# Patient Record
Sex: MALE | Race: WHITE | NOT HISPANIC OR LATINO | Employment: OTHER | URBAN - METROPOLITAN AREA
[De-identification: names, ages, dates, MRNs, and addresses within clinical notes are randomized per-mention and may not be internally consistent; named-entity substitution may affect disease eponyms.]

---

## 2017-07-03 ENCOUNTER — OFFICE VISIT (OUTPATIENT)
Dept: FAMILY MEDICINE | Facility: CLINIC | Age: 57
End: 2017-07-03
Payer: COMMERCIAL

## 2017-07-03 VITALS
HEART RATE: 62 BPM | WEIGHT: 160.69 LBS | SYSTOLIC BLOOD PRESSURE: 102 MMHG | TEMPERATURE: 99 F | HEIGHT: 66 IN | BODY MASS INDEX: 25.83 KG/M2 | DIASTOLIC BLOOD PRESSURE: 68 MMHG

## 2017-07-03 DIAGNOSIS — G47.00 INSOMNIA, UNSPECIFIED TYPE: Primary | ICD-10-CM

## 2017-07-03 DIAGNOSIS — H61.23 BILATERAL IMPACTED CERUMEN: ICD-10-CM

## 2017-07-03 DIAGNOSIS — Z00.00 ROUTINE GENERAL MEDICAL EXAMINATION AT A HEALTH CARE FACILITY: ICD-10-CM

## 2017-07-03 PROCEDURE — 99386 PREV VISIT NEW AGE 40-64: CPT | Mod: S$GLB,,, | Performed by: FAMILY MEDICINE

## 2017-07-03 PROCEDURE — 99999 PR PBB SHADOW E&M-EST. PATIENT-LVL III: CPT | Mod: PBBFAC,,, | Performed by: FAMILY MEDICINE

## 2017-07-03 RX ORDER — TRAZODONE HYDROCHLORIDE 50 MG/1
50 TABLET ORAL NIGHTLY
Qty: 30 TABLET | Refills: 11 | Status: SHIPPED | OUTPATIENT
Start: 2017-07-03 | End: 2018-07-23

## 2017-07-03 NOTE — PROGRESS NOTES
Subjective:       Patient ID: Azam Bowden is a 56 y.o. male.    Chief Complaint: Establish Care    Disclaimer: This note has been generated using voice-recognition software. There may be typographical errors that have been missed during proof-reading    57 yo presents today for routine exam and to establish care with me as new PCP  Last exam 2 years ago  Sees outside GI (Dr. Garcia) for history of UC, has colonoscopy scheduled later this year  Immunization:  Thinks he had tetanus vaccin within past 6 years      Review of Systems   Constitutional: Negative for activity change, appetite change, chills, fatigue, fever and unexpected weight change.   HENT: Positive for hearing loss. Negative for congestion, ear discharge, ear pain and sinus pressure.    Eyes: Negative for pain and visual disturbance.   Respiratory: Negative for cough, chest tightness and shortness of breath.    Cardiovascular: Negative for chest pain, palpitations and leg swelling.   Gastrointestinal: Negative for abdominal distention and abdominal pain.   Genitourinary: Negative for difficulty urinating, dysuria, frequency and hematuria.   Musculoskeletal: Negative for arthralgias, back pain, gait problem, joint swelling, myalgias, neck pain and neck stiffness.   Skin: Negative for rash.   Neurological: Negative for dizziness, tremors, speech difficulty, weakness, numbness and headaches.   Psychiatric/Behavioral: Negative for agitation and behavioral problems.       Objective:      Physical Exam   Constitutional: He is oriented to person, place, and time. He appears well-developed and well-nourished.   HENT:   Head: Normocephalic.   Nose: Nose normal.   Mouth/Throat: Uvula is midline, oropharynx is clear and moist and mucous membranes are normal.   Bilateral cerumen impaction, unable to remove today     Eyes: Conjunctivae and EOM are normal. Pupils are equal, round, and reactive to light.   Neck: Normal range of motion. Neck supple. No JVD  present. Carotid bruit is not present. No thyroid mass and no thyromegaly present.   Cardiovascular: Normal rate, regular rhythm and normal heart sounds.    No murmur heard.  Pulmonary/Chest: Effort normal and breath sounds normal. He has no rales.   Abdominal: Soft. Bowel sounds are normal. He exhibits no mass. There is no tenderness. A hernia is present. Hernia confirmed positive in the right inguinal area. Hernia confirmed negative in the left inguinal area.   Genitourinary: Prostate normal, testes normal and penis normal. Right testis shows no mass and no tenderness. Left testis shows no mass and no tenderness. Circumcised.   Genitourinary Comments: Small asymptomatic [facility]   Musculoskeletal: Normal range of motion. He exhibits no edema.   Lymphadenopathy:     He has no cervical adenopathy. No inguinal adenopathy noted on the right or left side.   Neurological: He is alert and oriented to person, place, and time. He has normal reflexes. No cranial nerve deficit.   Skin: Skin is warm. No lesion and no rash noted.   Psychiatric: He has a normal mood and affect.       Assessment:       1.  Physical exam  2.  Cerumen impaction  3.  Insomnia  4.  RIH   Plan:       1.  Fasting labs, UA  2.  Trial of Trazodone 50mg prn  3.  Consult ENT for cerumen impaction

## 2017-07-05 ENCOUNTER — TELEPHONE (OUTPATIENT)
Dept: OTOLARYNGOLOGY | Facility: CLINIC | Age: 57
End: 2017-07-05

## 2017-07-05 NOTE — TELEPHONE ENCOUNTER
----- Message from Ynes Schreiber sent at 7/5/2017  1:38 PM CDT -----  Contact: [phone]  Pt would like to be seen today or this week terrible ear clogging/can't hear. Message was sent this morning have not heard anything as of yet. Please advise.

## 2017-07-05 NOTE — TELEPHONE ENCOUNTER
Dr Man, next available for new patient is on 7/28/17. Book this patient sooner? This patient was asking for this Friday, fyi you do have 21 patients that day. Please advise

## 2017-07-07 ENCOUNTER — LAB VISIT (OUTPATIENT)
Dept: LAB | Facility: HOSPITAL | Age: 57
End: 2017-07-07
Attending: FAMILY MEDICINE
Payer: COMMERCIAL

## 2017-07-07 DIAGNOSIS — Z00.00 ROUTINE GENERAL MEDICAL EXAMINATION AT A HEALTH CARE FACILITY: ICD-10-CM

## 2017-07-07 LAB
ALBUMIN SERPL BCP-MCNC: 3.7 G/DL
ALP SERPL-CCNC: 45 U/L
ALT SERPL W/O P-5'-P-CCNC: 31 U/L
ANION GAP SERPL CALC-SCNC: 11 MMOL/L
AST SERPL-CCNC: 29 U/L
BASOPHILS # BLD AUTO: 0.02 K/UL
BASOPHILS NFR BLD: 0.3 %
BILIRUB SERPL-MCNC: 0.4 MG/DL
BUN SERPL-MCNC: 9 MG/DL
CALCIUM SERPL-MCNC: 9.2 MG/DL
CHLORIDE SERPL-SCNC: 104 MMOL/L
CHOLEST/HDLC SERPL: 3.9 {RATIO}
CO2 SERPL-SCNC: 24 MMOL/L
COMPLEXED PSA SERPL-MCNC: 3.2 NG/ML
CREAT SERPL-MCNC: 0.9 MG/DL
DIFFERENTIAL METHOD: NORMAL
EOSINOPHIL # BLD AUTO: 0.1 K/UL
EOSINOPHIL NFR BLD: 1.9 %
ERYTHROCYTE [DISTWIDTH] IN BLOOD BY AUTOMATED COUNT: 13.1 %
EST. GFR  (AFRICAN AMERICAN): >60 ML/MIN/1.73 M^2
EST. GFR  (NON AFRICAN AMERICAN): >60 ML/MIN/1.73 M^2
GLUCOSE SERPL-MCNC: 83 MG/DL
HCT VFR BLD AUTO: 45.3 %
HDL/CHOLESTEROL RATIO: 25.6 %
HDLC SERPL-MCNC: 238 MG/DL
HDLC SERPL-MCNC: 61 MG/DL
HGB BLD-MCNC: 15.5 G/DL
LDLC SERPL CALC-MCNC: 151.8 MG/DL
LYMPHOCYTES # BLD AUTO: 2.2 K/UL
LYMPHOCYTES NFR BLD: 34.1 %
MCH RBC QN AUTO: 29.8 PG
MCHC RBC AUTO-ENTMCNC: 34.2 %
MCV RBC AUTO: 87 FL
MONOCYTES # BLD AUTO: 0.6 K/UL
MONOCYTES NFR BLD: 9.9 %
NEUTROPHILS # BLD AUTO: 3.4 K/UL
NEUTROPHILS NFR BLD: 53.5 %
NONHDLC SERPL-MCNC: 177 MG/DL
PLATELET # BLD AUTO: 215 K/UL
PMV BLD AUTO: 9.4 FL
POTASSIUM SERPL-SCNC: 4 MMOL/L
PROT SERPL-MCNC: 6.9 G/DL
RBC # BLD AUTO: 5.21 M/UL
SODIUM SERPL-SCNC: 139 MMOL/L
TRIGL SERPL-MCNC: 126 MG/DL
TSH SERPL DL<=0.005 MIU/L-ACNC: 0.87 UIU/ML
WBC # BLD AUTO: 6.39 K/UL

## 2017-07-07 PROCEDURE — 80061 LIPID PANEL: CPT

## 2017-07-07 PROCEDURE — 36415 COLL VENOUS BLD VENIPUNCTURE: CPT | Mod: PO

## 2017-07-07 PROCEDURE — 80053 COMPREHEN METABOLIC PANEL: CPT

## 2017-07-07 PROCEDURE — 84443 ASSAY THYROID STIM HORMONE: CPT

## 2017-07-07 PROCEDURE — 84153 ASSAY OF PSA TOTAL: CPT

## 2017-07-07 PROCEDURE — 85025 COMPLETE CBC W/AUTO DIFF WBC: CPT

## 2017-07-07 NOTE — TELEPHONE ENCOUNTER
amanda attempted to call patient, no answer, left voice mail to return call to schedule sooner appointment than 7/28/17

## 2017-07-26 ENCOUNTER — CLINICAL SUPPORT (OUTPATIENT)
Dept: OTOLARYNGOLOGY | Facility: CLINIC | Age: 57
End: 2017-07-26
Payer: COMMERCIAL

## 2017-07-26 ENCOUNTER — OFFICE VISIT (OUTPATIENT)
Dept: OTOLARYNGOLOGY | Facility: CLINIC | Age: 57
End: 2017-07-26
Payer: COMMERCIAL

## 2017-07-26 VITALS
WEIGHT: 159.5 LBS | HEART RATE: 55 BPM | SYSTOLIC BLOOD PRESSURE: 138 MMHG | HEIGHT: 66 IN | BODY MASS INDEX: 25.63 KG/M2 | TEMPERATURE: 98 F | DIASTOLIC BLOOD PRESSURE: 81 MMHG

## 2017-07-26 DIAGNOSIS — H93.13 TINNITUS AURIUM, BILATERAL: ICD-10-CM

## 2017-07-26 DIAGNOSIS — H93.13 TINNITUS, BILATERAL: ICD-10-CM

## 2017-07-26 DIAGNOSIS — J34.3 HYPERTROPHY OF INFERIOR NASAL TURBINATE: ICD-10-CM

## 2017-07-26 DIAGNOSIS — H90.3 SENSORINEURAL HEARING LOSS, BILATERAL: Primary | ICD-10-CM

## 2017-07-26 DIAGNOSIS — H90.3 SENSORINEURAL HEARING LOSS (SNHL), BILATERAL: Primary | ICD-10-CM

## 2017-07-26 PROCEDURE — 99999 PR PBB SHADOW E&M-EST. PATIENT-LVL I: CPT | Mod: PBBFAC,,, | Performed by: AUDIOLOGIST-HEARING AID FITTER

## 2017-07-26 PROCEDURE — 92557 COMPREHENSIVE HEARING TEST: CPT | Mod: S$GLB,,, | Performed by: AUDIOLOGIST-HEARING AID FITTER

## 2017-07-26 PROCEDURE — 99999 PR PBB SHADOW E&M-EST. PATIENT-LVL III: CPT | Mod: PBBFAC,,, | Performed by: OTOLARYNGOLOGY

## 2017-07-26 PROCEDURE — 99204 OFFICE O/P NEW MOD 45 MIN: CPT | Mod: S$GLB,,, | Performed by: OTOLARYNGOLOGY

## 2017-07-26 PROCEDURE — 92567 TYMPANOMETRY: CPT | Mod: S$GLB,,, | Performed by: AUDIOLOGIST-HEARING AID FITTER

## 2017-07-26 RX ORDER — ALPRAZOLAM 1 MG/1
TABLET ORAL
COMMUNITY
Start: 2017-04-25 | End: 2018-11-08

## 2017-07-26 NOTE — PROGRESS NOTES
Nicholas Arauz, -AAA  [facility] [address]  [address]  [place] [address]      Patient: Azam Bowden   MRN: [id number]  : 1960  MCBRIDE: 2017       AUDIOLOGICAL EVALUATION    CASE HISTORY:    Azam Bowden, 57 y.o., was seen on the above date for an audiological evaluation. Patient reported difficulty hearing, periodic tinnitus and a history of occupational noise exposure. No further history significant to hearing loss was reported.    TEST RESULTS:    Imittance testing revealed normal middle ear compliance (Type A tympanograms) in both ears. Speech reception thresholds were obtained at 30 dB HL and 20 dB HL, in the right and left ears, respectively, which was consistent with pure tone results. A word recognition score of 68% was obtained in the right ear using a presentation level of 70 dBHL. A left ear word recognition score of 92% correct was obtained using a presentation level of 60 dBHL.     IMPRESSION:   Audiological testing indicated that Azam Bowden has a mild sloping to severe sensorineural hearing loss in the mid to high frequencies in both ears.    RECOMMENDATIONS:   It is recommended that he:  Continue to receive audiological monitoring annually.  Receive binaural hearing aids to improve speech understanding.  Wear hearing protection when around loud noises.    If you should have any questions or concerns regarding the above information, please do not hesitate to contact me at [phone].      _______________________________  Rishabh Arauz, [facility]  Clinical Audiologist    enclosure: audiogram

## 2017-07-26 NOTE — PROGRESS NOTES
Chief Complaint   Patient presents with    Cerumen Impaction     bilateral   .     HPI:  Mr. Bowden is a very pleasant 57 y.o. male referred here to see me today by Dr. Garner for the first time for evaluation of hearing loss.  He reports hearing loss that has been gradually progressing over the last several years.  He has not noted any difference in hearing between the ears, with both ears being the worse hearing ear.  He has noted any tinnitus in both ears.  He has not had any recent issues with ear pain or ear drainage.  He denies a family history of hearing loss, and has not had any previous otologic surgery.  He admits to any history of significant loud noise exposure.He denies issues with dizziness.        Past Medical History:   Diagnosis Date    GERD (gastroesophageal reflux disease)     Ulcerative colitis     Ulcerative colitis      Social History     Social History    Marital status:      Spouse name: N/A    Number of children: 5    Years of education: N/A     Occupational History    Owner [employer]      Social History Main Topics    Smoking status: Former Smoker     Packs/day: 2.00     Years: 35.00     Types: Cigarettes     Quit date: 2/10/2009    Smokeless tobacco: Former User    Alcohol use 3.0 oz/week     5 Cans of beer per week    Drug use:      Frequency: 10.0 times per week    Sexual activity: Yes     Partners: Female     Other Topics Concern    Not on file     Social History Narrative    No narrative on file     Past Surgical History:   Procedure Laterality Date    anal fistula      CATARACT EXTRACTION, BILATERAL      TONSILLECTOMY       Family History   Problem Relation Age of Onset    Diabetes Father     Cancer Father 75     Colon    Cancer Sister     Cancer Brother      prostate cancer         Review of Systems  General: negative for chills, fever or weight loss  Psychological: negative for mood changes or depression  Ophthalmic: negative for blurry  vision, photophobia or eye pain  ENT: see HPI  Respiratory: no cough, shortness of breath, or wheezing  Cardiovascular: no chest pain or dyspnea on exertion  Gastrointestinal: no abdominal pain, change in bowel habits, or black/ bloody stools  Musculoskeletal: negative for gait disturbance or muscular weakness  Neurological: no syncope or seizures; no ataxia  Dermatological: negative for puritis,  rash and jaundice  Hematologic/lymphatic: no easy bruising, no new lumps or bumps      Physical Exam:    Vitals:    07/26/17 0826   BP: 138/81   Pulse: (!) 55   Temp:        Constitutional: Well appearing / communicating without difficutly.  NAD.  Eyes: EOM I Bilaterally  Head/Face: Normocephalic.  Negative paranasal sinus pressure/tenderness.  Salivary glands WNL.  House Brackmann I Bilaterally.    Right Ear: Auricle normal appearance. External Auditory Canal within normal limits no lesions or masses,TM w/o masses/lesions/perforations. TM mobility noted.   Left Ear: Auricle normal appearance. External Auditory Canal within normal limits no lesions or masses,TM w/o masses/lesions/perforations. TM mobility noted.  Rinne Air conduction >bone conduction bilaterally, Rodriguez midline.   Nose: No gross nasal septal deviation. Inferior Turbinates 3+ bilaterally. No septal perforation. No masses/lesions. External nasal skin appears normal without masses/lesions.  Oral Cavity: Gingiva/lips within normal limits.  Dentition/gingiva healthy appearing. Mucus membranes moist. Floor of mouth soft, no masses palpated. Oral Tongue mobile. Hard Palate appears normal.    Oropharynx: Base of tongue appears normal. No masses/lesions noted. Tonsillar fossa/pharyngeal wall without lesions. Posterior oropharynx WNL.  Soft palate without masses. Midline uvula.   Neck/Lymphatic: No LAD I-VI bilaterally.  No thyromegaly.  No masses noted on exam.    Mirror laryngoscopy/nasopharyngoscopy: Active gag reflex.  Unable to perform.    Neuro/Psychiatric:  AOx3.  Normal mood and affect.   Cardiovascular: Normal carotid pulses bilaterally, no increasing jugular venous distention noted at cervical region bilaterally.    Respiratory: Normal respiratory effort, no stridor, no retractions noted.          Audiogram reviewed personally by myself and in detail with the patient today.     Assessment:    ICD-10-CM ICD-9-CM    1. Sensorineural hearing loss (SNHL), bilateral H90.3 389.18    2. Tinnitus aurium, bilateral H93.13 388.31    3. Hypertrophy of inferior nasal turbinate J34.3 478.0      The primary encounter diagnosis was Sensorineural hearing loss (SNHL), bilateral. Diagnoses of Tinnitus aurium, bilateral and Hypertrophy of inferior nasal turbinate were also pertinent to this visit.      Plan:      1.  I discussed the documented hearing loss in this setting, as well as candidacy for amplification.   2.  The patient is interested in discussing with audiologist and appointment as well as contact information was given.  3.   Annual audiograms recommended, as well as ear protection when exposed to loud sounds.  4.  Inferior turbinate hypertrophy: recommend daily use of  Flonase.     Thank you kindly for allowing me to participate in the patient's care.       Genet Medina MD

## 2017-07-26 NOTE — LETTER
July 26, 2017      Velasquez Garner MD  [address] [facility]  [address]  [facility] [address]           [facility]hinol[facility]ology  [address], [address]  [facility] [address]  Phone: [phone]  Fax: [phone]          Patient: Azam Bowden   MR Number: [id number]   YOB: 1960   Date of Visit: 7/26/2017       Dear Dr. Velasquez Garner:    Thank you for referring Azam Bowden to me for evaluation. Attached you will find relevant portions of my assessment and plan of care.    If you have questions, please do not hesitate to call me. I look forward to following Azam Bowden along with you.    Sincerely,    Genet Medina MD    Enclosure  CC:  No Recipients    If you would like to receive this communication electronically, please contact [email or web address] or [phone] to request more information on [employer] Link access.    For providers and/or their staff who would like to refer a patient to [facility], please contact us through our one-stop-shop provider referral line, [facility], at [phone].    If you feel you have received this communication in error or would no longer like to receive these types of communications, please e-mail [email or web address]

## 2017-08-11 ENCOUNTER — TELEPHONE (OUTPATIENT)
Dept: FAMILY MEDICINE | Facility: CLINIC | Age: 57
End: 2017-08-11

## 2017-08-11 NOTE — TELEPHONE ENCOUNTER
----- Message from Alexandria Doyle sent at 8/11/2017  1:11 PM CDT -----  Contact: Self/ [phone]   Pt want to speak with someone in the office about his face hurt is half of it is swollen. Please call and advise     Thank you

## 2017-08-11 NOTE — TELEPHONE ENCOUNTER
Patient reports that his face and jaw starting swelling today. Denies any dental or ear pain. Verbalizes that he wasn't bitten by anything. Denies rash or difficulty breathing. Appointment scheduled.

## 2018-01-16 ENCOUNTER — TELEPHONE (OUTPATIENT)
Dept: FAMILY MEDICINE | Facility: CLINIC | Age: 58
End: 2018-01-16

## 2018-01-16 RX ORDER — OSELTAMIVIR PHOSPHATE 75 MG/1
75 CAPSULE ORAL 2 TIMES DAILY
Qty: 10 CAPSULE | Refills: 0 | Status: SHIPPED | OUTPATIENT
Start: 2018-01-16 | End: 2018-01-21

## 2018-01-16 NOTE — TELEPHONE ENCOUNTER
----- Message from Macy Garcia sent at 1/16/2018  1:40 PM CST -----  Contact: Spouse/Joaquina [phone]  [employer]    Patient is experiencing body aches, chills and cough.    [facility] [address] [place] [address] ANIBAL SEWELL AT [facility]    Please advise if a Rx is called in.    Please call and advise.    Thank You

## 2018-01-16 NOTE — TELEPHONE ENCOUNTER
Patient's wife informed that Rx for Tamiflu has been sent to pharmacy on file. Verbalizes understanding.

## 2018-01-17 ENCOUNTER — NURSE TRIAGE (OUTPATIENT)
Dept: ADMINISTRATIVE | Facility: CLINIC | Age: 58
End: 2018-01-17

## 2018-01-18 NOTE — TELEPHONE ENCOUNTER
Reason for Disposition   [1] MODERATE difficulty breathing (e.g., speaks in phrases, SOB even at rest, pulse 100-120) AND [2] NEW-onset or WORSE than normal    Protocols used: ST BREATHING DIFFICULTY-A-AH    Wife called for patient who is home but is currently vomiting so he cannot talk on the phone. Daughter got on the phone also who is a RN and reported that she heard crackles in his lower lobes. Pt was currently being treated for the flu- was not tested, just had tamiflu called in. Pt is having some shortness of breath as well as pain in the chest with coughing. Advised to go to ER

## 2018-01-19 ENCOUNTER — NURSE TRIAGE (OUTPATIENT)
Dept: ADMINISTRATIVE | Facility: CLINIC | Age: 58
End: 2018-01-19

## 2018-01-19 NOTE — TELEPHONE ENCOUNTER
Patient's wife speaking for patient. Patient can be heard in background. Reports chest pain x 3 days. Verbalizes that patient thinks pain is R/T cough. Reports pain increases with movement. Rates pain 7/10. Reports SOB on exertion. Dr. Burton notified. Instructed to have patient go to ED. Patient's wife informed as such. Unsure whether advice will be followed.

## 2018-01-19 NOTE — TELEPHONE ENCOUNTER
"    Reason for Disposition   [1] Chest pain lasts > 5 minutes AND [2] age > 30 AND [3] at least one cardiac risk factor (i.e., hypertension, diabetes, obesity, smoker or strong family history of heart disease)    Answer Assessment - Initial Assessment Questions  1. LOCATION: "Where does it hurt?"       CP, SOB, nausea passed, HA, coughing x several day   2. RADIATION: "Does the pain go anywhere else?" (e.g., into neck, jaw, arms, back)     No   3. ONSET: "When did the chest pain begin?" (Minutes, hours or days)      Above nipple line since 1/15-16   4. PATTERN "Does the pain come and go, or has it been constant since it started?"  "Does it get worse with exertion?"     Constant - worsening with mvmt   5. DURATION: "How long does it last" (e.g., seconds, minutes, hours)    Constant - intense and lets up   6. SEVERITY: "How bad is the pain?"  (e.g., Scale 1-10; mild, moderate, or severe)     - MILD (1-3): doesn't interfere with normal activities      - MODERATE (4-7): interferes with normal activities or awakens from sleep     - SEVERE (8-10): excruciating pain, unable to do any normal activities      7  7. CARDIAC RISK FACTORS: "Do you have any history of heart problems or risk factors for heart disease?" (e.g., prior heart attack, angina; high blood pressure, diabetes, being overweight, high cholesterol, smoking, or strong family history of heart disease)   hx tob 10 years, pleurisy   8. PULMONARY RISK FACTORS: "Do you have any history of lung disease?"  (e.g., blood clots in lung, asthma, emphysema, birth control pills)  Asthma   9. CAUSE: "What do you think is causing the chest pain?"    Fluid in lungs chest cold. Productive cough   10. OTHER SYMPTOMS: "Do you have any other symptoms?" (e.g., dizziness, nausea, vomiting, sweating, fever, difficulty breathing, cough)     Fever one to two days ago.    Protocols used: ST CHEST PAIN-A-AH  rec EMS due to CP x 3 days rated 7, cough, SOB, hx tob. Pt requests to have " message sent to PCP. Office message sent. Call back with questions.

## 2018-01-20 ENCOUNTER — HOSPITAL ENCOUNTER (EMERGENCY)
Facility: HOSPITAL | Age: 58
Discharge: HOME | End: 2018-01-20
Attending: EMERGENCY MEDICINE
Payer: COMMERCIAL

## 2018-01-20 VITALS
RESPIRATION RATE: 18 BRPM | TEMPERATURE: 98 F | WEIGHT: 153 LBS | HEART RATE: 70 BPM | OXYGEN SATURATION: 99 % | HEIGHT: 66 IN | SYSTOLIC BLOOD PRESSURE: 154 MMHG | DIASTOLIC BLOOD PRESSURE: 87 MMHG | BODY MASS INDEX: 24.59 KG/M2

## 2018-01-20 DIAGNOSIS — J06.9 VIRAL UPPER RESPIRATORY TRACT INFECTION: Primary | ICD-10-CM

## 2018-01-20 DIAGNOSIS — R05.9 COUGH: ICD-10-CM

## 2018-01-20 DIAGNOSIS — R07.9 CHEST PAIN: ICD-10-CM

## 2018-01-20 LAB
FLUAV AG SPEC QL IA: NEGATIVE
FLUBV AG SPEC QL IA: NEGATIVE
SPECIMEN SOURCE: NORMAL

## 2018-01-20 PROCEDURE — 99285 EMERGENCY DEPT VISIT HI MDM: CPT | Mod: ,,, | Performed by: EMERGENCY MEDICINE

## 2018-01-20 PROCEDURE — 87400 INFLUENZA A/B EACH AG IA: CPT | Mod: 59

## 2018-01-20 PROCEDURE — 93005 ELECTROCARDIOGRAM TRACING: CPT

## 2018-01-20 PROCEDURE — 93010 ELECTROCARDIOGRAM REPORT: CPT | Mod: ,,, | Performed by: INTERNAL MEDICINE

## 2018-01-20 PROCEDURE — 99284 EMERGENCY DEPT VISIT MOD MDM: CPT

## 2018-01-20 RX ORDER — GUAIFENESIN 600 MG/1
1200 TABLET, EXTENDED RELEASE ORAL 2 TIMES DAILY
Qty: 40 TABLET | Refills: 0 | COMMUNITY
Start: 2018-01-20 | End: 2018-01-30

## 2018-01-20 RX ORDER — ALBUTEROL SULFATE 90 UG/1
2 AEROSOL, METERED RESPIRATORY (INHALATION) EVERY 6 HOURS PRN
Qty: 18 G | Refills: 0 | Status: SHIPPED | OUTPATIENT
Start: 2018-01-20 | End: 2018-07-23

## 2018-01-20 RX ORDER — IPRATROPIUM BROMIDE AND ALBUTEROL SULFATE 2.5; .5 MG/3ML; MG/3ML
3 SOLUTION RESPIRATORY (INHALATION)
Status: DISCONTINUED | OUTPATIENT
Start: 2018-01-20 | End: 2018-01-20

## 2018-01-20 NOTE — ED NOTES
Patient identifiers verified and correct for Mr Bowden  C/C: Flu like symptoms  APPEARANCE: awake and alert in NAD.  SKIN: warm, dry and intact. No breakdown or bruising.  MUSCULOSKELETAL: Patient moving all extremities spontaneously, no obvious swelling or deformities noted. Ambulates independently.  RESPIRATORY: Denies shortness of breath.Respirations unlabored. Positive harsh cough  CARDIAC: Denies CP, 2+ distal pulses; no peripheral edema  ABDOMEN: S/ND/NT, Positive nausea several days ago  : voids spontaneously, denies difficulty  Neurologic: AAO x 4; follows commands equal strength in all extremities; denies numbness/tingling. Denies dizziness Positive weakness, gen body aches, posiitce headache

## 2018-01-20 NOTE — ED PROVIDER NOTES
Encounter Date: 1/20/2018       History     Chief Complaint   Patient presents with    Generalized Body Aches     coughing up green, going on since monday,      HPI   56 y/o man w/ h/o ulcerative colitis not on treatment p/w URI symptoms and fever x 5 days. Patient reports progressive cough productive of green sputum, congestion, fever to 101. Reports mild dyspnea on exertion which is abnormal for him as he runs regularly also reports CP when coughing (not with exertion or any time other than when coughing). Called PCP on 1/16, Rx'd tamiflu but reports no relief. Long history of smoking, ~20-30PYs, quit ten years ago, no known history of COPD. No cardiac history and no FHx early cardiac disease.    Review of patient's allergies indicates:  No Known Allergies  Past Medical History:   Diagnosis Date    GERD (gastroesophageal reflux disease)     Ulcerative colitis     Ulcerative colitis      Past Surgical History:   Procedure Laterality Date    anal fistula      CATARACT EXTRACTION, BILATERAL      TONSILLECTOMY       Family History   Problem Relation Age of Onset    Diabetes Father     Cancer Father 75     Colon    Cancer Sister     Cancer Brother      prostate cancer     Social History   Substance Use Topics    Smoking status: Former Smoker     Packs/day: 2.00     Years: 35.00     Types: Cigarettes     Quit date: 2/10/2009    Smokeless tobacco: Former User    Alcohol use 3.0 oz/week     5 Cans of beer per week      Comment: occas, none x 1 week     Review of Systems   Constitutional: Positive for fatigue. Negative for chills and fever.   HENT: Negative for congestion, rhinorrhea and sore throat.    Respiratory: Positive for cough and shortness of breath. Negative for wheezing.    Cardiovascular: Positive for chest pain. Negative for leg swelling.   Gastrointestinal: Negative for abdominal pain, diarrhea, nausea and vomiting.   Genitourinary: Negative for dysuria and frequency.   Neurological: Negative  for dizziness and light-headedness.       Physical Exam     Initial Vitals [01/20/18 1357]   BP Pulse Resp Temp SpO2   (!) 166/88 66 18 98.7 °F (37.1 °C) 97 %      MAP       114         Physical Exam    Nursing note and vitals reviewed.  Constitutional: He appears well-developed and well-nourished. He is not diaphoretic. No distress.   HENT:   Head: Normocephalic and atraumatic.   Mouth/Throat: Oropharynx is clear and moist.   Eyes: EOM are normal. Pupils are equal, round, and reactive to light.   Neck: Normal range of motion. Neck supple.   Cardiovascular: Normal rate and regular rhythm.   No murmur heard.  Pulmonary/Chest: No stridor. No respiratory distress. He has wheezes (diffuse). He has rhonchi (diffuse). He has no rales.   Abdominal: Soft. Bowel sounds are normal. He exhibits no distension. There is no tenderness. There is no rebound.   Musculoskeletal: He exhibits no edema.   Neurological: He is alert and oriented to person, place, and time.   Skin: Skin is warm and dry.   Psychiatric: He has a normal mood and affect.         ED Course   Procedures  Labs Reviewed   INFLUENZA A AND B ANTIGEN     EKG Readings: (Independently Interpreted)   Initial Reading: No STEMI. Rhythm: Sinus Bradycardia. Heart Rate: 56. Conduction: Normal. ST Segments: Normal ST Segments. T Waves: Normal. Axis: Normal.                APC / Resident Notes:   DDx incl influenza, viral URI, PNA, ACS, COPD, among others. Low suspicion for ACS given highly atypical CP much more likely due to cough and as patient is otherwise healthy, minimal risk factors, runs daily with no angina, and symptoms. EKG w/ no ischemic changes. CXR relatively hyperinflated, no focal consolidation. May have underlying COPD vs RAD 2' viral infection, ordered duoneb however patient requesting to be discharged prior to administration. Will give Rx for albuterol pump, mucinex, and advised him to fill and take Tamiflu that was prescribed as syndrome highly suspicious  for influenza despite negative flu. Discussed findings, suspected diagnosis, treatment plan, and reasons to return to ED. Patient and family express understanding and are comfortable with this plan. Advised f/u with PCP early next week.  Agustín Gallegos M.D.  PGY4 [employer] EM/IM  1/20/2018 5:28 PM                ED Course as of Jan 20 1729   Sat Jan 20, 2018   1642 EKG 12-lead [SS]      ED Course User Index  [SS] Agustín Gallegos MD     Clinical Impression:   The primary encounter diagnosis was Viral upper respiratory tract infection. Diagnoses of Chest pain and Cough were also pertinent to this visit.                           Agustín Gallegos MD  Resident  01/20/18 9767

## 2018-01-20 NOTE — ED TRIAGE NOTES
"Patient states cough, headache, CP with cough, gen body aches, chills. States Nyquil works for "a while" Temp to 101 since Monday. Got rx for Tamiflu 1/16 but did not take it,   "

## 2018-01-30 ENCOUNTER — OFFICE VISIT (OUTPATIENT)
Dept: FAMILY MEDICINE | Facility: CLINIC | Age: 58
End: 2018-01-30
Payer: COMMERCIAL

## 2018-01-30 VITALS
HEART RATE: 56 BPM | TEMPERATURE: 98 F | WEIGHT: 160.06 LBS | HEIGHT: 68 IN | DIASTOLIC BLOOD PRESSURE: 62 MMHG | SYSTOLIC BLOOD PRESSURE: 118 MMHG | BODY MASS INDEX: 24.26 KG/M2

## 2018-01-30 DIAGNOSIS — Z91.89 AT RISK FOR CORONARY ARTERY DISEASE: ICD-10-CM

## 2018-01-30 DIAGNOSIS — R06.09 DYSPNEA ON EXERTION: Primary | ICD-10-CM

## 2018-01-30 PROCEDURE — 3008F BODY MASS INDEX DOCD: CPT | Mod: S$GLB,,, | Performed by: FAMILY MEDICINE

## 2018-01-30 PROCEDURE — 99214 OFFICE O/P EST MOD 30 MIN: CPT | Mod: S$GLB,,, | Performed by: FAMILY MEDICINE

## 2018-01-30 PROCEDURE — 99999 PR PBB SHADOW E&M-EST. PATIENT-LVL III: CPT | Mod: PBBFAC,,, | Performed by: FAMILY MEDICINE

## 2018-01-30 NOTE — PROGRESS NOTES
Subjective:       Patient ID: Azam Bowden is a 57 y.o. male.    Chief Complaint: Follow-up (cough)    Disclaimer: This note has been generated using voice-recognition software. There may be typographical errors that have been missed during proof-reading    56 yo presents today for follow up after initial ED evaluation for cough and dyspnea on exertion.  In ED, pt had normal EKG and CXR showed no lung abnormalities but aortic atherosclerosis.  He has noted gradual improvement of cough, but has noted recurrent dyspnea and occasional substernal chest pains without radiation into neck or arms      Review of Systems   Constitutional: Negative for activity change, fatigue and unexpected weight change.   Respiratory: Positive for chest tightness. Negative for shortness of breath and wheezing.    Cardiovascular: Positive for chest pain. Negative for palpitations and leg swelling.   Gastrointestinal: Negative for abdominal distention and abdominal pain.       Objective:      Physical Exam   Constitutional: He appears well-developed and well-nourished. No distress.   HENT:   Right Ear: Tympanic membrane and ear canal normal.   Left Ear: Tympanic membrane and ear canal normal.   Nose: No mucosal edema or rhinorrhea.   Mouth/Throat: No posterior oropharyngeal erythema.   Neck: Normal range of motion. No JVD present. No thyromegaly present.   Cardiovascular: Normal rate and regular rhythm.    No murmur heard.  Pulmonary/Chest: Effort normal and breath sounds normal. He has no wheezes.   Lymphadenopathy:     He has no cervical adenopathy.       Assessment:       1. Dyspnea on exertion    2. At risk for coronary artery disease        Plan:       1.  Recent labs, EKG, CXR reviewed with pt/wife today  2.  Schedule stress echo/coronary calcium score

## 2018-02-01 ENCOUNTER — HOSPITAL ENCOUNTER (OUTPATIENT)
Dept: RADIOLOGY | Facility: HOSPITAL | Age: 58
Discharge: HOME | End: 2018-02-01
Attending: FAMILY MEDICINE

## 2018-02-01 ENCOUNTER — HOSPITAL ENCOUNTER (OUTPATIENT)
Dept: CARDIOLOGY | Facility: CLINIC | Age: 58
Discharge: HOME | End: 2018-02-01
Attending: FAMILY MEDICINE
Payer: COMMERCIAL

## 2018-02-01 DIAGNOSIS — R06.09 DYSPNEA ON EXERTION: ICD-10-CM

## 2018-02-01 DIAGNOSIS — Z91.89 AT RISK FOR CORONARY ARTERY DISEASE: ICD-10-CM

## 2018-02-01 LAB
DIASTOLIC DYSFUNCTION: NO
RETIRED EF AND QEF - SEE NOTES: 65 (ref 55–65)

## 2018-02-01 PROCEDURE — 75571 CT HRT W/O DYE W/CA TEST: CPT | Mod: 26,,, | Performed by: RADIOLOGY

## 2018-02-01 PROCEDURE — 75571 CT HRT W/O DYE W/CA TEST: CPT | Mod: TC

## 2018-02-01 PROCEDURE — 93351 STRESS TTE COMPLETE: CPT | Mod: S$GLB,,, | Performed by: INTERNAL MEDICINE

## 2018-02-01 PROCEDURE — 93321 DOPPLER ECHO F-UP/LMTD STD: CPT | Mod: S$GLB,,, | Performed by: INTERNAL MEDICINE

## 2018-04-12 ENCOUNTER — TELEPHONE (OUTPATIENT)
Dept: FAMILY MEDICINE | Facility: CLINIC | Age: 58
End: 2018-04-12

## 2018-04-12 DIAGNOSIS — Z00.00 ANNUAL PHYSICAL EXAM: Primary | ICD-10-CM

## 2018-04-12 NOTE — TELEPHONE ENCOUNTER
----- Message from Misty Cat sent at 4/12/2018 11:22 AM CDT -----  Contact: self/[phone]      Doctor appointment and lab have been scheduled.  Please link lab orders to the lab appointment.  Date of doctor appointment:  07/23/18  Physical or EP:  Physical  Date of lab appointment:  07/20/18  Comments: please attach order

## 2018-06-25 ENCOUNTER — TELEPHONE (OUTPATIENT)
Dept: FAMILY MEDICINE | Facility: CLINIC | Age: 58
End: 2018-06-25

## 2018-06-25 NOTE — TELEPHONE ENCOUNTER
----- Message from Earnest Chapa sent at 6/25/2018  9:41 AM CDT -----  Contact: Joaquina/Wife/[phone]  Pt's wife is calling to speak with someone in regards to a psychiatry recommendations for the pt. She states that pt also needs to speak with doctor about some other things as well. Please advise.      Thanks

## 2018-06-26 NOTE — TELEPHONE ENCOUNTER
Attempted to contact patient's wife without success. Phone answered by someone who had difficulty understanding. Will try again.

## 2018-06-26 NOTE — TELEPHONE ENCOUNTER
Wife reports patient is requesting a referral to psych and would like an antidepressant ordered. Denies suicidal ideation. Informed that patient would need an office visit and that none are available with Dr. Garner at this time.  Patient would like to speak to Dr. Garner for advice.

## 2018-06-27 ENCOUNTER — HOSPITAL ENCOUNTER (EMERGENCY)
Facility: HOSPITAL | Age: 58
Discharge: HOME | End: 2018-06-27
Attending: EMERGENCY MEDICINE
Payer: COMMERCIAL

## 2018-06-27 VITALS
HEIGHT: 66 IN | SYSTOLIC BLOOD PRESSURE: 181 MMHG | HEART RATE: 65 BPM | RESPIRATION RATE: 16 BRPM | WEIGHT: 157 LBS | BODY MASS INDEX: 25.23 KG/M2 | OXYGEN SATURATION: 99 % | TEMPERATURE: 99 F | DIASTOLIC BLOOD PRESSURE: 88 MMHG

## 2018-06-27 DIAGNOSIS — R45.89 DYSPHORIC MOOD: Primary | ICD-10-CM

## 2018-06-27 LAB
ALBUMIN SERPL BCP-MCNC: 4.3 G/DL
ALP SERPL-CCNC: 49 U/L
ALT SERPL W/O P-5'-P-CCNC: 24 U/L
AMPHET+METHAMPHET UR QL: NEGATIVE
ANION GAP SERPL CALC-SCNC: 10 MMOL/L
APAP SERPL-MCNC: <3 UG/ML
AST SERPL-CCNC: 24 U/L
BARBITURATES UR QL SCN>200 NG/ML: NEGATIVE
BASOPHILS # BLD AUTO: 0.04 K/UL
BASOPHILS NFR BLD: 0.5 %
BENZODIAZ UR QL SCN>200 NG/ML: NEGATIVE
BILIRUB SERPL-MCNC: 0.9 MG/DL
BILIRUB UR QL STRIP: NEGATIVE
BUN SERPL-MCNC: 10 MG/DL
BZE UR QL SCN: NEGATIVE
CALCIUM SERPL-MCNC: 10.1 MG/DL
CANNABINOIDS UR QL SCN: NORMAL
CHLORIDE SERPL-SCNC: 106 MMOL/L
CLARITY UR REFRACT.AUTO: CLEAR
CO2 SERPL-SCNC: 24 MMOL/L
COLOR UR AUTO: YELLOW
CREAT SERPL-MCNC: 1 MG/DL
CREAT UR-MCNC: 54 MG/DL
DIFFERENTIAL METHOD: ABNORMAL
EOSINOPHIL # BLD AUTO: 0.1 K/UL
EOSINOPHIL NFR BLD: 0.6 %
ERYTHROCYTE [DISTWIDTH] IN BLOOD BY AUTOMATED COUNT: 12.8 %
EST. GFR  (AFRICAN AMERICAN): >60 ML/MIN/1.73 M^2
EST. GFR  (NON AFRICAN AMERICAN): >60 ML/MIN/1.73 M^2
ETHANOL SERPL-MCNC: <10 MG/DL
GLUCOSE SERPL-MCNC: 106 MG/DL
GLUCOSE UR QL STRIP: NEGATIVE
HCT VFR BLD AUTO: 49.3 %
HGB BLD-MCNC: 16.5 G/DL
HGB UR QL STRIP: NEGATIVE
IMM GRANULOCYTES # BLD AUTO: 0.02 K/UL
IMM GRANULOCYTES NFR BLD AUTO: 0.2 %
KETONES UR QL STRIP: NEGATIVE
LEUKOCYTE ESTERASE UR QL STRIP: NEGATIVE
LYMPHOCYTES # BLD AUTO: 2.2 K/UL
LYMPHOCYTES NFR BLD: 26.9 %
MCH RBC QN AUTO: 29.4 PG
MCHC RBC AUTO-ENTMCNC: 33.5 G/DL
MCV RBC AUTO: 88 FL
METHADONE UR QL SCN>300 NG/ML: NEGATIVE
MONOCYTES # BLD AUTO: 0.6 K/UL
MONOCYTES NFR BLD: 7.8 %
NEUTROPHILS # BLD AUTO: 5.2 K/UL
NEUTROPHILS NFR BLD: 64 %
NITRITE UR QL STRIP: NEGATIVE
NRBC BLD-RTO: 0 /100 WBC
OPIATES UR QL SCN: NEGATIVE
PCP UR QL SCN>25 NG/ML: NEGATIVE
PH UR STRIP: 5 [PH] (ref 5–8)
PLATELET # BLD AUTO: 290 K/UL
PMV BLD AUTO: 8.9 FL
POTASSIUM SERPL-SCNC: 3.9 MMOL/L
PROT SERPL-MCNC: 8 G/DL
PROT UR QL STRIP: NEGATIVE
RBC # BLD AUTO: 5.62 M/UL
SALICYLATES SERPL-MCNC: <5 MG/DL
SODIUM SERPL-SCNC: 140 MMOL/L
SP GR UR STRIP: 1 (ref 1–1.03)
TOXICOLOGY INFORMATION: NORMAL
TSH SERPL DL<=0.005 MIU/L-ACNC: 0.81 UIU/ML
URN SPEC COLLECT METH UR: NORMAL
UROBILINOGEN UR STRIP-ACNC: NEGATIVE EU/DL
WBC # BLD AUTO: 8.06 K/UL

## 2018-06-27 PROCEDURE — 80329 ANALGESICS NON-OPIOID 1 OR 2: CPT

## 2018-06-27 PROCEDURE — 85025 COMPLETE CBC W/AUTO DIFF WBC: CPT

## 2018-06-27 PROCEDURE — 84443 ASSAY THYROID STIM HORMONE: CPT

## 2018-06-27 PROCEDURE — 99284 EMERGENCY DEPT VISIT MOD MDM: CPT | Mod: ,,, | Performed by: PHYSICIAN ASSISTANT

## 2018-06-27 PROCEDURE — 80307 DRUG TEST PRSMV CHEM ANLYZR: CPT

## 2018-06-27 PROCEDURE — 80320 DRUG SCREEN QUANTALCOHOLS: CPT

## 2018-06-27 PROCEDURE — 99283 EMERGENCY DEPT VISIT LOW MDM: CPT

## 2018-06-27 PROCEDURE — 80053 COMPREHEN METABOLIC PANEL: CPT

## 2018-06-27 PROCEDURE — 81003 URINALYSIS AUTO W/O SCOPE: CPT | Mod: 59

## 2018-06-27 RX ORDER — LORAZEPAM 0.5 MG/1
0.5 TABLET ORAL
Status: DISCONTINUED | OUTPATIENT
Start: 2018-06-27 | End: 2018-06-27

## 2018-06-27 RX ORDER — ESCITALOPRAM OXALATE 10 MG/1
10 TABLET ORAL DAILY
Qty: 30 TABLET | Refills: 0 | Status: SHIPPED | OUTPATIENT
Start: 2018-06-27 | End: 2018-07-23

## 2018-06-27 NOTE — ED NOTES
Pt identifiers Azam Bowden were checked and are correct  LOC: The patient is awake, alert, aware of environment with an appropriate affect. Oriented x3, speaking appropriately  APPEARANCE: Pt resting comfortably, in no acute distress, pt is clean and well groomed, clothing properly fastened  SKIN: Skin warm, dry and intact, normal skin turgor, moist mucus membranes  RESPIRATORY: Airway is open and patent, respirations are spontaneous, even and unlabored, normal effort and rate  Breath sounds clear miquel to all lung fields on auscultation  CARDIAC: Normal rate and rhythm, no peripheral edema noted, capillary refill < 3 seconds, bilateral radial pulses 2+  ABDOMEN: Soft, nontender, nondistended. Bowel sounds present to all four quad of abd on auscultation  NEUROLOGIC:  facial expression is symmetrical, patient moving all extremities spontaneously, normal sensation in all extremities when touched with a finger.  Follows all commands appropriately  MUSCULOSKELETAL: No obvious deformities.

## 2018-06-27 NOTE — ED TRIAGE NOTES
Pt states she want to get started on an antidepressant . States xanax is not helping him  Pt states he is going thru a lot right now and has a lot of stress

## 2018-06-27 NOTE — ED PROVIDER NOTES
"Encounter Date: 6/27/2018    SCRIBE #1 NOTE: I, Luna Canela, am scribing for, and in the presence of,  Dr. Oliva. I have scribed the following portions of the note - the APC attestation.       History     Chief Complaint   Patient presents with    Psychiatric Evaluation     Pt states he is trying to get some anti depressants but denies suicidal ideations.  Pt's wife states he is suicidal.      Patient is a 57-year-old male with a past medical history of GERD and ulcerative colitis who presents the ED with dysphoric mood.  Patient states for the past month, he has been feeling depressed.  He states that he is under a lot of stress right now with his grandchild who has a rare spine cancer and his 13-year-old son who is currently seeing a psychiatrist for his disruptive behavior. Patient became tearful when discuss his son's recent behavior. Patient reports sleeping okay, but waking up intermittently throughout the night.  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  He does state that he started cutting back on drinking because he identified that it was becoming a problem; he had 3 beers yesterday when he normally has 6.  He denies tobacco use or illicit drug use.  Patient denies any suicidal and homicidal ideations and states that he never had those thoughts before.  Patient states that he has been trying to contact his PCP to get evaluated for his change in mood, but has been unsuccessful.  He states that he cannot wait until October to see a psychiatrist.  Patient's son suggested to patient that he should take an antidepressant for his mood changes, and patient is interested in starting one.  He states he was prescribed Xanax by his gastroenterologist years ago, but rarely takes it.  He states he has an entire bottle, but does not like how it sedates him.  He states that he would prefer to see a health care provider instead of "self medicating". " "He denies any chest pain, abdominal pain, urinary symptoms, or past hx of psych disorders of suicidal ideations or attempts.       11:33 AM  I called patient's wife, ([phone]), who is currently with their 13 year old son on campus at his psychiatry appointment today. She states that patient had been having "bad feelings", but "he would never do it" as in hurt himself. She states that she "wish I hadn't said" that patient was suicidal when checking him in because it was not true. Wife states that patient has been feeling more stressed and "anxiety ridden" and started drinking more alcohol recently, but patient states that he realized what he was doing and started cutting back. Wife states that patient has been more tearful around her. They have been trying to contact their PCP for his mood change but has not heard back yet for the past 3 days. They called for a psych office visit, but they were told that the next appointment would be in October.          Review of patient's allergies indicates:  No Known Allergies  Past Medical History:   Diagnosis Date    GERD (gastroesophageal reflux disease)     Ulcerative colitis     Ulcerative colitis      Past Surgical History:   Procedure Laterality Date    anal fistula      CATARACT EXTRACTION, BILATERAL      TONSILLECTOMY       Family History   Problem Relation Age of Onset    Diabetes Father     Cancer Father 75        Colon    Cancer Sister     Cancer Brother         prostate cancer     Social History   Substance Use Topics    Smoking status: Former Smoker     Packs/day: 2.00     Years: 35.00     Types: Cigarettes     Quit date: 2/10/2009    Smokeless tobacco: Former User    Alcohol use 3.0 oz/week     5 Cans of beer per week      Comment: occas, none x 1 week     Review of Systems   Constitutional: Negative for chills and fever.   HENT: Negative for ear pain, sinus pain and sore throat.    Eyes: Negative for visual disturbance.   Respiratory: Negative " for cough and shortness of breath.    Cardiovascular: Negative for chest pain.   Gastrointestinal: Negative for nausea.   Endocrine: Negative for polyuria.   Genitourinary: Negative for dysuria and penile pain.   Musculoskeletal: Negative for back pain.   Skin: Negative for rash.   Neurological: Negative for weakness and headaches.   Hematological: Does not bruise/bleed easily.   Psychiatric/Behavioral: Positive for dysphoric mood. Negative for hallucinations. The patient is nervous/anxious. The patient is not hyperactive.        Physical Exam     Initial Vitals [06/27/18 1053]   BP Pulse Resp Temp SpO2   (!) 199/87 75 16 98.9 °F (37.2 °C) 99 %      MAP       --         Physical Exam    Vitals reviewed.  Constitutional: He appears well-developed and well-nourished. He is not diaphoretic. No distress.   HENT:   Head: Normocephalic and atraumatic.   Nose: Nose normal.   Eyes: Conjunctivae and EOM are normal.   Neck: Normal range of motion.   Cardiovascular: Normal rate, regular rhythm and normal heart sounds. Exam reveals no friction rub.    No murmur heard.  Pulmonary/Chest: Breath sounds normal. No respiratory distress. He has no wheezes. He has no rales.   Abdominal: Soft. Bowel sounds are normal. He exhibits no distension. There is no tenderness.   Musculoskeletal: Normal range of motion.   Neurological: He is alert and oriented to person, place, and time. He has normal strength. No sensory deficit.   Skin: Skin is warm and dry. No erythema.   Psychiatric: Judgment and thought content normal. His mood appears anxious. His speech is not rapid and/or pressured. He is not agitated, not slowed and not actively hallucinating. Thought content is not paranoid and not delusional. Cognition and memory are normal. He exhibits a depressed mood. He expresses no homicidal and no suicidal ideation. He expresses no suicidal plans and no homicidal plans. He is attentive.         ED Course   Procedures  Labs Reviewed   CBC W/ AUTO  "DIFFERENTIAL - Abnormal; Notable for the following:        Result Value    MPV 8.9 (*)     All other components within normal limits   COMPREHENSIVE METABOLIC PANEL - Abnormal; Notable for the following:     Alkaline Phosphatase 49 (*)     All other components within normal limits   ACETAMINOPHEN LEVEL - Abnormal; Notable for the following:     Acetaminophen (Tylenol), Serum <3.0 (*)     All other components within normal limits   SALICYLATE LEVEL - Abnormal; Notable for the following:     Salicylate Lvl <5.0 (*)     All other components within normal limits   TSH   DRUG SCREEN PANEL, URINE EMERGENCY    Narrative:     Preferred Collection Type->Urine, Clean Catch   ALCOHOL,MEDICAL (ETHANOL)   URINALYSIS, REFLEX TO URINE CULTURE    Narrative:     Preferred Collection Type->Urine, Clean Catch          Imaging Results    None          Medical Decision Making:   History:   Old Medical Records: I decided to obtain old medical records.  Clinical Tests:   Lab Tests: Ordered and Reviewed       APC / Resident Notes:   Patient presents to the ED with his wife for dysphoric mood for the past 1 month. His wife noted that patient was suicidal, but she later said that she "wish I hadn't said that" because it is not true.     On exam, VSS. NAD and nontoxic. RRR. LCTA bilaterally. Abd soft. Normal mood and affect. Patient dressed well with proper hygiene. He exhibits anxiety and stress about recent life events. He is not suicidal or homocidal. No hallucinations or paranoia. Patient became tearful on two occasions, once when discuss his son's behavior and when admitting that he is feeling stress and is adamantly trying to find help.    Patient is not suicidal or homicidal. He is feeling stressed and depressed and is here mainly to start a medication for his recent mood change that has been going on and getting worse to were he felt like he needed to come into the ED.    His lab work is positive for THC, otherwise " unremarkable.    Case discussed with psychiatrist on call.  She is agreeable that patient is depressed and anxious and finds it appropriate to start Lexapro. Patient is to f/u with PCP and psychiatry as soon as possible. Strict ED return precaution for worsening symptoms such as SI, and patient understands. All questions answered. Patient is comfortable with plan and stable for d/c.        Scribe Attestation:   Scribe #1: I performed the above scribed service and the documentation accurately describes the services I performed. I attest to the accuracy of the note.    Attending Attestation:     Physician Attestation Statement for NP/PA:   I discussed this assessment and plan of this patient with the NP/PA, but I did not personally examine the patient. The face to face encounter was performed by the NP/PA.    Other NP/PA Attestation Additions:    History of Present Illness: Pt with depression. There is a report of possible suicidal ideations but clarified with the pt and wife and this is not the case. No HI or AVH. I see no emergent psych indications for evaluation at this time. Will start on antidepressant. Advised outpatient follow up and indications to return.          Physician Attestation for Scribe:      Comments: I, Dr. Moi Oliva, personally performed the services described in this documentation. All medical record entries made by the scribe were at my direction and in my presence.  I have reviewed the chart and agree that the record reflects my personal performance and is accurate and complete. Moi Oliva MD.  1:51 PM 06/27/2018                 Clinical Impression:   The encounter diagnosis was Dysphoric mood.      Disposition:   Disposition: Discharged  Condition: Stable                        Richa Mae PA-C  06/27/18 1029

## 2018-06-27 NOTE — DISCHARGE INSTRUCTIONS
Take Lexapro once a day.  You can try to take it at night if it makes her sleepy.  READ FOLLOWING PAGES ON RELAXATION TECHNIQUES AS THESE CAN BE HELPFUL. Follow up closely with your primary care physician or another primary care physician (internal medicine).  Call and follow up with Psychiatry as soon as possible.  Return to the emergency department for new or worsening symptoms such as suicidal thoughts, homicidal thoughts, hallucinations, worsening depression.    Future Appointments  Date Time Provider Department [place]   7/20/2018 8:00 AM [facility]   7/23/2018 9:00 AM Velasquez Garner MD [facility]       Our goal in the emergency department is to always give you outstanding care and exceptional service. You may receive a survey by mail or e-mail in the next week regarding your experience in our ED. We would greatly appreciate your completing and returning the survey. Your feedback provides us with a way to recognize our staff who give very good care and it helps us learn how to improve when your experience was below our aspiration of excellence.

## 2018-07-20 ENCOUNTER — LAB VISIT (OUTPATIENT)
Dept: LAB | Facility: HOSPITAL | Age: 58
End: 2018-07-20
Attending: FAMILY MEDICINE
Payer: COMMERCIAL

## 2018-07-20 DIAGNOSIS — Z00.00 ANNUAL PHYSICAL EXAM: ICD-10-CM

## 2018-07-20 LAB
ALBUMIN SERPL BCP-MCNC: 3.8 G/DL
ALP SERPL-CCNC: 39 U/L
ALT SERPL W/O P-5'-P-CCNC: 23 U/L
ANION GAP SERPL CALC-SCNC: 7 MMOL/L
AST SERPL-CCNC: 23 U/L
BASOPHILS # BLD AUTO: 0.04 K/UL
BASOPHILS NFR BLD: 0.7 %
BILIRUB SERPL-MCNC: 0.5 MG/DL
BUN SERPL-MCNC: 12 MG/DL
CALCIUM SERPL-MCNC: 9.5 MG/DL
CHLORIDE SERPL-SCNC: 106 MMOL/L
CHOLEST SERPL-MCNC: 255 MG/DL
CHOLEST/HDLC SERPL: 3.5 {RATIO}
CO2 SERPL-SCNC: 27 MMOL/L
COMPLEXED PSA SERPL-MCNC: 3.4 NG/ML
CREAT SERPL-MCNC: 0.9 MG/DL
DIFFERENTIAL METHOD: NORMAL
EOSINOPHIL # BLD AUTO: 0.2 K/UL
EOSINOPHIL NFR BLD: 3 %
ERYTHROCYTE [DISTWIDTH] IN BLOOD BY AUTOMATED COUNT: 12.8 %
EST. GFR  (AFRICAN AMERICAN): >60 ML/MIN/1.73 M^2
EST. GFR  (NON AFRICAN AMERICAN): >60 ML/MIN/1.73 M^2
GLUCOSE SERPL-MCNC: 88 MG/DL
HCT VFR BLD AUTO: 47.3 %
HDLC SERPL-MCNC: 72 MG/DL
HDLC SERPL: 28.2 %
HGB BLD-MCNC: 15.4 G/DL
IMM GRANULOCYTES # BLD AUTO: 0.01 K/UL
IMM GRANULOCYTES NFR BLD AUTO: 0.2 %
LDLC SERPL CALC-MCNC: 167.4 MG/DL
LYMPHOCYTES # BLD AUTO: 2.3 K/UL
LYMPHOCYTES NFR BLD: 41.6 %
MCH RBC QN AUTO: 29.3 PG
MCHC RBC AUTO-ENTMCNC: 32.6 G/DL
MCV RBC AUTO: 90 FL
MONOCYTES # BLD AUTO: 0.6 K/UL
MONOCYTES NFR BLD: 11.1 %
NEUTROPHILS # BLD AUTO: 2.4 K/UL
NEUTROPHILS NFR BLD: 43.4 %
NONHDLC SERPL-MCNC: 183 MG/DL
NRBC BLD-RTO: 0 /100 WBC
PLATELET # BLD AUTO: 244 K/UL
PMV BLD AUTO: 9.5 FL
POTASSIUM SERPL-SCNC: 4.5 MMOL/L
PROT SERPL-MCNC: 7.1 G/DL
RBC # BLD AUTO: 5.26 M/UL
SODIUM SERPL-SCNC: 140 MMOL/L
TRIGL SERPL-MCNC: 78 MG/DL
TSH SERPL DL<=0.005 MIU/L-ACNC: 1.2 UIU/ML
WBC # BLD AUTO: 5.6 K/UL

## 2018-07-20 PROCEDURE — 86803 HEPATITIS C AB TEST: CPT

## 2018-07-20 PROCEDURE — 84443 ASSAY THYROID STIM HORMONE: CPT

## 2018-07-20 PROCEDURE — 80053 COMPREHEN METABOLIC PANEL: CPT

## 2018-07-20 PROCEDURE — 80061 LIPID PANEL: CPT

## 2018-07-20 PROCEDURE — 85025 COMPLETE CBC W/AUTO DIFF WBC: CPT

## 2018-07-20 PROCEDURE — 84153 ASSAY OF PSA TOTAL: CPT

## 2018-07-20 PROCEDURE — 36415 COLL VENOUS BLD VENIPUNCTURE: CPT | Mod: PO

## 2018-07-23 ENCOUNTER — OFFICE VISIT (OUTPATIENT)
Dept: FAMILY MEDICINE | Facility: CLINIC | Age: 58
End: 2018-07-23
Payer: COMMERCIAL

## 2018-07-23 VITALS
WEIGHT: 161.63 LBS | TEMPERATURE: 98 F | SYSTOLIC BLOOD PRESSURE: 138 MMHG | BODY MASS INDEX: 24.49 KG/M2 | HEART RATE: 57 BPM | DIASTOLIC BLOOD PRESSURE: 80 MMHG | HEIGHT: 68 IN

## 2018-07-23 DIAGNOSIS — K51.90 ULCERATIVE COLITIS WITHOUT COMPLICATIONS, UNSPECIFIED LOCATION: ICD-10-CM

## 2018-07-23 DIAGNOSIS — Z29.9 PREVENTIVE MEASURE: Primary | ICD-10-CM

## 2018-07-23 DIAGNOSIS — Z00.00 ROUTINE GENERAL MEDICAL EXAMINATION AT A HEALTH CARE FACILITY: ICD-10-CM

## 2018-07-23 LAB — HCV AB SERPL QL IA: NEGATIVE

## 2018-07-23 PROCEDURE — 90715 TDAP VACCINE 7 YRS/> IM: CPT | Mod: S$GLB,,, | Performed by: FAMILY MEDICINE

## 2018-07-23 PROCEDURE — 99999 PR PBB SHADOW E&M-EST. PATIENT-LVL III: CPT | Mod: PBBFAC,,, | Performed by: FAMILY MEDICINE

## 2018-07-23 PROCEDURE — 90471 IMMUNIZATION ADMIN: CPT | Mod: S$GLB,,, | Performed by: FAMILY MEDICINE

## 2018-07-23 PROCEDURE — 99396 PREV VISIT EST AGE 40-64: CPT | Mod: 25,S$GLB,, | Performed by: FAMILY MEDICINE

## 2018-07-23 RX ORDER — ALPRAZOLAM 0.5 MG/1
1 TABLET ORAL DAILY PRN
COMMUNITY
Start: 2018-07-11 | End: 2018-07-23 | Stop reason: SDUPTHER

## 2018-07-23 RX ORDER — ALPRAZOLAM 0.5 MG/1
0.5 TABLET ORAL DAILY PRN
Qty: 30 TABLET | Refills: 1 | Status: SHIPPED | OUTPATIENT
Start: 2018-07-23 | End: 2018-11-08

## 2018-07-23 NOTE — PROGRESS NOTES
Subjective:       Patient ID: Azam Bowden is a 58 y.o. male.    Chief Complaint: Annual Exam    Disclaimer: This note has been generated using voice-recognition software. There may be typographical errors that have been missed during proof-reading    59 yo presents for routine exam, last exam one year ago  Prior history of UC, followed by outside GI (Dr. Greenberg)  Immunizations:  Needs TdaP, will need to return for Shingrix when available      Review of Systems   Constitutional: Negative for activity change, appetite change, chills, fatigue, fever and unexpected weight change.   HENT: Negative for congestion, ear discharge, ear pain, hearing loss and sinus pressure.    Eyes: Negative for pain and visual disturbance.   Respiratory: Negative for cough, chest tightness and shortness of breath.    Cardiovascular: Negative for chest pain, palpitations and leg swelling.   Gastrointestinal: Negative for abdominal distention and abdominal pain.   Genitourinary: Negative for difficulty urinating, dysuria, frequency and hematuria.   Musculoskeletal: Negative for arthralgias, back pain, gait problem, joint swelling, myalgias, neck pain and neck stiffness.   Skin: Negative for rash.   Neurological: Negative for dizziness, tremors, speech difficulty, weakness, numbness and headaches.   Psychiatric/Behavioral: Positive for dysphoric mood. Negative for agitation and behavioral problems.        Seen in ED for depression, anxiety, started on Lexapro.  Pt has stopped medication and wishes to continue taking Xanax on prn basis.  He denies suicidal ideation or depression at present       Objective:      Physical Exam   Constitutional: He is oriented to person, place, and time. He appears well-developed and well-nourished.   HENT:   Head: Normocephalic.   Right Ear: Tympanic membrane and external ear normal.   Left Ear: Tympanic membrane and external ear normal.   Nose: Nose normal.   Mouth/Throat: Uvula is midline, oropharynx is  clear and moist and mucous membranes are normal.   Eyes: Conjunctivae and EOM are normal. Pupils are equal, round, and reactive to light.   Neck: Normal range of motion. Neck supple. No JVD present. Carotid bruit is not present. No thyroid mass and no thyromegaly present.   Cardiovascular: Normal rate, regular rhythm and normal heart sounds.    No murmur heard.  Pulmonary/Chest: Effort normal and breath sounds normal. He has no rales.   Abdominal: Soft. Bowel sounds are normal. He exhibits no mass. There is no tenderness. A hernia is present. Hernia confirmed positive in the right inguinal area. Hernia confirmed negative in the left inguinal area.   Genitourinary: Prostate normal, testes normal and penis normal. Right testis shows no mass and no tenderness. Left testis shows no mass and no tenderness.   Musculoskeletal: Normal range of motion. He exhibits no edema.   Lymphadenopathy:     He has no cervical adenopathy. No inguinal adenopathy noted on the right or left side.   Neurological: He is alert and oriented to person, place, and time. He has normal reflexes. No cranial nerve deficit.   Skin: Skin is warm. No lesion and no rash noted.   Psychiatric: He has a normal mood and affect. His speech is normal and behavior is normal. Judgment and thought content normal. His mood appears not anxious. Cognition and memory are normal. He does not exhibit a depressed mood. He expresses no suicidal ideation.       Assessment:       1.  Physical exam  2.  Depression  3.  Ulcerative colitis    Plan:       1.  Labs reviewed with pt  2.  Continue present medications  3.  TdaP today, return for Shingrix

## 2018-10-02 ENCOUNTER — OFFICE VISIT (OUTPATIENT)
Dept: OPTOMETRY | Facility: CLINIC | Age: 58
End: 2018-10-02
Payer: COMMERCIAL

## 2018-10-02 DIAGNOSIS — H43.393 VITREOUS FLOATER, BILATERAL: Primary | ICD-10-CM

## 2018-10-02 DIAGNOSIS — H52.4 PRESBYOPIA: ICD-10-CM

## 2018-10-02 DIAGNOSIS — Z13.5 GLAUCOMA SCREENING: ICD-10-CM

## 2018-10-02 PROCEDURE — 99999 PR PBB SHADOW E&M-EST. PATIENT-LVL II: CPT | Mod: PBBFAC,,, | Performed by: OPTOMETRIST

## 2018-10-02 PROCEDURE — 92004 COMPRE OPH EXAM NEW PT 1/>: CPT | Mod: S$GLB,,, | Performed by: OPTOMETRIST

## 2018-10-02 PROCEDURE — 92015 DETERMINE REFRACTIVE STATE: CPT | Mod: S$GLB,,, | Performed by: OPTOMETRIST

## 2018-10-02 NOTE — PROGRESS NOTES
HPI     NIC: 2017 out side provider   Pt states he had Cat SX about 5 years ago. Pt states after a year his Sx   he started having floaters really bad . Now hs is stil having floater but   there like a wind shield wipers  when he turns his eye it follows. This   started 5 years ago--constantly there.  Also cause blurry VA   No flashes   No gtts   Cat Sx 5 years ago     Last edited by Eddie Michel, OD on 10/2/2018  8:59 AM. (History)        ROS     Positive for: Eyes (cat surgery OU)    Negative for: Constitutional, Gastrointestinal, Neurological, Skin,   Genitourinary, Musculoskeletal, HENT, Endocrine, Cardiovascular,   Respiratory, Psychiatric, Allergic/Imm, Heme/Lymph    Last edited by Eddie Michel, OD on 10/2/2018  8:59 AM. (History)        Assessment /Plan     For exam results, see Encounter Report.    Vitreous floater, bilateral    Glaucoma screening    Presbyopia      1. Sp MFIOL OU--pt happy without spex  2. 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  They started a few years ago and never got better.  Discussed possible surgical/laser options to correct.  Pt wishes to think about it    PLAN:    rtc 1 yr, or pt will call sooner if wishes appt w Dr Rushing for floater correction option

## 2018-10-17 ENCOUNTER — TELEPHONE (OUTPATIENT)
Dept: OPTOMETRY | Facility: CLINIC | Age: 58
End: 2018-10-17

## 2018-10-17 NOTE — TELEPHONE ENCOUNTER
----- Message from Eli Veloz sent at 10/17/2018  4:03 PM CDT -----  Contact: Joaquina Bowden(Mother)  Needs Advice    Reason for call:Pt called to f/u on getting a referral for floaters in ophthalmology.        Communication Preference:[phone]    Additional Information:

## 2018-11-08 ENCOUNTER — INITIAL CONSULT (OUTPATIENT)
Dept: OPHTHALMOLOGY | Facility: CLINIC | Age: 58
End: 2018-11-08
Payer: COMMERCIAL

## 2018-11-08 ENCOUNTER — TELEPHONE (OUTPATIENT)
Dept: OPHTHALMOLOGY | Facility: CLINIC | Age: 58
End: 2018-11-08

## 2018-11-08 DIAGNOSIS — H43.393 OTHER VITREOUS OPACITIES, BILATERAL: ICD-10-CM

## 2018-11-08 DIAGNOSIS — H43.813 POSTERIOR VITREOUS DETACHMENT, BILATERAL: Primary | ICD-10-CM

## 2018-11-08 DIAGNOSIS — H43.393 VITREOUS FLOATER, BILATERAL: ICD-10-CM

## 2018-11-08 DIAGNOSIS — H43.813 POSTERIOR VITREOUS DETACHMENT OF BOTH EYES: Primary | ICD-10-CM

## 2018-11-08 PROCEDURE — 92225 PR SPECIAL EYE EXAM, INITIAL: CPT | Mod: RT,S$GLB,, | Performed by: OPHTHALMOLOGY

## 2018-11-08 PROCEDURE — 92014 COMPRE OPH EXAM EST PT 1/>: CPT | Mod: S$GLB,,, | Performed by: OPHTHALMOLOGY

## 2018-11-08 PROCEDURE — 99999 PR PBB SHADOW E&M-EST. PATIENT-LVL II: CPT | Mod: PBBFAC,,, | Performed by: OPHTHALMOLOGY

## 2018-11-08 NOTE — PROGRESS NOTES
HPI     Eye Problem      Additional comments: consult per Anand/floaters              Comments     For a few years not he has been seeing a lot of floaters OU. At times it is like a windshield wiper going back and forth. Some days they are not too bothersome but other days they drive him nuts          Last edited by Jyoti Fleming on 11/8/2018  1:28 PM. (History)        A/P    1. Vitreous opacities a/w PVD OU  Pt having trouble reading and driving  May need PPV OU, start OS    Plan 25g PPV/partial AFx OS    Local MAC   LOC 20 min    Risks, benefits, and alternatives to treatment discussed in detail with the patient.  The patient voiced understanding and wished to proceed with the procedure    2. MFIOL OU      To OR

## 2018-11-27 ENCOUNTER — TELEPHONE (OUTPATIENT)
Dept: OPHTHALMOLOGY | Facility: CLINIC | Age: 58
End: 2018-11-27

## 2018-11-27 ENCOUNTER — ANESTHESIA EVENT (OUTPATIENT)
Dept: SURGERY | Facility: HOSPITAL | Age: 58
End: 2018-11-27
Payer: COMMERCIAL

## 2018-11-27 NOTE — H&P
Pre-Operative History & Physical  Ophthalmology      SUBJECTIVE:     History of Present Illness:  Patient is a 58 y.o. male presents with Posterior vitreous detachment of both eyes [H43.813]  Vitreous floater, bilateral [H43.393].    MEDICATIONS:   No medications prior to admission.       ALLERGIES: Review of patient's allergies indicates:  No Known Allergies    PAST MEDICAL HISTORY:   Past Medical History:   Diagnosis Date    GERD (gastroesophageal reflux disease)     Ulcerative colitis     Ulcerative colitis      PAST SURGICAL HISTORY:   Past Surgical History:   Procedure Laterality Date    anal fistula      CATARACT EXTRACTION, BILATERAL      COLONOSCOPY  2016    ulcerative colitis    TONSILLECTOMY       PAST FAMILY HISTORY:   Family History   Problem Relation Age of Onset    Cataracts Mother     Glaucoma Mother     Macular degeneration Mother     Diabetes Father     Cancer Father 75        Colon    Cataracts Father     Glaucoma Father     Cancer Sister     Cancer Brother         prostate cancer     SOCIAL HISTORY:   Social History     Tobacco Use    Smoking status: Former Smoker     Packs/day: 2.00     Years: 35.00     Pack years: 70.00     Types: Cigarettes     Last attempt to quit: 2/10/2009     Years since quittin.8    Smokeless tobacco: Former User   Substance Use Topics    Alcohol use: Yes     Alcohol/week: 3.0 oz     Types: 5 Cans of beer per week     Comment: occas, none x 1 week    Drug use: Yes     Frequency: 10.0 times per week        MENTAL STATUS: Alert    REVIEW OF SYSTEMS: Negative    OBJECTIVE:     Vital Signs (Most Recent)       Physical Exam:  General: NAD  HEENT: Atraumatic  Lungs: Adequate respirations, LCTAB  Heart: RRR, No murmur  Abdomen: Soft NT    ASSESSMENT/PLAN:     Patient is a 58 y.o. male with Posterior vitreous detachment of both eyes [H43.813]  Vitreous floater, bilateral [H43.393].     - Plan for surgical correction Plan 25g PPV/partial AFx OS     Local MAC    LOC 20 min     - Risks/benefits/alternatives of the procedure including, but not limited to scarring, bleeding, infection, loss or decreased vision, and/or need for possible repeat surgery discussed with the patient and family.   - Informed consent obtained prior to surgery and the patient/family voiced good understanding.    Walker Berrios  11/27/2018  9:58 AM

## 2018-11-28 ENCOUNTER — ANESTHESIA (OUTPATIENT)
Dept: SURGERY | Facility: HOSPITAL | Age: 58
End: 2018-11-28
Payer: COMMERCIAL

## 2018-11-28 ENCOUNTER — HOSPITAL ENCOUNTER (OUTPATIENT)
Facility: HOSPITAL | Age: 58
Discharge: HOME | End: 2018-11-28
Attending: OPHTHALMOLOGY | Admitting: OPHTHALMOLOGY
Payer: COMMERCIAL

## 2018-11-28 VITALS
DIASTOLIC BLOOD PRESSURE: 81 MMHG | OXYGEN SATURATION: 98 % | HEART RATE: 60 BPM | WEIGHT: 163 LBS | RESPIRATION RATE: 19 BRPM | SYSTOLIC BLOOD PRESSURE: 136 MMHG | BODY MASS INDEX: 24.71 KG/M2 | HEIGHT: 68 IN | TEMPERATURE: 98 F

## 2018-11-28 DIAGNOSIS — H43.813 POSTERIOR VITREOUS DETACHMENT, BILATERAL: ICD-10-CM

## 2018-11-28 DIAGNOSIS — H43.392 OTHER VITREOUS OPACITIES, LEFT EYE: ICD-10-CM

## 2018-11-28 DIAGNOSIS — H43.393 OTHER VITREOUS OPACITIES, BILATERAL: Primary | ICD-10-CM

## 2018-11-28 PROCEDURE — 37000008 HC ANESTHESIA 1ST 15 MINUTES: Performed by: OPHTHALMOLOGY

## 2018-11-28 PROCEDURE — D9220A PRA ANESTHESIA: Mod: ANES,,, | Performed by: ANESTHESIOLOGY

## 2018-11-28 PROCEDURE — 36000707: Performed by: OPHTHALMOLOGY

## 2018-11-28 PROCEDURE — 27201423 OPTIME MED/SURG SUP & DEVICES STERILE SUPPLY: Performed by: OPHTHALMOLOGY

## 2018-11-28 PROCEDURE — 36000706: Performed by: OPHTHALMOLOGY

## 2018-11-28 PROCEDURE — 63600175 PHARM REV CODE 636 W HCPCS: Performed by: NURSE ANESTHETIST, CERTIFIED REGISTERED

## 2018-11-28 PROCEDURE — 25000003 PHARM REV CODE 250: Performed by: OPHTHALMOLOGY

## 2018-11-28 PROCEDURE — D9220A PRA ANESTHESIA: Mod: CRNA,,, | Performed by: NURSE ANESTHETIST, CERTIFIED REGISTERED

## 2018-11-28 PROCEDURE — 71000015 HC POSTOP RECOV 1ST HR: Performed by: OPHTHALMOLOGY

## 2018-11-28 PROCEDURE — 67036 REMOVAL OF INNER EYE FLUID: CPT | Mod: LT,,, | Performed by: OPHTHALMOLOGY

## 2018-11-28 PROCEDURE — 37000009 HC ANESTHESIA EA ADD 15 MINS: Performed by: OPHTHALMOLOGY

## 2018-11-28 PROCEDURE — S0020 INJECTION, BUPIVICAINE HYDRO: HCPCS | Performed by: OPHTHALMOLOGY

## 2018-11-28 PROCEDURE — 63600175 PHARM REV CODE 636 W HCPCS: Performed by: OPHTHALMOLOGY

## 2018-11-28 PROCEDURE — 71000044 HC DOSC ROUTINE RECOVERY FIRST HOUR: Performed by: OPHTHALMOLOGY

## 2018-11-28 RX ORDER — PHENYLEPHRINE HYDROCHLORIDE 25 MG/ML
1 SOLUTION/ DROPS OPHTHALMIC
Status: DISCONTINUED | OUTPATIENT
Start: 2018-11-28 | End: 2018-11-28

## 2018-11-28 RX ORDER — EPINEPHRINE 0.1 MG/ML
INJECTION INTRAVENOUS
Status: DISCONTINUED | OUTPATIENT
Start: 2018-11-28 | End: 2018-11-28 | Stop reason: HOSPADM

## 2018-11-28 RX ORDER — ACETAMINOPHEN 325 MG/1
650 TABLET ORAL EVERY 4 HOURS PRN
Status: DISCONTINUED | OUTPATIENT
Start: 2018-11-28 | End: 2018-11-28 | Stop reason: HOSPADM

## 2018-11-28 RX ORDER — DEXAMETHASONE SODIUM PHOSPHATE 4 MG/ML
INJECTION, SOLUTION INTRA-ARTICULAR; INTRALESIONAL; INTRAMUSCULAR; INTRAVENOUS; SOFT TISSUE
Status: DISCONTINUED | OUTPATIENT
Start: 2018-11-28 | End: 2018-11-28 | Stop reason: HOSPADM

## 2018-11-28 RX ORDER — TETRACAINE HYDROCHLORIDE 5 MG/ML
1 SOLUTION OPHTHALMIC
Status: DISCONTINUED | OUTPATIENT
Start: 2018-11-28 | End: 2022-02-14

## 2018-11-28 RX ORDER — ONDANSETRON 4 MG/1
4 TABLET, FILM COATED ORAL EVERY 8 HOURS PRN
Qty: 12 TABLET | Refills: 0 | Status: SHIPPED | OUTPATIENT
Start: 2018-11-28 | End: 2019-01-24

## 2018-11-28 RX ORDER — DEXTROSE 50 % IN WATER (D50W) INTRAVENOUS SYRINGE
Status: DISCONTINUED
Start: 2018-11-28 | End: 2018-11-28 | Stop reason: HOSPADM

## 2018-11-28 RX ORDER — MOXIFLOXACIN 5 MG/ML
1 SOLUTION/ DROPS OPHTHALMIC
Status: DISCONTINUED | OUTPATIENT
Start: 2018-11-28 | End: 2018-11-28

## 2018-11-28 RX ORDER — BUPIVACAINE HYDROCHLORIDE 7.5 MG/ML
INJECTION, SOLUTION EPIDURAL; RETROBULBAR
Status: DISCONTINUED | OUTPATIENT
Start: 2018-11-28 | End: 2018-11-28 | Stop reason: HOSPADM

## 2018-11-28 RX ORDER — CYCLOPENTOLATE HYDROCHLORIDE 10 MG/ML
1 SOLUTION/ DROPS OPHTHALMIC
Status: DISCONTINUED | OUTPATIENT
Start: 2018-11-28 | End: 2022-02-14

## 2018-11-28 RX ORDER — LIDOCAINE HCL/PF 100 MG/5ML
SYRINGE (ML) INTRAVENOUS
Status: DISCONTINUED | OUTPATIENT
Start: 2018-11-28 | End: 2018-11-28

## 2018-11-28 RX ORDER — INDOCYANINE GREEN AND WATER 25 MG
KIT INJECTION
Status: DISCONTINUED
Start: 2018-11-28 | End: 2018-11-28 | Stop reason: HOSPADM

## 2018-11-28 RX ORDER — NEOMYCIN SULFATE, POLYMYXIN B SULFATE, AND DEXAMETHASONE 3.5; 10000; 1 MG/G; [USP'U]/G; MG/G
OINTMENT OPHTHALMIC
Status: DISCONTINUED | OUTPATIENT
Start: 2018-11-28 | End: 2018-11-28 | Stop reason: HOSPADM

## 2018-11-28 RX ORDER — LIDOCAINE HYDROCHLORIDE 20 MG/ML
INJECTION, SOLUTION EPIDURAL; INFILTRATION; INTRACAUDAL; PERINEURAL
Status: DISCONTINUED | OUTPATIENT
Start: 2018-11-28 | End: 2018-11-28 | Stop reason: HOSPADM

## 2018-11-28 RX ORDER — LIDOCAINE HYDROCHLORIDE 10 MG/ML
INJECTION, SOLUTION EPIDURAL; INFILTRATION; INTRACAUDAL; PERINEURAL
Status: DISCONTINUED
Start: 2018-11-28 | End: 2018-11-28 | Stop reason: HOSPADM

## 2018-11-28 RX ORDER — TETRACAINE HYDROCHLORIDE 5 MG/ML
1 SOLUTION OPHTHALMIC
Status: DISCONTINUED | OUTPATIENT
Start: 2018-11-28 | End: 2018-11-28

## 2018-11-28 RX ORDER — NEOMYCIN SULFATE, POLYMYXIN B SULFATE, AND DEXAMETHASONE 3.5; 10000; 1 MG/G; [USP'U]/G; MG/G
OINTMENT OPHTHALMIC
Status: DISCONTINUED
Start: 2018-11-28 | End: 2018-11-28 | Stop reason: HOSPADM

## 2018-11-28 RX ORDER — SODIUM CHLORIDE 9 MG/ML
10 INJECTION, SOLUTION INTRAVENOUS CONTINUOUS
Status: DISCONTINUED | OUTPATIENT
Start: 2018-11-28 | End: 2018-11-28 | Stop reason: HOSPADM

## 2018-11-28 RX ORDER — VANCOMYCIN HYDROCHLORIDE 500 MG/10ML
INJECTION, POWDER, LYOPHILIZED, FOR SOLUTION INTRAVENOUS
Status: DISCONTINUED | OUTPATIENT
Start: 2018-11-28 | End: 2018-11-28 | Stop reason: HOSPADM

## 2018-11-28 RX ORDER — OXYCODONE AND ACETAMINOPHEN 5; 325 MG/1; MG/1
1 TABLET ORAL EVERY 6 HOURS PRN
Qty: 12 TABLET | Refills: 0 | Status: SHIPPED | OUTPATIENT
Start: 2018-11-28 | End: 2019-01-24

## 2018-11-28 RX ORDER — ONDANSETRON 8 MG/1
8 TABLET, ORALLY DISINTEGRATING ORAL EVERY 8 HOURS PRN
Status: DISCONTINUED | OUTPATIENT
Start: 2018-11-28 | End: 2018-11-28 | Stop reason: HOSPADM

## 2018-11-28 RX ORDER — PROPOFOL 10 MG/ML
VIAL (ML) INTRAVENOUS
Status: DISCONTINUED | OUTPATIENT
Start: 2018-11-28 | End: 2018-11-28

## 2018-11-28 RX ORDER — MIDAZOLAM HYDROCHLORIDE 1 MG/ML
INJECTION, SOLUTION INTRAMUSCULAR; INTRAVENOUS
Status: DISCONTINUED | OUTPATIENT
Start: 2018-11-28 | End: 2018-11-28

## 2018-11-28 RX ORDER — MOXIFLOXACIN 5 MG/ML
1 SOLUTION/ DROPS OPHTHALMIC
Status: DISCONTINUED | OUTPATIENT
Start: 2018-11-28 | End: 2022-02-14

## 2018-11-28 RX ORDER — TROPICAMIDE 10 MG/ML
1 SOLUTION/ DROPS OPHTHALMIC
Status: DISCONTINUED | OUTPATIENT
Start: 2018-11-28 | End: 2022-02-14

## 2018-11-28 RX ORDER — SODIUM CHLORIDE 0.9 % (FLUSH) 0.9 %
3 SYRINGE (ML) INJECTION
Status: DISCONTINUED | OUTPATIENT
Start: 2018-11-28 | End: 2018-11-28 | Stop reason: HOSPADM

## 2018-11-28 RX ORDER — LIDOCAINE HYDROCHLORIDE 10 MG/ML
1 INJECTION, SOLUTION EPIDURAL; INFILTRATION; INTRACAUDAL; PERINEURAL ONCE
Status: DISCONTINUED | OUTPATIENT
Start: 2018-11-28 | End: 2018-11-28 | Stop reason: HOSPADM

## 2018-11-28 RX ORDER — HYDROCODONE BITARTRATE AND ACETAMINOPHEN 5; 325 MG/1; MG/1
1 TABLET ORAL EVERY 4 HOURS PRN
Status: DISCONTINUED | OUTPATIENT
Start: 2018-11-28 | End: 2018-11-28 | Stop reason: HOSPADM

## 2018-11-28 RX ORDER — TROPICAMIDE 10 MG/ML
1 SOLUTION/ DROPS OPHTHALMIC
Status: DISCONTINUED | OUTPATIENT
Start: 2018-11-28 | End: 2018-11-28

## 2018-11-28 RX ORDER — CYCLOPENTOLATE HYDROCHLORIDE 10 MG/ML
1 SOLUTION/ DROPS OPHTHALMIC
Status: DISCONTINUED | OUTPATIENT
Start: 2018-11-28 | End: 2018-11-28

## 2018-11-28 RX ORDER — DEXAMETHASONE SODIUM PHOSPHATE 4 MG/ML
INJECTION, SOLUTION INTRA-ARTICULAR; INTRALESIONAL; INTRAMUSCULAR; INTRAVENOUS; SOFT TISSUE
Status: DISCONTINUED
Start: 2018-11-28 | End: 2018-11-28 | Stop reason: HOSPADM

## 2018-11-28 RX ORDER — VANCOMYCIN HYDROCHLORIDE 500 MG/10ML
INJECTION, POWDER, LYOPHILIZED, FOR SOLUTION INTRAVENOUS
Status: DISCONTINUED
Start: 2018-11-28 | End: 2018-11-28 | Stop reason: HOSPADM

## 2018-11-28 RX ORDER — EPINEPHRINE 1 MG/ML
INJECTION, SOLUTION INTRACARDIAC; INTRAMUSCULAR; INTRAVENOUS; SUBCUTANEOUS
Status: DISCONTINUED
Start: 2018-11-28 | End: 2018-11-28 | Stop reason: HOSPADM

## 2018-11-28 RX ORDER — PREDNISOLONE ACETATE 10 MG/ML
1 SUSPENSION/ DROPS OPHTHALMIC
Status: DISCONTINUED | OUTPATIENT
Start: 2018-11-28 | End: 2022-02-14

## 2018-11-28 RX ORDER — PREDNISOLONE ACETATE 10 MG/ML
1 SUSPENSION/ DROPS OPHTHALMIC
Status: DISCONTINUED | OUTPATIENT
Start: 2018-11-28 | End: 2018-11-28

## 2018-11-28 RX ORDER — PHENYLEPHRINE HYDROCHLORIDE 25 MG/ML
1 SOLUTION/ DROPS OPHTHALMIC
Status: DISCONTINUED | OUTPATIENT
Start: 2018-11-28 | End: 2022-02-14

## 2018-11-28 RX ORDER — BUPIVACAINE HYDROCHLORIDE 7.5 MG/ML
INJECTION, SOLUTION EPIDURAL; RETROBULBAR
Status: DISCONTINUED
Start: 2018-11-28 | End: 2018-11-28 | Stop reason: HOSPADM

## 2018-11-28 RX ORDER — LIDOCAINE HYDROCHLORIDE 20 MG/ML
INJECTION, SOLUTION EPIDURAL; INFILTRATION; INTRACAUDAL; PERINEURAL
Status: DISCONTINUED
Start: 2018-11-28 | End: 2018-11-28 | Stop reason: HOSPADM

## 2018-11-28 RX ADMIN — MIDAZOLAM HYDROCHLORIDE 1 MG: 1 INJECTION, SOLUTION INTRAMUSCULAR; INTRAVENOUS at 08:11

## 2018-11-28 RX ADMIN — TROPICAMIDE 1 DROP: 10 SOLUTION/ DROPS OPHTHALMIC at 07:11

## 2018-11-28 RX ADMIN — CYCLOPENTOLATE HYDROCHLORIDE 1 DROP: 10 SOLUTION/ DROPS OPHTHALMIC at 07:11

## 2018-11-28 RX ADMIN — PHENYLEPHRINE HYDROCHLORIDE 1 DROP: 25 SOLUTION/ DROPS OPHTHALMIC at 07:11

## 2018-11-28 RX ADMIN — PREDNISOLONE ACETATE 1 DROP: 10 SUSPENSION/ DROPS OPHTHALMIC at 07:11

## 2018-11-28 RX ADMIN — HYDROCODONE BITARTRATE AND ACETAMINOPHEN 1 TABLET: 5; 325 TABLET ORAL at 09:11

## 2018-11-28 RX ADMIN — HOMATROPINE HYDROBROMIDE 1 DROP: 50 SOLUTION OPHTHALMIC at 07:11

## 2018-11-28 RX ADMIN — PROPOFOL 20 MG: 10 INJECTION, EMULSION INTRAVENOUS at 08:11

## 2018-11-28 RX ADMIN — LIDOCAINE HYDROCHLORIDE 40 MG: 20 INJECTION, SOLUTION INTRAVENOUS at 08:11

## 2018-11-28 RX ADMIN — MOXIFLOXACIN 1 DROP: 5 SOLUTION/ DROPS OPHTHALMIC at 07:11

## 2018-11-28 RX ADMIN — TETRACAINE HYDROCHLORIDE 1 DROP: 5 SOLUTION OPHTHALMIC at 07:11

## 2018-11-28 RX ADMIN — SODIUM CHLORIDE: 0.9 INJECTION, SOLUTION INTRAVENOUS at 07:11

## 2018-11-28 RX ADMIN — MIDAZOLAM HYDROCHLORIDE 2 MG: 1 INJECTION, SOLUTION INTRAMUSCULAR; INTRAVENOUS at 07:11

## 2018-11-28 RX ADMIN — PROPOFOL 70 MG: 10 INJECTION, EMULSION INTRAVENOUS at 08:11

## 2018-11-28 NOTE — OP NOTE
DATE OF PROCEDURE:  11/28/2018.    PREOPERATIVE DIAGNOSIS:  Visually significant vitreous opacities of the left   eye.    POSTOPERATIVE DIAGNOSIS:  Visually significant vitreous opacities of the left   eye.    PROCEDURES PERFORMED:  A 25-gauge pars plana vitrectomy with partial air-fluid   exchange to the left eye.    ATTENDING SURGEON:  DIANDRA Rushing M.D.    ASSISTANT SURGEON:  Todd Berrios.    ANESTHESIA:  Local monitored anesthesia care with a retrobulbar injection of 4.0   mL mixture of 0.75% Marcaine and 2% Xylocaine.    ESTIMATED BLOOD LOSS:  Minimal.    COMPLICATIONS:  None.    DISPOSITION:  Stable to Recovery.    INDICATIONS FOR SURGERY:  This is a 58-year-old male who has had significant   floaters not improving with observation over the last year.  The patient notes   that they are interfering with driving and reading.  He wanted them removed to   feel safer behind the wheel and to have less difficulty with his daily   activities.  Risks, benefits, and alternatives of surgery were discussed in   details with risks including loss of vision, loss of eye, retinal detachment,   infection, hemorrhage, lens dislocation, glaucoma, hypotony, ptosis and   diplopia.  The patient voiced understanding and wished to proceed with the   procedure.    DESCRIPTION OF PROCEDURE:  After proper informed consent was obtained, the   patient was brought back to the Operative Suite at [facility] where   MAC anesthesia was induced.  Retrobulbar injection was provided as above without   complication.  The patient was prepped and draped in normal sterile fashion for   ophthalmic surgery.  Lid speculum was placed in the left eye.  A standard   3-port 25-gauge pars plana vitrectomy was set up with the infusion cannula   inserted 3.5 mm posterior to the limbus.  The infusion cannula was turned on   only after observed to be free and clear of all underlying retinal tissue.    Supranasal and supratemporal trocars  were also placed 3.5 mm posterior to the   limbus.  The vitrector and light pipe were introduced in the vitreous cavity and   a core vitrectomy was performed.  Posterior hyaloid was already  from   the posterior pole.  It was propagated out to the level of the vitreous base.  A   thorough cortical vitrectomy was performed with the assistance of scleral   depression.  No identifiable retinal breaks were found.  A partial air-fluid   exchange was performed.  The trocars were removed from the eye and not leaking   after gentle massage.  The eye was normal pressure via palpation.    Subconjunctival injection of vancomycin and Decadron were given to the patient.    The drapes were removed from the patient.  He was washed free of Betadine prep   solution.  Maxitrol ointment was placed in the left eye.  The eye was patch   shielded.  MAC anesthesia was reversed.  He was brought to the Recovery Room in   stable condition, tolerating the procedure well.  Dr. Rushing was present for   the entire case.      GENOVEVA  dd: 11/28/2018 08:50:03 (CST)  td: 11/28/2018 09:33:26 (CST)  Doc ID   #[id number]  Job ID #[id number]    CC:

## 2018-11-28 NOTE — ANESTHESIA PREPROCEDURE EVALUATION
11/28/2018  Azam Bowden is a 58 y.o., male.    Anesthesia Evaluation    I have reviewed the Patient Summary Reports.    I have reviewed the Nursing Notes.   I have reviewed the Medications.     Review of Systems  Anesthesia Hx:  No problems with previous Anesthesia    Social:  Non-Smoker    Cardiovascular:  Cardiovascular Normal     Pulmonary:   Asthma asymptomatic    Neurological:   Denies CVA. Denies Seizures.        Physical Exam  General:  Well nourished    Airway/Jaw/Neck:  Airway Findings: Mouth Opening: Normal Tongue: Normal  General Airway Assessment: Adult      Dental:  Dental Findings: Upper Dentures, Lower Dentures   Chest/Lungs:  Chest/Lungs Findings: Normal Respiratory Rate     Heart/Vascular:  Heart Findings: Rate: Normal        Mental Status:  Mental Status Findings:  Cooperative, Alert and Oriented, Anxious         Anesthesia Plan  Type of Anesthesia, risks & benefits discussed:  Anesthesia Type:  MAC, general  Patient's Preference:   Intra-op Monitoring Plan: standard ASA monitors  Intra-op Monitoring Plan Comments:   Post Op Pain Control Plan:   Post Op Pain Control Plan Comments:   Induction:    Beta Blocker:  Patient is not currently on a Beta-Blocker (No further documentation required).       Informed Consent:    ASA Score: 2     Day of Surgery Review of History & Physical:    H&P update referred to the surgeon.         Ready For Surgery From Anesthesia Perspective.

## 2018-11-28 NOTE — PLAN OF CARE
Pt resting comfortably.    Call light in reach.    No questions or concerns at this time.    Wife has pt belongings

## 2018-11-28 NOTE — INTERVAL H&P NOTE
H&P completed on 11/27/18 has been reviewed, the patient has been examined and:  I concur with the findings and no changes have occurred since H&P was written.    Active Hospital Problems    Diagnosis  POA    Other vitreous opacities, left eye [H43.392]  Yes      Resolved Hospital Problems   No resolved problems to display.

## 2018-11-28 NOTE — BRIEF OP NOTE
Pre-Op Dx: Visually significant vitreous opacities OS    Post Op Dx: same    Procedure Performed: 25g PPV/partial AFx OS    Attending Surgeon: Сергей    Assistant Surgeon: Yash    Anesthesia: Local/Mac, retrobulbar injection of 4.0cc mixture 0.75%Marcaine, 2% Xylocaine    Estimated blood loss: Minimal    Complication: None    Specimen: None    Disposition: Stable to recovery    Findings/Outcome: dense central vitreous opacities OS    Date of Discharge: 11/28/18    Discharge Disposition: stable to recovery then home    F/U: tomorrow

## 2018-11-28 NOTE — ANESTHESIA POSTPROCEDURE EVALUATION
"Anesthesia Post Evaluation    Patient: Azam Bowden    Procedure(s) Performed: Procedure(s) (LRB):  VITRECTOMY, PARS PLANA APPROACH (Left)    Final Anesthesia Type: MAC  Patient location during evaluation: OPS  Patient participation: Yes- Able to Participate  Level of consciousness: awake and alert  Post-procedure vital signs: reviewed and stable  Pain management: adequate  Airway patency: patent  PONV status at discharge: No PONV  Anesthetic complications: no      Cardiovascular status: blood pressure returned to baseline and stable  Respiratory status: spontaneous ventilation, unassisted and room air  Hydration status: euvolemic  Follow-up not needed.        Visit Vitals  /81   Pulse 60   Temp 36.7 °C (98 °F) (Temporal)   Resp 19   Ht 5' 8" (1.727 m)   Wt 73.9 kg (163 lb)   SpO2 98%   BMI 24.78 kg/m²       Pain/Krystian Score: Pain Assessment Performed: Yes (11/28/2018  9:13 AM)  Presence of Pain: denies (11/28/2018  8:49 AM)  Pain Rating Prior to Med Admin: 4 (11/28/2018  9:05 AM)  Pain Rating Post Med Admin: 4 (11/28/2018  9:13 AM)  Krystian Score: 10 (11/28/2018  9:12 AM)        "

## 2018-11-28 NOTE — TRANSFER OF CARE
"Anesthesia Transfer of Care Note    Patient: Azam Bowden    Procedure(s) Performed: Procedure(s) (LRB):  VITRECTOMY, PARS PLANA APPROACH (Left)    Patient location: PACU    Anesthesia Type: MAC    Transport from OR: Transported from OR on room air with adequate spontaneous ventilation    Post pain: adequate analgesia    Post assessment: no apparent anesthetic complications and tolerated procedure well    Post vital signs: stable    Level of consciousness: awake, alert and oriented    Nausea/Vomiting: no nausea/vomiting    Complications: none    Transfer of care protocol was followed      Last vitals:   Visit Vitals  /84   Pulse 63   Temp 37.1 °C (98.8 °F) (Oral)   Resp 19   Ht 5' 8" (1.727 m)   Wt 73.9 kg (163 lb)   SpO2 98%   BMI 24.78 kg/m²     "

## 2018-11-29 ENCOUNTER — TELEPHONE (OUTPATIENT)
Dept: OPHTHALMOLOGY | Facility: CLINIC | Age: 58
End: 2018-11-29

## 2018-11-29 ENCOUNTER — OFFICE VISIT (OUTPATIENT)
Dept: OPHTHALMOLOGY | Facility: CLINIC | Age: 58
End: 2018-11-29
Payer: COMMERCIAL

## 2018-11-29 VITALS — HEART RATE: 61 BPM | DIASTOLIC BLOOD PRESSURE: 90 MMHG | SYSTOLIC BLOOD PRESSURE: 145 MMHG

## 2018-11-29 DIAGNOSIS — H43.392 VITREOUS FLOATERS, LEFT: Primary | ICD-10-CM

## 2018-11-29 DIAGNOSIS — H43.392 OTHER VITREOUS OPACITIES, LEFT EYE: Primary | ICD-10-CM

## 2018-11-29 PROCEDURE — 99999 PR PBB SHADOW E&M-EST. PATIENT-LVL III: CPT | Mod: PBBFAC,,, | Performed by: OPHTHALMOLOGY

## 2018-11-29 PROCEDURE — 99024 POSTOP FOLLOW-UP VISIT: CPT | Mod: S$GLB,,, | Performed by: OPHTHALMOLOGY

## 2018-11-29 NOTE — PROGRESS NOTES
HPI     Post op 1 day PPV OS 11/28/18    Last edited by Mi Batres on 11/29/2018  7:56 AM. (History)            A/P    1. Vitreous opacities a/w PVD OU  Pt having trouble reading and driving  May need PPV OU, start OS    s/p 25g PPV/partial AFx OS 11/28/18    Doing well, good IOP  Start gtts QID ointment/shield QHS    Plan 25g PPV/partial AFx OD  Local MAC   LOC 20min    2. MFIOL OU        1 week

## 2018-11-30 ENCOUNTER — PATIENT MESSAGE (OUTPATIENT)
Dept: ADMINISTRATIVE | Facility: OTHER | Age: 58
End: 2018-11-30

## 2018-12-01 ENCOUNTER — NURSE TRIAGE (OUTPATIENT)
Dept: ADMINISTRATIVE | Facility: CLINIC | Age: 58
End: 2018-12-01

## 2018-12-01 NOTE — TELEPHONE ENCOUNTER
Reason for Disposition   SEVERE coughing spells (e.g., whooping sound after coughing, vomiting after coughing)    Protocols used: ST COUGH - ACUTE ANLHCCHALP-T-FA    Wife calling regarding Pt having recent eye sx and now having coughing spells.  Wife requesting prescription for cough.  Care advice given.  Wife would rather speak with On Call MD; Paged and discussed with On Call (ANIYA Barrientos MD); MD recommended OTC cough meds and follow up with Dr. Rushing on Monday.  Also advised .

## 2018-12-06 ENCOUNTER — OFFICE VISIT (OUTPATIENT)
Dept: OPHTHALMOLOGY | Facility: CLINIC | Age: 58
End: 2018-12-06
Payer: COMMERCIAL

## 2018-12-06 DIAGNOSIS — H43.393 OTHER VITREOUS OPACITIES, BILATERAL: Primary | ICD-10-CM

## 2018-12-06 PROCEDURE — 99999 PR PBB SHADOW E&M-EST. PATIENT-LVL III: CPT | Mod: PBBFAC,,, | Performed by: OPHTHALMOLOGY

## 2018-12-06 PROCEDURE — 99024 POSTOP FOLLOW-UP VISIT: CPT | Mod: S$GLB,,, | Performed by: OPHTHALMOLOGY

## 2018-12-06 RX ORDER — NEOMYCIN SULFATE, POLYMYXIN B SULFATE, AND DEXAMETHASONE 3.5; 10000; 1 MG/G; [USP'U]/G; MG/G
OINTMENT OPHTHALMIC NIGHTLY
COMMUNITY
End: 2019-04-30

## 2018-12-06 RX ORDER — PREDNISOLONE ACETATE 10 MG/ML
1 SUSPENSION/ DROPS OPHTHALMIC 4 TIMES DAILY
COMMUNITY
End: 2019-01-24

## 2018-12-06 RX ORDER — MOXIFLOXACIN 5 MG/ML
1 SOLUTION/ DROPS OPHTHALMIC 4 TIMES DAILY
COMMUNITY
End: 2019-01-24

## 2018-12-06 NOTE — PROGRESS NOTES
HPI     Post-op Evaluation      Additional comments: 1 week check              Comments     DLS 11/29/18- Bubble went away after 5 days    PF x 4  HA x 4  vigamox x 4  Fina qhs        A/P    1. Vitreous opacities a/w PVD OU  Pt having trouble reading and driving  May need PPV OU, start OS    s/p 25g PPV/partial AFx OS 11/28/18    Doing well, IOP elevated - likely steroid respone  PF 2/1/0    Plan 25g PPV/partial AFx OD  Local MAC   LOC 20min  Pt with steroid response, only use PF BID on OD    2. MFIOL OU        TO OR

## 2018-12-11 ENCOUNTER — TELEPHONE (OUTPATIENT)
Dept: OPHTHALMOLOGY | Facility: CLINIC | Age: 58
End: 2018-12-11

## 2018-12-11 RX ORDER — IBUPROFEN 800 MG/1
800 TABLET ORAL 3 TIMES DAILY
COMMUNITY
End: 2019-04-30

## 2018-12-12 ENCOUNTER — ANESTHESIA EVENT (OUTPATIENT)
Dept: SURGERY | Facility: HOSPITAL | Age: 58
End: 2018-12-12
Payer: COMMERCIAL

## 2018-12-12 ENCOUNTER — ANESTHESIA (OUTPATIENT)
Dept: SURGERY | Facility: HOSPITAL | Age: 58
End: 2018-12-12
Payer: COMMERCIAL

## 2018-12-12 ENCOUNTER — HOSPITAL ENCOUNTER (OUTPATIENT)
Facility: HOSPITAL | Age: 58
Discharge: HOME | End: 2018-12-12
Attending: OPHTHALMOLOGY | Admitting: OPHTHALMOLOGY
Payer: COMMERCIAL

## 2018-12-12 VITALS
BODY MASS INDEX: 25.71 KG/M2 | HEIGHT: 66 IN | SYSTOLIC BLOOD PRESSURE: 158 MMHG | TEMPERATURE: 98 F | DIASTOLIC BLOOD PRESSURE: 77 MMHG | WEIGHT: 160 LBS | HEART RATE: 58 BPM | RESPIRATION RATE: 18 BRPM | OXYGEN SATURATION: 100 %

## 2018-12-12 DIAGNOSIS — H43.392 OTHER VITREOUS OPACITIES, LEFT EYE: Primary | ICD-10-CM

## 2018-12-12 DIAGNOSIS — H43.391 OTHER VITREOUS OPACITIES, RIGHT EYE: ICD-10-CM

## 2018-12-12 PROCEDURE — 25000003 PHARM REV CODE 250: Performed by: OPHTHALMOLOGY

## 2018-12-12 PROCEDURE — D9220A PRA ANESTHESIA: Mod: ANES,,, | Performed by: ANESTHESIOLOGY

## 2018-12-12 PROCEDURE — 27600004 OPTIME MED/SURG SUP & DEVICES INTRAOCULAR LENS: Performed by: OPHTHALMOLOGY

## 2018-12-12 PROCEDURE — 25000003 PHARM REV CODE 250: Performed by: ANESTHESIOLOGY

## 2018-12-12 PROCEDURE — 71000015 HC POSTOP RECOV 1ST HR: Performed by: OPHTHALMOLOGY

## 2018-12-12 PROCEDURE — 63600175 PHARM REV CODE 636 W HCPCS: Performed by: OPHTHALMOLOGY

## 2018-12-12 PROCEDURE — 37000009 HC ANESTHESIA EA ADD 15 MINS: Performed by: OPHTHALMOLOGY

## 2018-12-12 PROCEDURE — 67036 REMOVAL OF INNER EYE FLUID: CPT | Mod: 78,LT,, | Performed by: OPHTHALMOLOGY

## 2018-12-12 PROCEDURE — 25000003 PHARM REV CODE 250: Performed by: NURSE ANESTHETIST, CERTIFIED REGISTERED

## 2018-12-12 PROCEDURE — 27201423 OPTIME MED/SURG SUP & DEVICES STERILE SUPPLY: Performed by: OPHTHALMOLOGY

## 2018-12-12 PROCEDURE — 37000008 HC ANESTHESIA 1ST 15 MINUTES: Performed by: OPHTHALMOLOGY

## 2018-12-12 PROCEDURE — 25000003 PHARM REV CODE 250

## 2018-12-12 PROCEDURE — S0020 INJECTION, BUPIVICAINE HYDRO: HCPCS | Performed by: OPHTHALMOLOGY

## 2018-12-12 PROCEDURE — 36000706: Performed by: OPHTHALMOLOGY

## 2018-12-12 PROCEDURE — 63600175 PHARM REV CODE 636 W HCPCS: Performed by: NURSE ANESTHETIST, CERTIFIED REGISTERED

## 2018-12-12 PROCEDURE — D9220A PRA ANESTHESIA: Mod: CRNA,,, | Performed by: NURSE ANESTHETIST, CERTIFIED REGISTERED

## 2018-12-12 PROCEDURE — C1784 OCULAR DEV, INTRAOP, DET RET: HCPCS | Performed by: OPHTHALMOLOGY

## 2018-12-12 PROCEDURE — 36000707: Performed by: OPHTHALMOLOGY

## 2018-12-12 RX ORDER — CYCLOPENTOLATE HYDROCHLORIDE 10 MG/ML
1 SOLUTION/ DROPS OPHTHALMIC
Status: DISCONTINUED | OUTPATIENT
Start: 2018-12-12 | End: 2018-12-12 | Stop reason: HOSPADM

## 2018-12-12 RX ORDER — PROPOFOL 10 MG/ML
VIAL (ML) INTRAVENOUS
Status: DISCONTINUED | OUTPATIENT
Start: 2018-12-12 | End: 2018-12-12

## 2018-12-12 RX ORDER — LIDOCAINE HYDROCHLORIDE 20 MG/ML
INJECTION, SOLUTION EPIDURAL; INFILTRATION; INTRACAUDAL; PERINEURAL
Status: DISCONTINUED
Start: 2018-12-12 | End: 2018-12-12 | Stop reason: HOSPADM

## 2018-12-12 RX ORDER — MIDAZOLAM HYDROCHLORIDE 1 MG/ML
INJECTION, SOLUTION INTRAMUSCULAR; INTRAVENOUS
Status: DISCONTINUED | OUTPATIENT
Start: 2018-12-12 | End: 2018-12-12

## 2018-12-12 RX ORDER — ONDANSETRON 8 MG/1
8 TABLET, ORALLY DISINTEGRATING ORAL EVERY 8 HOURS PRN
Status: DISCONTINUED | OUTPATIENT
Start: 2018-12-12 | End: 2018-12-12 | Stop reason: HOSPADM

## 2018-12-12 RX ORDER — BUPIVACAINE HYDROCHLORIDE 7.5 MG/ML
INJECTION, SOLUTION EPIDURAL; RETROBULBAR
Status: DISCONTINUED | OUTPATIENT
Start: 2018-12-12 | End: 2018-12-12 | Stop reason: HOSPADM

## 2018-12-12 RX ORDER — NEOMYCIN SULFATE, POLYMYXIN B SULFATE, AND DEXAMETHASONE 3.5; 10000; 1 MG/G; [USP'U]/G; MG/G
OINTMENT OPHTHALMIC
Status: DISCONTINUED | OUTPATIENT
Start: 2018-12-12 | End: 2018-12-12 | Stop reason: HOSPADM

## 2018-12-12 RX ORDER — MOXIFLOXACIN 5 MG/ML
SOLUTION/ DROPS OPHTHALMIC
Status: DISCONTINUED | OUTPATIENT
Start: 2018-12-12 | End: 2018-12-12 | Stop reason: HOSPADM

## 2018-12-12 RX ORDER — LIDOCAINE HYDROCHLORIDE 10 MG/ML
INJECTION, SOLUTION EPIDURAL; INFILTRATION; INTRACAUDAL; PERINEURAL
Status: COMPLETED
Start: 2018-12-12 | End: 2018-12-12

## 2018-12-12 RX ORDER — LIDOCAINE HYDROCHLORIDE 10 MG/ML
1 INJECTION, SOLUTION EPIDURAL; INFILTRATION; INTRACAUDAL; PERINEURAL ONCE
Status: COMPLETED | OUTPATIENT
Start: 2018-12-12 | End: 2018-12-12

## 2018-12-12 RX ORDER — DEXAMETHASONE SODIUM PHOSPHATE 4 MG/ML
INJECTION, SOLUTION INTRA-ARTICULAR; INTRALESIONAL; INTRAMUSCULAR; INTRAVENOUS; SOFT TISSUE
Status: DISCONTINUED
Start: 2018-12-12 | End: 2018-12-12 | Stop reason: HOSPADM

## 2018-12-12 RX ORDER — OXYCODONE AND ACETAMINOPHEN 5; 325 MG/1; MG/1
1 TABLET ORAL EVERY 6 HOURS PRN
Qty: 12 TABLET | Refills: 0 | Status: SHIPPED | OUTPATIENT
Start: 2018-12-12 | End: 2019-01-24

## 2018-12-12 RX ORDER — BUPIVACAINE HYDROCHLORIDE 7.5 MG/ML
INJECTION, SOLUTION EPIDURAL; RETROBULBAR
Status: DISCONTINUED
Start: 2018-12-12 | End: 2018-12-12 | Stop reason: HOSPADM

## 2018-12-12 RX ORDER — INDOCYANINE GREEN AND WATER 25 MG
KIT INJECTION
Status: DISCONTINUED
Start: 2018-12-12 | End: 2018-12-12 | Stop reason: HOSPADM

## 2018-12-12 RX ORDER — VANCOMYCIN HYDROCHLORIDE 500 MG/10ML
INJECTION, POWDER, LYOPHILIZED, FOR SOLUTION INTRAVENOUS
Status: DISCONTINUED
Start: 2018-12-12 | End: 2018-12-12 | Stop reason: HOSPADM

## 2018-12-12 RX ORDER — MOXIFLOXACIN 5 MG/ML
1 SOLUTION/ DROPS OPHTHALMIC
Status: DISCONTINUED | OUTPATIENT
Start: 2018-12-12 | End: 2018-12-12 | Stop reason: HOSPADM

## 2018-12-12 RX ORDER — FENTANYL CITRATE 50 UG/ML
INJECTION, SOLUTION INTRAMUSCULAR; INTRAVENOUS
Status: DISCONTINUED | OUTPATIENT
Start: 2018-12-12 | End: 2018-12-12

## 2018-12-12 RX ORDER — SODIUM CHLORIDE 9 MG/ML
INJECTION, SOLUTION INTRAVENOUS CONTINUOUS
Status: DISCONTINUED | OUTPATIENT
Start: 2018-12-12 | End: 2018-12-12 | Stop reason: HOSPADM

## 2018-12-12 RX ORDER — SODIUM CHLORIDE 9 MG/ML
INJECTION, SOLUTION INTRAVENOUS CONTINUOUS PRN
Status: DISCONTINUED | OUTPATIENT
Start: 2018-12-12 | End: 2018-12-12

## 2018-12-12 RX ORDER — ACETAMINOPHEN 325 MG/1
650 TABLET ORAL EVERY 4 HOURS PRN
Status: DISCONTINUED | OUTPATIENT
Start: 2018-12-12 | End: 2018-12-12 | Stop reason: HOSPADM

## 2018-12-12 RX ORDER — ONDANSETRON 4 MG/1
4 TABLET, FILM COATED ORAL EVERY 8 HOURS PRN
Qty: 12 TABLET | Refills: 0 | Status: SHIPPED | OUTPATIENT
Start: 2018-12-12 | End: 2019-01-24

## 2018-12-12 RX ORDER — LIDOCAINE HCL/PF 100 MG/5ML
SYRINGE (ML) INTRAVENOUS
Status: DISCONTINUED | OUTPATIENT
Start: 2018-12-12 | End: 2018-12-12

## 2018-12-12 RX ORDER — HYDROCODONE BITARTRATE AND ACETAMINOPHEN 5; 325 MG/1; MG/1
1 TABLET ORAL EVERY 4 HOURS PRN
Status: DISCONTINUED | OUTPATIENT
Start: 2018-12-12 | End: 2018-12-12 | Stop reason: HOSPADM

## 2018-12-12 RX ORDER — EPINEPHRINE 1 MG/ML
INJECTION, SOLUTION INTRACARDIAC; INTRAMUSCULAR; INTRAVENOUS; SUBCUTANEOUS
Status: DISCONTINUED
Start: 2018-12-12 | End: 2018-12-12 | Stop reason: HOSPADM

## 2018-12-12 RX ORDER — LIDOCAINE HYDROCHLORIDE 20 MG/ML
INJECTION, SOLUTION EPIDURAL; INFILTRATION; INTRACAUDAL; PERINEURAL
Status: DISCONTINUED | OUTPATIENT
Start: 2018-12-12 | End: 2018-12-12 | Stop reason: HOSPADM

## 2018-12-12 RX ORDER — PHENYLEPHRINE HYDROCHLORIDE 25 MG/ML
1 SOLUTION/ DROPS OPHTHALMIC
Status: DISCONTINUED | OUTPATIENT
Start: 2018-12-12 | End: 2018-12-12 | Stop reason: HOSPADM

## 2018-12-12 RX ORDER — DEXAMETHASONE SODIUM PHOSPHATE 4 MG/ML
INJECTION, SOLUTION INTRA-ARTICULAR; INTRALESIONAL; INTRAMUSCULAR; INTRAVENOUS; SOFT TISSUE
Status: DISCONTINUED | OUTPATIENT
Start: 2018-12-12 | End: 2018-12-12 | Stop reason: HOSPADM

## 2018-12-12 RX ORDER — PREDNISOLONE ACETATE 10 MG/ML
1 SUSPENSION/ DROPS OPHTHALMIC
Status: DISCONTINUED | OUTPATIENT
Start: 2018-12-12 | End: 2018-12-12 | Stop reason: HOSPADM

## 2018-12-12 RX ORDER — TROPICAMIDE 10 MG/ML
1 SOLUTION/ DROPS OPHTHALMIC
Status: DISCONTINUED | OUTPATIENT
Start: 2018-12-12 | End: 2018-12-12 | Stop reason: HOSPADM

## 2018-12-12 RX ORDER — NEOMYCIN SULFATE, POLYMYXIN B SULFATE, AND DEXAMETHASONE 3.5; 10000; 1 MG/G; [USP'U]/G; MG/G
OINTMENT OPHTHALMIC
Status: DISCONTINUED
Start: 2018-12-12 | End: 2018-12-12 | Stop reason: HOSPADM

## 2018-12-12 RX ORDER — EPINEPHRINE CONVENIENCE KIT 1 MG/ML(1)
KIT INTRAMUSCULAR; SUBCUTANEOUS
Status: DISCONTINUED | OUTPATIENT
Start: 2018-12-12 | End: 2018-12-12 | Stop reason: HOSPADM

## 2018-12-12 RX ORDER — TETRACAINE HYDROCHLORIDE 5 MG/ML
1 SOLUTION OPHTHALMIC
Status: DISCONTINUED | OUTPATIENT
Start: 2018-12-12 | End: 2018-12-12 | Stop reason: HOSPADM

## 2018-12-12 RX ADMIN — HOMATROPINE HYDROBROMIDE 1 DROP: 50 SOLUTION OPHTHALMIC at 07:12

## 2018-12-12 RX ADMIN — FENTANYL CITRATE 25 MCG: 50 INJECTION, SOLUTION INTRAMUSCULAR; INTRAVENOUS at 09:12

## 2018-12-12 RX ADMIN — PROPOFOL 30 MG: 10 INJECTION, EMULSION INTRAVENOUS at 09:12

## 2018-12-12 RX ADMIN — TETRACAINE HYDROCHLORIDE 1 DROP: 5 SOLUTION OPHTHALMIC at 07:12

## 2018-12-12 RX ADMIN — TROPICAMIDE 1 DROP: 10 SOLUTION/ DROPS OPHTHALMIC at 07:12

## 2018-12-12 RX ADMIN — PHENYLEPHRINE HYDROCHLORIDE 1 DROP: 25 SOLUTION/ DROPS OPHTHALMIC at 07:12

## 2018-12-12 RX ADMIN — PROPOFOL 50 MG: 10 INJECTION, EMULSION INTRAVENOUS at 09:12

## 2018-12-12 RX ADMIN — MIDAZOLAM HYDROCHLORIDE 2 MG: 1 INJECTION, SOLUTION INTRAMUSCULAR; INTRAVENOUS at 09:12

## 2018-12-12 RX ADMIN — LIDOCAINE HYDROCHLORIDE 20 MG: 20 INJECTION, SOLUTION INTRAVENOUS at 09:12

## 2018-12-12 RX ADMIN — LIDOCAINE HYDROCHLORIDE 10 MG: 10 INJECTION, SOLUTION EPIDURAL; INFILTRATION; INTRACAUDAL; PERINEURAL at 07:12

## 2018-12-12 RX ADMIN — FENTANYL CITRATE 50 MCG: 50 INJECTION, SOLUTION INTRAMUSCULAR; INTRAVENOUS at 09:12

## 2018-12-12 RX ADMIN — CYCLOPENTOLATE HYDROCHLORIDE 1 DROP: 10 SOLUTION/ DROPS OPHTHALMIC at 08:12

## 2018-12-12 RX ADMIN — PREDNISOLONE ACETATE 1 DROP: 10 SUSPENSION/ DROPS OPHTHALMIC at 07:12

## 2018-12-12 RX ADMIN — MOXIFLOXACIN HYDROCHLORIDE 1 DROP: 5 SOLUTION/ DROPS OPHTHALMIC at 07:12

## 2018-12-12 RX ADMIN — SODIUM CHLORIDE: 0.9 INJECTION, SOLUTION INTRAVENOUS at 08:12

## 2018-12-12 RX ADMIN — PREDNISOLONE ACETATE 1 DROP: 10 SUSPENSION/ DROPS OPHTHALMIC at 08:12

## 2018-12-12 RX ADMIN — CYCLOPENTOLATE HYDROCHLORIDE 1 DROP: 10 SOLUTION/ DROPS OPHTHALMIC at 07:12

## 2018-12-12 RX ADMIN — HOMATROPINE HYDROBROMIDE 1 DROP: 50 SOLUTION OPHTHALMIC at 08:12

## 2018-12-12 RX ADMIN — MOXIFLOXACIN HYDROCHLORIDE 1 DROP: 5 SOLUTION/ DROPS OPHTHALMIC at 08:12

## 2018-12-12 RX ADMIN — PHENYLEPHRINE HYDROCHLORIDE 1 DROP: 25 SOLUTION/ DROPS OPHTHALMIC at 08:12

## 2018-12-12 RX ADMIN — FENTANYL CITRATE 25 MCG: 50 INJECTION, SOLUTION INTRAMUSCULAR; INTRAVENOUS at 10:12

## 2018-12-12 RX ADMIN — SODIUM CHLORIDE: 0.9 INJECTION, SOLUTION INTRAVENOUS at 09:12

## 2018-12-12 NOTE — PLAN OF CARE
Problem: Adult Inpatient Plan of Care  Goal: Plan of Care Review  Discharge instructions given and explained to pt and pt spouse. Both verbalized understanding. Pt meets criteria to be discharged home.

## 2018-12-12 NOTE — INTERVAL H&P NOTE
H&P completed on 12/12/2018   has been reviewed, the patient has been examined and:  I concur with the findings and no changes have occurred since H&P was written.    Active Hospital Problems    Diagnosis  POA    Other vitreous opacities, right eye [H43.391]  Yes      Resolved Hospital Problems   No resolved problems to display.

## 2018-12-12 NOTE — ANESTHESIA RELEASE NOTE
"Anesthesia Release from PACU Note    Patient: Azam Bowden    Procedure(s) Performed: Procedure(s) (LRB):  VITRECTOMY, PARS PLANA APPROACH (Right)    Anesthesia type: MAC    Post pain: Adequate analgesia    Post assessment: no apparent anesthetic complications, tolerated procedure well and no evidence of recall    Last Vitals:   Visit Vitals  BP (!) 156/70 (BP Location: Right arm, Patient Position: Lying)   Pulse (!) 58   Temp 37.1 °C (98.8 °F) (Temporal)   Resp 18   Ht 5' 6" (1.676 m)   Wt 72.6 kg (160 lb)   SpO2 100%   BMI 25.82 kg/m²       Post vital signs: stable    Level of consciousness: awake and alert     Nausea/Vomiting: no nausea/no vomiting    Complications: none    Airway Patency: patent    Respiratory: unassisted    Cardiovascular: stable and blood pressure at baseline    Hydration: euvolemic  "

## 2018-12-12 NOTE — BRIEF OP NOTE
Pre-Op Dx: visually significant vitreous opacities OD    Post Op Dx: same    Procedure Performed: 25g PPV/partial AFx OD    Attending Surgeon: Сергей    Assistant Surgeon: Yash    Anesthesia: Local/Mac, retrobulbar injection of 4.0cc mixture 0.75%Marcaine, 2% Xylocaine    Estimated blood loss: Minimal    Complication: None    Specimen: None    Disposition: Stable to recovery    Findings/Outcome: significant central vitreous opacities OD    Date of Discharge: 12/12/18    Discharge Disposition: stable to recovery then home    F/U: tomorrow

## 2018-12-12 NOTE — INTERVAL H&P NOTE
H&P completed on 12/12/2018   has been reviewed, the patient has been examined and:  I concur with the findings and no changes have occurred since H&P was written.    Active Hospital Problems    Diagnosis  POA    *Other vitreous opacities, right eye [H43.391]  Yes      Resolved Hospital Problems   No resolved problems to display.

## 2018-12-12 NOTE — OP NOTE
DATE OF PROCEDURE:  12/12/2018.    PREOPERATIVE DIAGNOSIS:  Visually significant vitreous opacities to the left   eye.    POSTOPERATIVE DIAGNOSIS:  Visually significant vitreous opacities to the left   eye.    PROCEDURES PERFORMED:  A 25-gauge pars plana vitrectomy with partial air-fluid   exchange to the left eye.    ATTENDING SURGEON:  DIANDRA Rushing M.D.    ASSISTANT SURGEON:  Fellow, Tremayne Berrios.    ANESTHESIA:  Local monitored anesthesia care with a retrobulbar injection of 4.0   mL mixture of 0.75% Marcaine and 2% Xylocaine.    ESTIMATED BLOOD LOSS:  Minimal.    COMPLICATIONS:  None.    DISPOSITION:  Stable to Recovery.    INDICATIONS FOR SURGERY:  This is a 58-year-old male with a history of bilateral   significant central vitreus floaters interfering with driving and reading.  The   patient had undergone surgery in the left eye with a good result, still had   significant floaters in his right eye that did not improve after several years   of observation.  Decision was made to take the patient back to the Operating   Room to remove the floaters and improve vision and improve quality of life.    Risks, benefits, and alternatives of surgery were discussed in detail with risks   including loss of vision, loss of eye, retinal detachment, infection,   hemorrhage, lens dislocation, glaucoma, hypotony, ptosis and diplopia.  The   patient voiced understanding and wished to proceed with the procedure.    DESCRIPTION OF PROCEDURE:  After proper informed consent was obtained, the   patient was brought back to the Operating Suite at [facility] where   MAC anesthesia was induced.  Retrobulbar injection was provided as above without   complication.  The patient was prepped and draped in a normal sterile fashion   for ophthalmic surgery.  Lid speculum was placed in the left eye.  A standard   3-port 25-gauge pars plana vitrectomy was set up with the infusion cannula   inserted 3.5 mm posterior to the  limbus.  The infusion was turned on only after   observed to be free and clear of all underlying retinal tissue.  Supranasal and   supratemporal trocars were also placed 3.5 mm posterior to the limbus.  The   vitrector and light pipe were introduced in the vitreous cavity and a core   vitrectomy was performed.  Posterior hyaloid was already elevated, but it was   propagated out to the level of the vitreous base.  Scleral depression was   performed 360 degrees to help with removal of the taut peripheral cortical   vitreous.  No identifiable retinal breaks were found.  A partial air-fluid   exchange was performed.  The trocars were removed from the eye and not leaking   after gentle massage and the eye was normal pressure via palpation.    Subconjunctival injections of vancomycin and Decadron were given to the patient.    The drapes were removed from the patient.  He was washed free of Betadine prep   solution.  Maxitrol ointment was placed in the left eye.  The eye was patched,   shielded.  MAC anesthesia was reversed.  He was brought to Recovery Room in   stable condition, tolerated the procedure well.  Dr. Rushing was present for   the entire case.      /michael [id number] ovidio(s)        CELSO/AKUA  dd: 12/12/2018 10:09:34 (CST)  td: 12/12/2018 10:45:25 (CST)  Doc ID   #[id number]  Job ID #[id number]    CC:

## 2018-12-12 NOTE — ANESTHESIA POSTPROCEDURE EVALUATION
"Anesthesia Post Evaluation    Patient: Azam Bowden    Procedure(s) Performed: Procedure(s) (LRB):  VITRECTOMY, PARS PLANA APPROACH (Right)    Final Anesthesia Type: MAC  Patient location during evaluation: PACU  Patient participation: Yes- Able to Participate  Level of consciousness: awake and alert  Post-procedure vital signs: reviewed and stable  Pain management: adequate  Airway patency: patent  PONV status at discharge: No PONV  Anesthetic complications: no      Cardiovascular status: stable  Respiratory status: unassisted and spontaneous ventilation  Hydration status: euvolemic  Follow-up not needed.        Visit Vitals  /72 (BP Location: Right arm, Patient Position: Lying)   Pulse (!) 54   Temp 37.1 °C (98.8 °F) (Temporal)   Resp 18   Ht 5' 6" (1.676 m)   Wt 72.6 kg (160 lb)   SpO2 98%   BMI 25.82 kg/m²       Pain/Krystian Score: No Data Recorded      "

## 2018-12-12 NOTE — ANESTHESIA PREPROCEDURE EVALUATION
12/12/2018  Azam Bowden is a 58 y.o., male.    Anesthesia Evaluation    I have reviewed the Patient Summary Reports.    I have reviewed the Nursing Notes.   I have reviewed the Medications.     Review of Systems  Anesthesia Hx:  No problems with previous Anesthesia  History of prior surgery of interest to airway management or planning: Denies Family Hx of Anesthesia complications.   Denies Personal Hx of Anesthesia complications.   Social:  Non-Smoker    Cardiovascular:  Cardiovascular Normal  Denies MI.   ECG has been reviewed.    Pulmonary:  Pulmonary Normal    Renal/:  Renal/ Normal     Hepatic/GI:   PUD, GERD    Musculoskeletal:  Musculoskeletal Normal    Neurological:  Neurology Normal  Denies CVA. Denies Seizures.        Physical Exam  General:  Well nourished    Airway/Jaw/Neck:  Airway Findings: Mouth Opening: Normal Tongue: Normal  General Airway Assessment: Adult  Jaw/Neck Findings:  Micrognathia: Negative Neck ROM: Normal ROM      Dental:  Dental Findings: In tact   Chest/Lungs:  Chest/Lungs Findings: Clear to auscultation, Normal Respiratory Rate     Heart/Vascular:  Heart Findings: Rate: Normal  Rhythm: Regular Rhythm  Sounds: Normal  Heart murmur: negative    Abdomen:  Abdomen Findings:  Normal, Nontender, Soft       Mental Status:  Mental Status Findings:  Cooperative, Alert and Oriented         Anesthesia Plan  Type of Anesthesia, risks & benefits discussed:  Anesthesia Type:  MAC, general  Patient's Preference:   Intra-op Monitoring Plan:   Intra-op Monitoring Plan Comments:   Post Op Pain Control Plan: multimodal analgesia, IV/PO Opioids PRN and per primary service following discharge from PACU  Post Op Pain Control Plan Comments:   Induction:   IV  Beta Blocker:  Patient is not currently on a Beta-Blocker (No further documentation required).       Informed Consent: Patient  understands risks and agrees with Anesthesia plan.  Questions answered. Anesthesia consent signed with patient.  ASA Score: 2     Day of Surgery Review of History & Physical:    H&P update referred to the surgeon.         Ready For Surgery From Anesthesia Perspective.

## 2018-12-12 NOTE — PLAN OF CARE
"Prepared patient for surgery. Medication reviewed. Patient unsure about eye drops he is taking to L eye. Stated he has only take the "pink top" (Pred-Forte?)  "

## 2018-12-12 NOTE — TRANSFER OF CARE
"Anesthesia Transfer of Care Note    Patient: Azam Bowden    Procedure(s) Performed: Procedure(s) (LRB):  VITRECTOMY, PARS PLANA APPROACH (Right)    Patient location: PACU    Anesthesia Type: general    Transport from OR: Transported from OR on room air with adequate spontaneous ventilation    Post pain: adequate analgesia    Post assessment: no apparent anesthetic complications and tolerated procedure well    Post vital signs: stable    Level of consciousness: awake, alert and oriented    Nausea/Vomiting: no nausea/vomiting    Complications: none    Transfer of care protocol was followed      Last vitals:   Visit Vitals  BP (!) 141/76 (BP Location: Left arm, Patient Position: Lying)   Pulse 62   Temp 36.9 °C (98.4 °F) (Oral)   Resp 18   Ht 5' 6" (1.676 m)   Wt 72.6 kg (160 lb)   SpO2 100%   BMI 25.82 kg/m²     "

## 2018-12-13 ENCOUNTER — OFFICE VISIT (OUTPATIENT)
Dept: OPHTHALMOLOGY | Facility: CLINIC | Age: 58
End: 2018-12-13
Payer: COMMERCIAL

## 2018-12-13 VITALS — HEART RATE: 67 BPM | SYSTOLIC BLOOD PRESSURE: 156 MMHG | DIASTOLIC BLOOD PRESSURE: 84 MMHG

## 2018-12-13 DIAGNOSIS — H43.393 OTHER VITREOUS OPACITIES, BILATERAL: Primary | ICD-10-CM

## 2018-12-13 PROCEDURE — 99999 PR PBB SHADOW E&M-EST. PATIENT-LVL III: CPT | Mod: PBBFAC,,, | Performed by: OPHTHALMOLOGY

## 2018-12-13 PROCEDURE — 99024 POSTOP FOLLOW-UP VISIT: CPT | Mod: S$GLB,,, | Performed by: OPHTHALMOLOGY

## 2018-12-13 NOTE — PROGRESS NOTES
HPI     1 dPO  25-gauge pars plana vitrectomy with partial air-fluid   exchange to the left eye.      Pt sts no problems at this time slept okay denies pain.    PF QD OS       A/P    1. Vitreous opacities a/w PVD OU  Pt having trouble reading and driving  May need PPV OU, start OS    s/p 25g PPV/partial AFx OS 11/28/18    Doing well, IOP elevated - likely steroid respone  PF 1/0    S/p 25g PPV/partial AFx OD 12/12/18    Doing well, good IOP  Start Vig QID, PF/HA BID    2. MFIOL OU        1 week

## 2018-12-18 ENCOUNTER — OFFICE VISIT (OUTPATIENT)
Dept: OPHTHALMOLOGY | Facility: CLINIC | Age: 58
End: 2018-12-18
Payer: COMMERCIAL

## 2018-12-18 ENCOUNTER — IMMUNIZATION (OUTPATIENT)
Dept: PHARMACY | Facility: CLINIC | Age: 58
End: 2018-12-18
Payer: COMMERCIAL

## 2018-12-18 DIAGNOSIS — H43.393 OTHER VITREOUS OPACITIES, BILATERAL: Primary | ICD-10-CM

## 2018-12-18 PROCEDURE — 99024 POSTOP FOLLOW-UP VISIT: CPT | Mod: S$GLB,,, | Performed by: OPHTHALMOLOGY

## 2018-12-18 PROCEDURE — 99999 PR PBB SHADOW E&M-EST. PATIENT-LVL III: CPT | Mod: PBBFAC,,, | Performed by: OPHTHALMOLOGY

## 2018-12-18 NOTE — PROGRESS NOTES
HPI     Post-op Evaluation      Additional comments: 5 day check              Comments     DLS 12/13/18    PF x 2 OD, daily OS  HA x 2 OD  vigamox x 4 OD      HPI     1 dPO  25-gauge pars plana vitrectomy with partial air-fluid   exchange to the left eye.      Pt sts no problems at this time slept okay denies pain.    PF QD OS       A/P    1. Vitreous opacities a/w PVD OU  Pt having trouble reading and driving  May need PPV OU, start OS    s/p 25g PPV/partial AFx OS 11/28/18    Doing well, IOP improved off taper,    S/p 25g PPV/partial AFx OD 12/12/18    Doing well, IOP slightly elevated - steroid response  Continue Vig QID x2 days then DC  Taper PF 2 until thursday/1/0    2. MFIOL OU        1 month

## 2019-01-18 ENCOUNTER — OFFICE VISIT (OUTPATIENT)
Dept: OPHTHALMOLOGY | Facility: CLINIC | Age: 59
End: 2019-01-18
Payer: COMMERCIAL

## 2019-01-18 DIAGNOSIS — H40.053 OCULAR HYPERTENSION, BILATERAL: Primary | ICD-10-CM

## 2019-01-18 PROCEDURE — 99999 PR PBB SHADOW E&M-EST. PATIENT-LVL II: ICD-10-PCS | Mod: PBBFAC,,, | Performed by: OPHTHALMOLOGY

## 2019-01-18 PROCEDURE — 99024 POSTOP FOLLOW-UP VISIT: CPT | Mod: S$GLB,,, | Performed by: OPHTHALMOLOGY

## 2019-01-18 PROCEDURE — 99999 PR PBB SHADOW E&M-EST. PATIENT-LVL II: CPT | Mod: PBBFAC,,, | Performed by: OPHTHALMOLOGY

## 2019-01-18 PROCEDURE — 99024 PR POST-OP FOLLOW-UP VISIT: ICD-10-PCS | Mod: S$GLB,,, | Performed by: OPHTHALMOLOGY

## 2019-01-18 RX ORDER — DORZOLAMIDE HYDROCHLORIDE AND TIMOLOL MALEATE 20; 5 MG/ML; MG/ML
1 SOLUTION/ DROPS OPHTHALMIC 2 TIMES DAILY
Qty: 10 ML | Refills: 1 | Status: SHIPPED | OUTPATIENT
Start: 2019-01-18 | End: 2019-01-29 | Stop reason: SDUPTHER

## 2019-01-18 NOTE — PROGRESS NOTES
HPI     Dr. Rushing pt Redness and headache x 5 days hx of high iop after sx   thinks could be high again     Pt c/o 5 days redness OS.  Maybe HA, but not sure.  Va is about the same.    No flashes.  Sees occ small floater OS    No gtts since end of December.    Last edited by Joel Underwood MD on 1/18/2019  1:12 PM. (History)            Assessment /Plan     For exam results, see Encounter Report.    Ocular hypertension, bilateral    Other orders  -     dorzolamide-timolol 2-0.5% (COSOPT) 22.3-6.8 mg/mL ophthalmic solution; Place 1 drop into the left eye 2 (two) times daily.  Dispense: 10 mL; Refill: 1      OHTN OS>OD 1 month post PPV  Not taking steroids       Cosopt 0/2    F/u Сергей next week as already scheduled, sooner PRN

## 2019-01-24 ENCOUNTER — OFFICE VISIT (OUTPATIENT)
Dept: OPHTHALMOLOGY | Facility: CLINIC | Age: 59
End: 2019-01-24
Payer: COMMERCIAL

## 2019-01-24 DIAGNOSIS — H40.003 GLAUCOMA SUSPECT OF BOTH EYES: ICD-10-CM

## 2019-01-24 DIAGNOSIS — H43.393 OTHER VITREOUS OPACITIES, BILATERAL: Primary | ICD-10-CM

## 2019-01-24 PROCEDURE — 99999 PR PBB SHADOW E&M-EST. PATIENT-LVL II: CPT | Mod: PBBFAC,,, | Performed by: OPHTHALMOLOGY

## 2019-01-24 PROCEDURE — 99024 POSTOP FOLLOW-UP VISIT: CPT | Mod: S$GLB,,, | Performed by: OPHTHALMOLOGY

## 2019-01-24 PROCEDURE — 99024 PR POST-OP FOLLOW-UP VISIT: ICD-10-PCS | Mod: S$GLB,,, | Performed by: OPHTHALMOLOGY

## 2019-01-24 PROCEDURE — 99999 PR PBB SHADOW E&M-EST. PATIENT-LVL II: ICD-10-PCS | Mod: PBBFAC,,, | Performed by: OPHTHALMOLOGY

## 2019-01-24 NOTE — PROGRESS NOTES
HPI     1 mo PO   DLS- 01/18/2019 Dr. Underwood     Pt sts still having headaches and not feeling well still   Still using gtts as prescribed       cosopt BID OS           A/P    1. Vitreous opacities a/w PVD OU  Pt having trouble reading and driving  May need PPV OU, start OS    s/p 25g PPV/partial AFx OS 11/28/18,    S/p 25g PPV/partial AFx OD 12/12/18    2. MFIOL OU    3. Glc suspect/OHTN  IOP elevated today and prior steroid response with family history  Glc consult with CHEYENNE      Retina PRN

## 2019-01-29 ENCOUNTER — INITIAL CONSULT (OUTPATIENT)
Dept: OPHTHALMOLOGY | Facility: CLINIC | Age: 59
End: 2019-01-29
Payer: COMMERCIAL

## 2019-01-29 DIAGNOSIS — H40.053 OCULAR HYPERTENSION, BILATERAL: ICD-10-CM

## 2019-01-29 DIAGNOSIS — H40.003 GLAUCOMA SUSPECT OF BOTH EYES: ICD-10-CM

## 2019-01-29 DIAGNOSIS — H40.003 OPEN ANGLE GLAUCOMA WITH BORDERLINE INTRAOCULAR PRESSURE, BILATERAL: Primary | ICD-10-CM

## 2019-01-29 DIAGNOSIS — H43.813 POSTERIOR VITREOUS DETACHMENT OF BOTH EYES: ICD-10-CM

## 2019-01-29 PROCEDURE — 99999 PR PBB SHADOW E&M-EST. PATIENT-LVL II: ICD-10-PCS | Mod: PBBFAC,,, | Performed by: OPHTHALMOLOGY

## 2019-01-29 PROCEDURE — 99999 PR PBB SHADOW E&M-EST. PATIENT-LVL II: CPT | Mod: PBBFAC,,, | Performed by: OPHTHALMOLOGY

## 2019-01-29 PROCEDURE — 92014 COMPRE OPH EXAM EST PT 1/>: CPT | Mod: S$GLB,,, | Performed by: OPHTHALMOLOGY

## 2019-01-29 PROCEDURE — 92014 PR EYE EXAM, EST PATIENT,COMPREHESV: ICD-10-PCS | Mod: S$GLB,,, | Performed by: OPHTHALMOLOGY

## 2019-01-29 RX ORDER — DORZOLAMIDE HYDROCHLORIDE AND TIMOLOL MALEATE 20; 5 MG/ML; MG/ML
1 SOLUTION/ DROPS OPHTHALMIC 2 TIMES DAILY
Qty: 10 ML | Refills: 11 | Status: SHIPPED | OUTPATIENT
Start: 2019-01-29 | End: 2020-10-29 | Stop reason: SDUPTHER

## 2019-01-29 NOTE — LETTER
January 29, 2019      DIANDRA Rushing MD  [address] [address]           [place] [facility]  [address]  [place] [address]  Phone: [phone]  Fax: [phone]          Patient: Azam Bowden   MR Number: [id number]   YOB: 1960   Date of Visit: 1/29/2019       Dear Dr. DIANDRA Rushing:    Thank you for referring Azam Bowden to me for evaluation. Attached you will find relevant portions of my assessment and plan of care.    If you have questions, please do not hesitate to call me. I look forward to following Azam Bowden along with you.    Sincerely,    Bertin Morales MD    Enclosure  CC:  No Recipients    If you would like to receive this communication electronically, please contact [email or web address] or [phone] to request more information on [employer] access.    For providers and/or their staff who would like to refer a patient to [facility], please contact us through our one-stop-shop provider referral line, [facility], at [phone].    If you feel you have received this communication in error or would no longer like to receive these types of communications, please e-mail [email or web address]

## 2019-01-29 NOTE — PROGRESS NOTES
HPI     DLS: 1/24/19 With Dr. Rushing    Pt here for Glaucoma Eval per Dr. Rushing;  Pt states his father has Glaucoma. Pt states OS was blood red yesterday x   2 weeks now but feels better today.    Meds: Cosopt bid os only    1) Vitreous opacities a/w PVD OU   --s/p 25g PPV/partial AFx OS (11/28/18)   --s/p 25g PPV/partial AFx OD (12/12/18)     2) MFIOL OU    Last edited by Amparo Worthy on 1/29/2019 10:30 AM. (History)            Assessment /Plan     For exam results, see Encounter Report.    Open angle glaucoma with borderline intraocular pressure, bilateral         Glaucoma (type and duration)    Glc Suspect, OHTN   First HVF   /   First photos   2019   Treatment / Drops started   Cosopt BID OS           Family history    +father        Glaucoma meds    Cosopt BID OS        H/O adverse rxn to glaucoma drops    //        LASERS    //        GLAUCOMA SURGERIES    //        OTHER EYE SURGERIES    Vitreous opacities a/w PVD OU --s/p 25g PPV/partial AFx OS (11/28/18) --s/p 25g PPV/partial AFx OD (12/12/18)         CDR    0.5/0.6        Tbase    //          Tmax    28/35            Ttarget    //             HVF    / test 20/ to  20/ - / od // / os        Gonio    +3/+3        CCT    /        OCT    / test 20/ to 20/ - RNFL - / od // / os        HRT    / test 20/ to 20/ - MR - / od // / os        Disc photos    2019    - Ttoday    22/20  - Test done today    Gonio, DFE, Photos    Pseudophakia  Monitor        IOP borderline OD>OS    Start Cosopt BID OU        3 months HVF/OCT/DFE/pachymetry

## 2019-04-30 ENCOUNTER — OFFICE VISIT (OUTPATIENT)
Dept: OPHTHALMOLOGY | Facility: CLINIC | Age: 59
End: 2019-04-30
Payer: COMMERCIAL

## 2019-04-30 ENCOUNTER — CLINICAL SUPPORT (OUTPATIENT)
Dept: OPHTHALMOLOGY | Facility: CLINIC | Age: 59
End: 2019-04-30
Payer: COMMERCIAL

## 2019-04-30 DIAGNOSIS — H43.393 OTHER VITREOUS OPACITIES, BILATERAL: ICD-10-CM

## 2019-04-30 DIAGNOSIS — H43.813 POSTERIOR VITREOUS DETACHMENT OF BOTH EYES: ICD-10-CM

## 2019-04-30 DIAGNOSIS — H40.003 OPEN ANGLE GLAUCOMA WITH BORDERLINE INTRAOCULAR PRESSURE, BILATERAL: ICD-10-CM

## 2019-04-30 DIAGNOSIS — Z98.890 H/O VITRECTOMY: ICD-10-CM

## 2019-04-30 DIAGNOSIS — H40.1131 PRIMARY OPEN ANGLE GLAUCOMA (POAG) OF BOTH EYES, MILD STAGE: Primary | ICD-10-CM

## 2019-04-30 PROCEDURE — 92012 PR EYE EXAM, EST PATIENT,INTERMED: ICD-10-PCS | Mod: S$GLB,,, | Performed by: OPHTHALMOLOGY

## 2019-04-30 PROCEDURE — 92083 HUMPHREY VISUAL FIELD - OU - BOTH EYES: ICD-10-PCS | Mod: S$GLB,,, | Performed by: OPHTHALMOLOGY

## 2019-04-30 PROCEDURE — 99999 PR PBB SHADOW E&M-EST. PATIENT-LVL II: ICD-10-PCS | Mod: PBBFAC,,, | Performed by: OPHTHALMOLOGY

## 2019-04-30 PROCEDURE — 92012 INTRM OPH EXAM EST PATIENT: CPT | Mod: S$GLB,,, | Performed by: OPHTHALMOLOGY

## 2019-04-30 PROCEDURE — 99999 PR PBB SHADOW E&M-EST. PATIENT-LVL II: CPT | Mod: PBBFAC,,, | Performed by: OPHTHALMOLOGY

## 2019-04-30 PROCEDURE — 92133 CPTRZD OPH DX IMG PST SGM ON: CPT | Mod: S$GLB,,, | Performed by: OPHTHALMOLOGY

## 2019-04-30 PROCEDURE — 92083 EXTENDED VISUAL FIELD XM: CPT | Mod: S$GLB,,, | Performed by: OPHTHALMOLOGY

## 2019-04-30 PROCEDURE — 92133 POSTERIOR SEGMENT OCT OPTIC NERVE(OCULAR COHERENCE TOMOGRAPHY) - OU - BOTH EYES: ICD-10-PCS | Mod: S$GLB,,, | Performed by: OPHTHALMOLOGY

## 2019-04-30 NOTE — PROGRESS NOTES
Assessment /Plan     For exam results, see Encounter Report.    Primary open angle glaucoma (POAG) of both eyes, mild stage    Open angle glaucoma with borderline intraocular pressure, bilateral  -     Posterior Segment OCT Optic Nerve- Both eyes    Posterior vitreous detachment of both eyes    Other vitreous opacities, bilateral    H/O vitrectomy         1.   Glaucoma (type and duration)   POAG / mild ou    First HVF   2019    First photos   2019   Treatment / Drops started   Cosopt BID OU - 2019            Family history    +father        Glaucoma meds    Cosopt BID ou         H/O adverse rxn to glaucoma drops    none        LASERS    none        GLAUCOMA SURGERIES    none        OTHER EYE SURGERIES  -  // PC IOL ou - multifocal - ? Where or when     PPVx   OU --s/p 25g PPV/partial AFx OS (11/28/18) --s/p 25g PPV/partial AFx OD (12/12/18) - For PVD w/ floaters ou          CDR    0.5/0.6        Tbase    16-28 // 16-35         Tmax    28/35            Ttarget    ?? About 21 ou            HVF    1 test 2019 to 2019 - early SAD od // early SAD os        Gonio    +3/+3        CCT    554/560        OCT    1 test 2019 to 2019 - RNFL - full od // dec TS os        HRT    / test 20/ to 20/ - MR - / od // / os        Disc photos    2019    - Ttoday    16/17  - Test done today    HVF/OCT/DFE/pachy    2. H/O PPVx ou    For severe floaters - ou - [place]     3. Pseudophakia   Multifocal ou    Monitor        PLAN  CSM   3 months HRT

## 2019-04-30 NOTE — PROGRESS NOTES
hvf done;rel/fix/coop. Good ou/chart checked for latex allergy and asked patient none noted-[facility]

## 2019-05-07 ENCOUNTER — TELEPHONE (OUTPATIENT)
Dept: PRIMARY CARE CLINIC | Facility: CLINIC | Age: 59
End: 2019-05-07

## 2019-05-07 DIAGNOSIS — Z00.00 ROUTINE GENERAL MEDICAL EXAMINATION AT A HEALTH CARE FACILITY: Primary | ICD-10-CM

## 2019-05-07 NOTE — TELEPHONE ENCOUNTER
----- Message from Kayli Doe sent at 5/7/2019  2:24 PM CDT -----  Contact: Pts wife Joaquina Cell # [phone]  Doctor appointment and lab have been scheduled.  Please link lab orders to the lab appointment.  Date of doctor appointment:  07/30/2019  Date of lab appointment:  07/26/2019  Physical or EP: Physical

## 2019-07-09 ENCOUNTER — TELEPHONE (OUTPATIENT)
Dept: OPHTHALMOLOGY | Facility: CLINIC | Age: 59
End: 2019-07-09

## 2019-07-17 ENCOUNTER — PATIENT OUTREACH (OUTPATIENT)
Dept: ADMINISTRATIVE | Facility: HOSPITAL | Age: 59
End: 2019-07-17

## 2019-07-26 ENCOUNTER — LAB VISIT (OUTPATIENT)
Dept: LAB | Facility: HOSPITAL | Age: 59
End: 2019-07-26
Attending: FAMILY MEDICINE
Payer: COMMERCIAL

## 2019-07-26 DIAGNOSIS — Z00.00 ROUTINE GENERAL MEDICAL EXAMINATION AT A HEALTH CARE FACILITY: ICD-10-CM

## 2019-07-26 LAB
ALBUMIN SERPL BCP-MCNC: 3.8 G/DL (ref 3.5–5.2)
ALP SERPL-CCNC: 43 U/L (ref 55–135)
ALT SERPL W/O P-5'-P-CCNC: 20 U/L (ref 10–44)
ANION GAP SERPL CALC-SCNC: 8 MMOL/L (ref 8–16)
AST SERPL-CCNC: 21 U/L (ref 10–40)
BASOPHILS # BLD AUTO: 0.03 K/UL (ref 0–0.2)
BASOPHILS NFR BLD: 0.5 % (ref 0–1.9)
BILIRUB SERPL-MCNC: 0.5 MG/DL (ref 0.1–1)
BUN SERPL-MCNC: 14 MG/DL (ref 8–23)
CALCIUM SERPL-MCNC: 9.9 MG/DL (ref 8.7–10.5)
CHLORIDE SERPL-SCNC: 107 MMOL/L (ref 95–110)
CHOLEST SERPL-MCNC: 255 MG/DL (ref 120–199)
CHOLEST/HDLC SERPL: 4.1 {RATIO} (ref 2–5)
CO2 SERPL-SCNC: 24 MMOL/L (ref 23–29)
COMPLEXED PSA SERPL-MCNC: 2.7 NG/ML (ref 0–4)
CREAT SERPL-MCNC: 0.9 MG/DL (ref 0.5–1.4)
DIFFERENTIAL METHOD: NORMAL
EOSINOPHIL # BLD AUTO: 0.2 K/UL (ref 0–0.5)
EOSINOPHIL NFR BLD: 3.7 % (ref 0–8)
ERYTHROCYTE [DISTWIDTH] IN BLOOD BY AUTOMATED COUNT: 12.5 % (ref 11.5–14.5)
EST. GFR  (AFRICAN AMERICAN): >60 ML/MIN/1.73 M^2
EST. GFR  (NON AFRICAN AMERICAN): >60 ML/MIN/1.73 M^2
GLUCOSE SERPL-MCNC: 97 MG/DL (ref 70–110)
HCT VFR BLD AUTO: 47.4 % (ref 40–54)
HDLC SERPL-MCNC: 62 MG/DL (ref 40–75)
HDLC SERPL: 24.3 % (ref 20–50)
HGB BLD-MCNC: 15.7 G/DL (ref 14–18)
IMM GRANULOCYTES # BLD AUTO: 0.01 K/UL (ref 0–0.04)
IMM GRANULOCYTES NFR BLD AUTO: 0.2 % (ref 0–0.5)
LDLC SERPL CALC-MCNC: 171 MG/DL (ref 63–159)
LYMPHOCYTES # BLD AUTO: 2.2 K/UL (ref 1–4.8)
LYMPHOCYTES NFR BLD: 35.7 % (ref 18–48)
MCH RBC QN AUTO: 29.6 PG (ref 27–31)
MCHC RBC AUTO-ENTMCNC: 33.1 G/DL (ref 32–36)
MCV RBC AUTO: 89 FL (ref 82–98)
MONOCYTES # BLD AUTO: 0.6 K/UL (ref 0.3–1)
MONOCYTES NFR BLD: 9 % (ref 4–15)
NEUTROPHILS # BLD AUTO: 3.2 K/UL (ref 1.8–7.7)
NEUTROPHILS NFR BLD: 50.9 % (ref 38–73)
NONHDLC SERPL-MCNC: 193 MG/DL
NRBC BLD-RTO: 0 /100 WBC
PLATELET # BLD AUTO: 239 K/UL (ref 150–350)
PMV BLD AUTO: 9.9 FL (ref 9.2–12.9)
POTASSIUM SERPL-SCNC: 4.2 MMOL/L (ref 3.5–5.1)
PROT SERPL-MCNC: 7.1 G/DL (ref 6–8.4)
RBC # BLD AUTO: 5.31 M/UL (ref 4.6–6.2)
SODIUM SERPL-SCNC: 139 MMOL/L (ref 136–145)
TRIGL SERPL-MCNC: 110 MG/DL (ref 30–150)
TSH SERPL DL<=0.005 MIU/L-ACNC: 0.76 UIU/ML (ref 0.4–4)
WBC # BLD AUTO: 6.25 K/UL (ref 3.9–12.7)

## 2019-07-26 PROCEDURE — 80061 LIPID PANEL: CPT

## 2019-07-26 PROCEDURE — 84153 ASSAY OF PSA TOTAL: CPT

## 2019-07-26 PROCEDURE — 84443 ASSAY THYROID STIM HORMONE: CPT

## 2019-07-26 PROCEDURE — 36415 COLL VENOUS BLD VENIPUNCTURE: CPT | Mod: PN

## 2019-07-26 PROCEDURE — 85025 COMPLETE CBC W/AUTO DIFF WBC: CPT

## 2019-07-26 PROCEDURE — 80053 COMPREHEN METABOLIC PANEL: CPT

## 2019-08-26 ENCOUNTER — HOSPITAL ENCOUNTER (EMERGENCY)
Facility: HOSPITAL | Age: 59
End: 2019-08-26
Attending: EMERGENCY MEDICINE
Payer: COMMERCIAL

## 2019-08-26 VITALS
DIASTOLIC BLOOD PRESSURE: 62 MMHG | SYSTOLIC BLOOD PRESSURE: 140 MMHG | RESPIRATION RATE: 16 BRPM | WEIGHT: 160 LBS | BODY MASS INDEX: 25.71 KG/M2 | OXYGEN SATURATION: 96 % | TEMPERATURE: 99 F | HEIGHT: 66 IN | HEART RATE: 60 BPM

## 2019-08-26 DIAGNOSIS — F39 MOOD DISORDER: ICD-10-CM

## 2019-08-26 DIAGNOSIS — F41.9 ANXIETY: ICD-10-CM

## 2019-08-26 DIAGNOSIS — R45.89 SUICIDAL RISK: ICD-10-CM

## 2019-08-26 DIAGNOSIS — Z00.8 MEDICAL CLEARANCE FOR PSYCHIATRIC ADMISSION: Primary | ICD-10-CM

## 2019-08-26 LAB
ALBUMIN SERPL BCP-MCNC: 3.9 G/DL (ref 3.5–5.2)
ALP SERPL-CCNC: 43 U/L (ref 55–135)
ALT SERPL W/O P-5'-P-CCNC: 19 U/L (ref 10–44)
AMPHET+METHAMPHET UR QL: NEGATIVE
ANION GAP SERPL CALC-SCNC: 14 MMOL/L (ref 8–16)
APAP SERPL-MCNC: <3 UG/ML (ref 10–20)
AST SERPL-CCNC: 22 U/L (ref 10–40)
BARBITURATES UR QL SCN>200 NG/ML: NEGATIVE
BASOPHILS # BLD AUTO: 0.03 K/UL (ref 0–0.2)
BASOPHILS NFR BLD: 0.6 % (ref 0–1.9)
BENZODIAZ UR QL SCN>200 NG/ML: NEGATIVE
BILIRUB SERPL-MCNC: 0.3 MG/DL (ref 0.1–1)
BILIRUB UR QL STRIP: NEGATIVE
BUN SERPL-MCNC: 13 MG/DL (ref 8–23)
BZE UR QL SCN: NEGATIVE
CALCIUM SERPL-MCNC: 9.4 MG/DL (ref 8.7–10.5)
CANNABINOIDS UR QL SCN: NORMAL
CHLORIDE SERPL-SCNC: 108 MMOL/L (ref 95–110)
CLARITY UR REFRACT.AUTO: CLEAR
CO2 SERPL-SCNC: 18 MMOL/L (ref 23–29)
COLOR UR AUTO: YELLOW
CREAT SERPL-MCNC: 0.9 MG/DL (ref 0.5–1.4)
CREAT UR-MCNC: 66 MG/DL (ref 23–375)
DIFFERENTIAL METHOD: ABNORMAL
EOSINOPHIL # BLD AUTO: 0.2 K/UL (ref 0–0.5)
EOSINOPHIL NFR BLD: 3.5 % (ref 0–8)
ERYTHROCYTE [DISTWIDTH] IN BLOOD BY AUTOMATED COUNT: 12.3 % (ref 11.5–14.5)
EST. GFR  (AFRICAN AMERICAN): >60 ML/MIN/1.73 M^2
EST. GFR  (NON AFRICAN AMERICAN): >60 ML/MIN/1.73 M^2
ETHANOL SERPL-MCNC: 37 MG/DL
GLUCOSE SERPL-MCNC: 89 MG/DL (ref 70–110)
GLUCOSE UR QL STRIP: NEGATIVE
HCT VFR BLD AUTO: 45 % (ref 40–54)
HGB BLD-MCNC: 15 G/DL (ref 14–18)
HGB UR QL STRIP: NEGATIVE
IMM GRANULOCYTES # BLD AUTO: 0.02 K/UL (ref 0–0.04)
IMM GRANULOCYTES NFR BLD AUTO: 0.4 % (ref 0–0.5)
KETONES UR QL STRIP: NEGATIVE
LEUKOCYTE ESTERASE UR QL STRIP: NEGATIVE
LYMPHOCYTES # BLD AUTO: 2.1 K/UL (ref 1–4.8)
LYMPHOCYTES NFR BLD: 41 % (ref 18–48)
MCH RBC QN AUTO: 29.4 PG (ref 27–31)
MCHC RBC AUTO-ENTMCNC: 33.3 G/DL (ref 32–36)
MCV RBC AUTO: 88 FL (ref 82–98)
METHADONE UR QL SCN>300 NG/ML: NEGATIVE
MONOCYTES # BLD AUTO: 0.4 K/UL (ref 0.3–1)
MONOCYTES NFR BLD: 7.6 % (ref 4–15)
NEUTROPHILS # BLD AUTO: 2.4 K/UL (ref 1.8–7.7)
NEUTROPHILS NFR BLD: 46.9 % (ref 38–73)
NITRITE UR QL STRIP: NEGATIVE
NRBC BLD-RTO: 0 /100 WBC
OPIATES UR QL SCN: NEGATIVE
PCP UR QL SCN>25 NG/ML: NEGATIVE
PH UR STRIP: 5 [PH] (ref 5–8)
PLATELET # BLD AUTO: 251 K/UL (ref 150–350)
PMV BLD AUTO: 9.1 FL (ref 9.2–12.9)
POTASSIUM SERPL-SCNC: 3.8 MMOL/L (ref 3.5–5.1)
PROT SERPL-MCNC: 7.1 G/DL (ref 6–8.4)
PROT UR QL STRIP: NEGATIVE
RBC # BLD AUTO: 5.1 M/UL (ref 4.6–6.2)
SODIUM SERPL-SCNC: 140 MMOL/L (ref 136–145)
SP GR UR STRIP: 1.01 (ref 1–1.03)
TOXICOLOGY INFORMATION: NORMAL
TSH SERPL DL<=0.005 MIU/L-ACNC: 1 UIU/ML (ref 0.4–4)
URN SPEC COLLECT METH UR: NORMAL
WBC # BLD AUTO: 5.15 K/UL (ref 3.9–12.7)

## 2019-08-26 PROCEDURE — 80307 DRUG TEST PRSMV CHEM ANLYZR: CPT

## 2019-08-26 PROCEDURE — 99285 EMERGENCY DEPT VISIT HI MDM: CPT | Mod: ,,, | Performed by: EMERGENCY MEDICINE

## 2019-08-26 PROCEDURE — 80329 ANALGESICS NON-OPIOID 1 OR 2: CPT

## 2019-08-26 PROCEDURE — 25000003 PHARM REV CODE 250: Performed by: EMERGENCY MEDICINE

## 2019-08-26 PROCEDURE — 80053 COMPREHEN METABOLIC PANEL: CPT

## 2019-08-26 PROCEDURE — 25000003 PHARM REV CODE 250: Performed by: STUDENT IN AN ORGANIZED HEALTH CARE EDUCATION/TRAINING PROGRAM

## 2019-08-26 PROCEDURE — 85025 COMPLETE CBC W/AUTO DIFF WBC: CPT

## 2019-08-26 PROCEDURE — 81003 URINALYSIS AUTO W/O SCOPE: CPT | Mod: 59

## 2019-08-26 PROCEDURE — 99285 EMERGENCY DEPT VISIT HI MDM: CPT

## 2019-08-26 PROCEDURE — 84443 ASSAY THYROID STIM HORMONE: CPT

## 2019-08-26 PROCEDURE — 99285 PR EMERGENCY DEPT VISIT,LEVEL V: ICD-10-PCS | Mod: ,,, | Performed by: EMERGENCY MEDICINE

## 2019-08-26 PROCEDURE — 80320 DRUG SCREEN QUANTALCOHOLS: CPT

## 2019-08-26 RX ORDER — LORAZEPAM 1 MG/1
1 TABLET ORAL
Status: COMPLETED | OUTPATIENT
Start: 2019-08-26 | End: 2019-08-26

## 2019-08-26 RX ORDER — LORAZEPAM 1 MG/1
1 TABLET ORAL EVERY 4 HOURS PRN
Status: DISCONTINUED | OUTPATIENT
Start: 2019-08-26 | End: 2019-08-26 | Stop reason: HOSPADM

## 2019-08-26 RX ORDER — MAG HYDROX/ALUMINUM HYD/SIMETH 200-200-20
30 SUSPENSION, ORAL (FINAL DOSE FORM) ORAL EVERY 6 HOURS PRN
Status: DISCONTINUED | OUTPATIENT
Start: 2019-08-26 | End: 2019-08-26 | Stop reason: HOSPADM

## 2019-08-26 RX ORDER — ACETAMINOPHEN 325 MG/1
650 TABLET ORAL EVERY 6 HOURS PRN
Status: DISCONTINUED | OUTPATIENT
Start: 2019-08-26 | End: 2019-08-26 | Stop reason: HOSPADM

## 2019-08-26 RX ADMIN — ALUMINUM HYDROXIDE, MAGNESIUM HYDROXIDE, AND SIMETHICONE 30 ML: 200; 200; 20 SUSPENSION ORAL at 08:08

## 2019-08-26 RX ADMIN — LORAZEPAM 1 MG: 1 TABLET ORAL at 03:08
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
## 2019-08-26 NOTE — ED PROVIDER NOTES
"Encounter Date: 8/26/2019       History     Chief Complaint   Patient presents with    Psychiatric Evaluation     anxiety r/t in middle of divorce.  denies si/hi.  [place] over [place] [address] [place]mph by [employer] and was "frantic"     HPI  Review of patient's allergies indicates:  No Known Allergies  Past Medical History:   Diagnosis Date    GERD (gastroesophageal reflux disease)     Glaucoma     Ulcerative colitis     Ulcerative colitis      Past Surgical History:   Procedure Laterality Date    anal fistula      CATARACT EXTRACTION W/  INTRAOCULAR LENS IMPLANT Bilateral n/a    done outside [facility]    CATARACT EXTRACTION, BILATERAL      COLONOSCOPY  2016    ulcerative colitis    TONSILLECTOMY      VITRECTOMY, PARS PLANA APPROACH Right 12/12/2018    [facility]ST [facility]LR    VITRECTOMY, PARS PLANA APPROACH Left 11/28/2018    Performed by [facility] at [facility] 1ST FLR     Family History   Problem Relation Age of Onset    Cataracts Mother     Glaucoma Mother     Macular degeneration Mother     Diabetes Father     Cancer Father 75        Colon    Cataracts Father     Glaucoma Father     Cancer Sister     Cancer Brother         prostate cancer    Amblyopia Neg Hx     Blindness Neg Hx     Hypertension Neg Hx     Retinal detachment Neg Hx     Strabismus Neg Hx      Social History     Tobacco Use    Smoking status: Former Smoker     Packs/day: 2.00     Years: 35.00     Pack years: 70.00     Types: Cigarettes     Last attempt to quit: 2/10/2009     Years since quitting: 10.5    Smokeless tobacco: Former User   Substance Use Topics    Alcohol use: Yes     Alcohol/week: 3.0 oz     Types: 5 Cans of beer per week     Comment: occas, none x 1 week    Drug use: Yes     Frequency: 10.0 times per week     Review of Systems    Physical Exam     Initial Vitals [08/26/19 0127]   BP Pulse Resp Temp SpO2   (!) 161/90 100 16 98.3 °F (36.8 °C) 98 %      MAP     "   --         Physical Exam    ED Course   Procedures  Labs Reviewed   CBC W/ AUTO DIFFERENTIAL - Abnormal; Notable for the following components:       Result Value    MPV 9.1 (*)     All other components within normal limits   COMPREHENSIVE METABOLIC PANEL - Abnormal; Notable for the following components:    CO2 18 (*)     Alkaline Phosphatase 43 (*)     All other components within normal limits   ALCOHOL,MEDICAL (ETHANOL) - Abnormal; Notable for the following components:    Alcohol, Medical, Serum 37 (*)     All other components within normal limits   ACETAMINOPHEN LEVEL - Abnormal; Notable for the following components:    Acetaminophen (Tylenol), Serum <3.0 (*)     All other components within normal limits   TSH   URINALYSIS, REFLEX TO URINE CULTURE    Narrative:     Preferred Collection Type->Urine, Clean Catch   DRUG SCREEN PANEL, URINE EMERGENCY    Narrative:     Preferred Collection Type->Urine, Clean Catch          Imaging Results    None                       Attending Attestation:   Physician Attestation Statement for Resident:  As the supervising MD   Physician Attestation Statement: I have personally seen and examined this patient.    As the supervising MD I agree with the above PE.    As the supervising MD I agree with the above treatment, course, plan, and disposition.   -: Family states pt got out firearms at home and threatened suicide which does not gel with what pt is stating. Pt is at risk for suicide. Will PEC and admit.                        Clinical Impression:       ICD-10-CM ICD-9-CM   1. Medical clearance for psychiatric admission Z00.8 V70.8   2. Anxiety F41.9 300.00   3. Suicidal risk R45.89 300.9   4. Mood disorder F39 296.90                                Fawad Nettles MD  08/26/19 9264
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
## 2019-08-26 NOTE — ED NOTES
Patient transferred to [facility] with Er staff and security with 1 bag of belongings and valuable bag. Patient searched for contraband. Belongings placed in  3 bin.  called and informed of transfer to [facility]. Spoke with David with the . DVC maintained.

## 2019-08-26 NOTE — ED NOTES
Patient denies SI, HI, V/A hallucinations. Patient reports he is depressed 6/10. Patient reports he drinks 5 beers daily and sometime more. No tremors noted. DVC maintained. Will continue to monitor.

## 2019-08-26 NOTE — ED NOTES
Patient identifiers verified and correct for Azam Bowden  LOC: The patient is awake, alert and aware of environment with an appropriate affect, the patient is oriented x 3 and speaking appropriately.   APPEARANCE: Patient appears comfortable and in no acute distress, patient is clean and well groomed.  SKIN: The skin is warm and dry, color consistent with ethnicity, patient has normal skin turgor and moist mucus membranes, skin intact, no breakdown or bruising noted.   MUSCULOSKELETAL: Patient moving all extremities spontaneously, no swelling noted.  RESPIRATORY: Airway is open and patent, respirations are spontaneous, patient has a normal effort and rate, no accessory muscle use noted, pt placed on continuous pulse ox with O2 sats noted at 97% on room air.  CARDIAC: Pt placed on cardiac monitor. Patient has a normal rate and regular rhythm, no edema noted, capillary refill < 3 seconds.   GASTRO: Soft and non tender to palpation, no distention noted, normoactive bowel sounds present in all four quadrants. Pt states bowel movements have been regular.  : Pt denies any pain or frequency with urination.  NEURO: Pt opens eyes spontaneously, behavior appropriate to situation, follows commands, facial expression symmetrical, bilateral hand grasp equal and even, purposeful motor response noted, normal sensation in all extremities when touched with a finger.

## 2019-08-26 NOTE — ED NOTES
Pt escorted to the [employer] vehicle by security and staff. Pt remains clam and cooperative. Pt informed he will be going to [facility] in [place].

## 2019-09-11 ENCOUNTER — PATIENT OUTREACH (OUTPATIENT)
Dept: ADMINISTRATIVE | Facility: HOSPITAL | Age: 59
End: 2019-09-11

## 2019-09-25 ENCOUNTER — IMMUNIZATION (OUTPATIENT)
Dept: PHARMACY | Facility: CLINIC | Age: 59
End: 2019-09-25

## 2019-09-25 ENCOUNTER — OFFICE VISIT (OUTPATIENT)
Dept: PRIMARY CARE CLINIC | Facility: CLINIC | Age: 59
End: 2019-09-25
Payer: COMMERCIAL

## 2019-09-25 ENCOUNTER — IMMUNIZATION (OUTPATIENT)
Dept: PHARMACY | Facility: CLINIC | Age: 59
End: 2019-09-25
Payer: COMMERCIAL

## 2019-09-25 VITALS
TEMPERATURE: 98 F | SYSTOLIC BLOOD PRESSURE: 130 MMHG | DIASTOLIC BLOOD PRESSURE: 60 MMHG | HEIGHT: 66 IN | HEART RATE: 60 BPM | WEIGHT: 159.19 LBS | BODY MASS INDEX: 25.58 KG/M2

## 2019-09-25 DIAGNOSIS — Z00.00 ROUTINE GENERAL MEDICAL EXAMINATION AT A HEALTH CARE FACILITY: Primary | ICD-10-CM

## 2019-09-25 DIAGNOSIS — K51.90 ULCERATIVE COLITIS WITHOUT COMPLICATIONS, UNSPECIFIED LOCATION: ICD-10-CM

## 2019-09-25 PROCEDURE — 99396 PREV VISIT EST AGE 40-64: CPT | Mod: S$GLB,,, | Performed by: FAMILY MEDICINE

## 2019-09-25 PROCEDURE — 99999 PR PBB SHADOW E&M-EST. PATIENT-LVL III: ICD-10-PCS | Mod: PBBFAC,,, | Performed by: FAMILY MEDICINE

## 2019-09-25 PROCEDURE — 99999 PR PBB SHADOW E&M-EST. PATIENT-LVL III: CPT | Mod: PBBFAC,,, | Performed by: FAMILY MEDICINE

## 2019-09-25 PROCEDURE — 99396 PR PREVENTIVE VISIT,EST,40-64: ICD-10-PCS | Mod: S$GLB,,, | Performed by: FAMILY MEDICINE

## 2019-09-25 RX ORDER — BUSPIRONE HYDROCHLORIDE 7.5 MG/1
7.5 TABLET ORAL 3 TIMES DAILY
Qty: 90 TABLET | Refills: 11 | Status: SHIPPED | OUTPATIENT
Start: 2019-09-25 | End: 2020-09-25

## 2019-09-25 NOTE — PROGRESS NOTES
Subjective:       Patient ID: Azam Bowden Jr. is a 59 y.o. male.    Chief Complaint: Annual Exam    58 yo presents today for routine exam  Prior history of UC, followed by outside GI  Recently admitted for Psych evaluation after presenting to ED on 8/26/19 with ETOH intoxication.  He is scheduled for f/u with Psych.  Presently having recurrent symptoms of anxiety, would like Xanax    Review of Systems   Constitutional: Negative for activity change, appetite change, chills, fatigue, fever and unexpected weight change.   HENT: Negative for congestion, ear discharge, ear pain, hearing loss and sinus pressure.    Eyes: Negative for pain and visual disturbance.   Respiratory: Negative for cough, chest tightness and shortness of breath.    Cardiovascular: Negative for chest pain, palpitations and leg swelling.   Gastrointestinal: Negative for abdominal distention and abdominal pain.   Genitourinary: Negative for difficulty urinating, dysuria, frequency and hematuria.   Musculoskeletal: Negative for arthralgias, back pain, gait problem, joint swelling, myalgias, neck pain and neck stiffness.   Skin: Negative for rash.   Neurological: Negative for dizziness, tremors, speech difficulty, weakness, numbness and headaches.   Psychiatric/Behavioral: Negative for agitation, behavioral problems, dysphoric mood, hallucinations and sleep disturbance. The patient is nervous/anxious.         Has been trying to decrease ETOH intake, now on 3 beers daily       Objective:      Physical Exam   Constitutional: He is oriented to person, place, and time. He appears well-developed and well-nourished.   HENT:   Head: Normocephalic.   Right Ear: Tympanic membrane, external ear and ear canal normal.   Left Ear: Tympanic membrane, external ear and ear canal normal.   Nose: Nose normal.   Mouth/Throat: Uvula is midline, oropharynx is clear and moist and mucous membranes are normal. No posterior oropharyngeal erythema.   Eyes: Pupils are equal,  round, and reactive to light. Conjunctivae and EOM are normal.   Neck: Normal range of motion. Neck supple. No JVD present. Carotid bruit is not present. No thyroid mass and no thyromegaly present.   Cardiovascular: Normal rate, regular rhythm and normal heart sounds.   No murmur heard.  Pulmonary/Chest: Effort normal and breath sounds normal. He has no rales.   Abdominal: Soft. Bowel sounds are normal. He exhibits no mass. There is no tenderness. A hernia is present. Hernia confirmed positive in the right inguinal area. Hernia confirmed negative in the left inguinal area.   Genitourinary: Testes normal and penis normal. Right testis shows no mass and no tenderness. Left testis shows no mass and no tenderness.   Genitourinary Comments: Reducible [facility]   Musculoskeletal: Normal range of motion. He exhibits no edema.   Lymphadenopathy:     He has no cervical adenopathy. No inguinal adenopathy noted on the right or left side.   Neurological: He is alert and oriented to person, place, and time. He has normal reflexes. No cranial nerve deficit.   Skin: Skin is warm. No lesion and no rash noted.   Psychiatric: He has a normal mood and affect.       Assessment:       1.  Physical exam  2.  ETOH abuse  3.  Anxiety  4.  RIH  5.  UC  Plan:       1.  Labs reviewed with pt  2.  Trial of Buspirone, encouraged to stop ETOH  3.  Pt will call back if any symptoms with [facility] for [facility] Surgery consult

## 2019-10-17 ENCOUNTER — OFFICE VISIT (OUTPATIENT)
Dept: PSYCHIATRY | Facility: CLINIC | Age: 59
End: 2019-10-17
Payer: COMMERCIAL

## 2019-10-17 DIAGNOSIS — F10.10 ETOH ABUSE: ICD-10-CM

## 2019-10-17 DIAGNOSIS — F41.1 GAD (GENERALIZED ANXIETY DISORDER): Primary | ICD-10-CM

## 2019-10-17 PROCEDURE — 90791 PR PSYCHIATRIC DIAGNOSTIC EVALUATION: ICD-10-PCS | Mod: S$GLB,,, | Performed by: SOCIAL WORKER

## 2019-10-17 PROCEDURE — 90791 PSYCH DIAGNOSTIC EVALUATION: CPT | Mod: S$GLB,,, | Performed by: SOCIAL WORKER
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
## 2019-12-10 ENCOUNTER — OFFICE VISIT (OUTPATIENT)
Dept: PSYCHIATRY | Facility: CLINIC | Age: 59
End: 2019-12-10
Payer: COMMERCIAL

## 2019-12-10 VITALS
HEART RATE: 55 BPM | HEIGHT: 66 IN | DIASTOLIC BLOOD PRESSURE: 76 MMHG | BODY MASS INDEX: 25.97 KG/M2 | SYSTOLIC BLOOD PRESSURE: 147 MMHG | WEIGHT: 161.63 LBS

## 2019-12-10 DIAGNOSIS — F41.1 GENERALIZED ANXIETY DISORDER: ICD-10-CM

## 2019-12-10 DIAGNOSIS — F41.3 OTHER MIXED ANXIETY DISORDERS: ICD-10-CM

## 2019-12-10 PROCEDURE — 3008F PR BODY MASS INDEX (BMI) DOCUMENTED: ICD-10-PCS | Mod: CPTII,S$GLB,, | Performed by: PSYCHIATRY & NEUROLOGY

## 2019-12-10 PROCEDURE — 99203 PR OFFICE/OUTPT VISIT, NEW, LEVL III, 30-44 MIN: ICD-10-PCS | Mod: S$GLB,,, | Performed by: PSYCHIATRY & NEUROLOGY

## 2019-12-10 PROCEDURE — 99999 PR PBB SHADOW E&M-EST. PATIENT-LVL III: CPT | Mod: PBBFAC,,, | Performed by: PSYCHIATRY & NEUROLOGY

## 2019-12-10 PROCEDURE — 99203 OFFICE O/P NEW LOW 30 MIN: CPT | Mod: S$GLB,,, | Performed by: PSYCHIATRY & NEUROLOGY

## 2019-12-10 PROCEDURE — 99999 PR PBB SHADOW E&M-EST. PATIENT-LVL III: ICD-10-PCS | Mod: PBBFAC,,, | Performed by: PSYCHIATRY & NEUROLOGY

## 2019-12-10 PROCEDURE — 3008F BODY MASS INDEX DOCD: CPT | Mod: CPTII,S$GLB,, | Performed by: PSYCHIATRY & NEUROLOGY

## 2019-12-10 RX ORDER — ESCITALOPRAM OXALATE 10 MG/1
TABLET ORAL
Qty: 30 TABLET | Refills: 2 | Status: SHIPPED | OUTPATIENT
Start: 2019-12-10 | End: 2020-09-25

## 2019-12-10 NOTE — PROGRESS NOTES
"Outpatient Psychiatry Initial Visit (MD/NP)    12/10/2019    Azam Bowden Jr., a 59 y.o. male, presenting for initial evaluation visit. Met with patient.    Reason for Encounter: Referral from PCP (Dr. Garner). Patient complains of anxiety.    History of Present Illness:     Patient late to appt, limiting HPI to ~20 minutes    58 y/o M w/ (recent, dx during recent inpatient psych stay, patient's first encounter with psychiatry) past psych hx MDD + anxiety Patient reports "drank too much one night and went off the deep end" with regards to recent hospital stay, says he went to hospital "rather than get a DWI". Reports currently drinking 3-4 light 12 oz beers per day, "maybe six". Says has significantly reduced, says prior to hospitalization was drinking 8-9 beers on average per night. Reports regular cannabis use as well. Says was given vicodin, benzos, sleeping aids in past for various but does not like these, prefers to avoid medications, says has leftovers from dentist appts, etc at home. Says he does not like meds in general and would prefer to avoid any medications. Describes things after moving as hectic (last month), and relates most of current anxiety and mood issues as related to this.     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 Unable to answer mood, but indicates intermittent depressed mood. Says things are "alright most of the days" but sometimes feels down. Denies SI/HI current or recent. Reports vague hx SI but "fleeting" and "would never do it" and never had a plan. Reports generally ok sleep, though provides conflicting answers, "I sleep alright", indicates anxiety affects sleep. Describes generalized worry most days of the week. Denies anhedonia, likes exercising/running. Endorses feelings of guilt regarding move from " "house (son further away from friends), brief feelings of hopelessness "it goes away quick". Adequate energy/concentration. Appetite intact. Denies PMR/PMA. Describes anxiety as worse issue, acknowledges likely exacerbation by alcohol use.     Reports medical hx stomach ulcers, feels these affect his mood. Takes nexium regularly, no other regular medications.     Reports has previously quit alcohol for 1-2 weeks at a time, says was able to with no problems. Patient pre-contemplative regarding quitting alcohol, though reports significant reductio in intake recently. Not interested in any rehab , AA programs, but agreeable to continuing therapy. Agreeable to trial SSRI after much ambivalence.    Past Psychiatric History:  Previous Medication Trials: zoloft (20 years ago, didn't appreciate any benefit, no recalled side effects) , lexapro (tried for 3 days and quit "I just don't like taking pills")  Previous Psychiatric Hospitalizations: 1x earlier this year, intoxicated got pulled over and brought to ER "go to correction or to there", "just a bad day"  Previous Suicide Attempts: denied, denies any hx of plan  History of Violence: denied  Outpatient psychiatrist: no, but seeing therapist here    Social History:  Marital Status:   Children: 5 children (4 grown) and 5 grandchildren  Source of Income: [employer] (recently sold) oil company, good income  Education: 11th grade  Special Ed: denied  Housing Status: stable, see HPI  History of phys/sexual abuse: denied  Access to gun: yes    Substance Use  Recreational Drugs: cannabis, denies others  Use of Alcohol: heavy  Tobacco Use: quit smoking ~12 years ago  Rehab History: denied  H/O Complicated Withdrawal: denied    Legal History:  Past Convictions/Incarcerations: denied, but minor charges as a teenager   Pending charges: denied    Family Psychiatric History:  Nephew - depression, substance abuse, hx chronic SI  Son - ADHD        Review Of Systems:     Pertinent items are " "noted in HPI.    Current Evaluation:     Nutritional Screening: Considering the patient's height and weight, medications, medical history and preferences, should a referral be made to the dietitian? no    Constitutional  Vitals:  Most recent vital signs, dated greater than 90 days prior to this appointment, were reviewed.    Vitals:    12/10/19 1048   BP: (!) 147/76   Pulse: (!) 55   Weight: 73.3 kg (161 lb 9.6 oz)   Height: 5' 6" (1.676 m)        General:  unremarkable, age appropriate, normal weight, casually dressed     Musculoskeletal  Muscle Strength/Tone:  not examined   Gait & Station:  non-ataxic     Psychiatric  Speech:  no latency; no press   Mood & Affect:  anxious  congruent and appropriate, full   Thought Process:  normal and logical   Associations:  intact   Thought Content:  normal, no suicidality, no homicidality, delusions, or paranoia   Insight:  limited , but acknowledges likely contribution alcohol to anxiety   Judgement: behavior is adequate to circumstances   Orientation:  grossly intact   Memory: intact for content of interview   Language: grossly intact   Attention Span & Concentration:  able to focus   Fund of Knowledge:  intact and appropriate to age and level of education       Relevant Elements of Neurological Exam: normal gait    Functioning in Relationships:  Spouse/partner: stable per patient  Peers: good  Employers: n/a, self employed    Laboratory Data  No visits with results within 1 Month(s) from this visit.   Latest known visit with results is:   Admission on 08/26/2019, Discharged on 08/26/2019   Component Date Value Ref Range Status    WBC 08/26/2019 5.15  3.90 - 12.70 K/uL Final    RBC 08/26/2019 5.10  4.60 - 6.20 M/uL Final    Hemoglobin 08/26/2019 15.0  14.0 - 18.0 g/dL Final    Hematocrit 08/26/2019 45.0  40.0 - 54.0 % Final    Mean Corpuscular Volume 08/26/2019 88  82 - 98 fL Final    Mean Corpuscular Hemoglobin 08/26/2019 29.4  27.0 - 31.0 pg Final    Mean " Corpuscular Hemoglobin Conc 08/26/2019 33.3  32.0 - 36.0 g/dL Final    RDW 08/26/2019 12.3  11.5 - 14.5 % Final    Platelets 08/26/2019 251  150 - 350 K/uL Final    MPV 08/26/2019 9.1* 9.2 - 12.9 fL Final    Immature Granulocytes 08/26/2019 0.4  0.0 - 0.5 % Final    Gran # (ANC) 08/26/2019 2.4  1.8 - 7.7 K/uL Final    Immature Grans (Abs) 08/26/2019 0.02  0.00 - 0.04 K/uL Final    Lymph # 08/26/2019 2.1  1.0 - 4.8 K/uL Final    Mono # 08/26/2019 0.4  0.3 - 1.0 K/uL Final    Eos # 08/26/2019 0.2  0.0 - 0.5 K/uL Final    Baso # 08/26/2019 0.03  0.00 - 0.20 K/uL Final    nRBC 08/26/2019 0  0 /100 WBC Final    Gran% 08/26/2019 46.9  38.0 - 73.0 % Final    Lymph% 08/26/2019 41.0  18.0 - 48.0 % Final    Mono% 08/26/2019 7.6  4.0 - 15.0 % Final    Eosinophil% 08/26/2019 3.5  0.0 - 8.0 % Final    Basophil% 08/26/2019 0.6  0.0 - 1.9 % Final    Differential Method 08/26/2019 Automated   Final    Sodium 08/26/2019 140  136 - 145 mmol/L Final    Potassium 08/26/2019 3.8  3.5 - 5.1 mmol/L Final    Chloride 08/26/2019 108  95 - 110 mmol/L Final    CO2 08/26/2019 18* 23 - 29 mmol/L Final    Glucose 08/26/2019 89  70 - 110 mg/dL Final    BUN, Bld 08/26/2019 13  8 - 23 mg/dL Final    Creatinine 08/26/2019 0.9  0.5 - 1.4 mg/dL Final    Calcium 08/26/2019 9.4  8.7 - 10.5 mg/dL Final    Total Protein 08/26/2019 7.1  6.0 - 8.4 g/dL Final    Albumin 08/26/2019 3.9  3.5 - 5.2 g/dL Final    Total Bilirubin 08/26/2019 0.3  0.1 - 1.0 mg/dL Final    Alkaline Phosphatase 08/26/2019 43* 55 - 135 U/L Final    AST 08/26/2019 22  10 - 40 U/L Final    ALT 08/26/2019 19  10 - 44 U/L Final    Anion Gap 08/26/2019 14  8 - 16 mmol/L Final    eGFR if African American 08/26/2019 >60.0  >60 mL/min/1.73 m^2 Final    eGFR if non African American 08/26/2019 >60.0  >60 mL/min/1.73 m^2 Final    TSH 08/26/2019 0.996  0.400 - 4.000 uIU/mL Final    Specimen UA 08/26/2019 Urine, Clean Catch   Final    Color, UA 08/26/2019  Yellow  Yellow, Straw, Demi Final    Appearance, UA 08/26/2019 Clear  Clear Final    pH, UA 08/26/2019 5.0  5.0 - 8.0 Final    Specific Gravity, UA 08/26/2019 1.010  1.005 - 1.030 Final    Protein, UA 08/26/2019 Negative  Negative Final    Glucose, UA 08/26/2019 Negative  Negative Final    Ketones, UA 08/26/2019 Negative  Negative Final    Bilirubin (UA) 08/26/2019 Negative  Negative Final    Occult Blood UA 08/26/2019 Negative  Negative Final    Nitrite, UA 08/26/2019 Negative  Negative Final    Leukocytes, UA 08/26/2019 Negative  Negative Final    Benzodiazepines 08/26/2019 Negative   Final    Methadone metabolites 08/26/2019 Negative   Final    Cocaine (Metab.) 08/26/2019 Negative   Final    Opiate Scrn, Ur 08/26/2019 Negative   Final    Barbiturate Screen, Ur 08/26/2019 Negative   Final    Amphetamine Screen, Ur 08/26/2019 Negative   Final    THC 08/26/2019 Presumptive Positive   Final    Phencyclidine 08/26/2019 Negative   Final    Creatinine, Random Ur 08/26/2019 66.0  23.0 - 375.0 mg/dL Final    Toxicology Information 08/26/2019 SEE COMMENT   Final    Alcohol, Medical, Serum 08/26/2019 37* <10 mg/dL Final    Acetaminophen (Tylenol), Serum 08/26/2019 <3.0* 10.0 - 20.0 ug/mL Final         Medications  Outpatient Encounter Medications as of 12/10/2019   Medication Sig Dispense Refill    busPIRone (BUSPAR) 7.5 MG tablet Take 1 tablet (7.5 mg total) by mouth 3 (three) times daily. 90 tablet 11    dorzolamide-timolol 2-0.5% (COSOPT) 22.3-6.8 mg/mL ophthalmic solution Place 1 drop into both eyes 2 (two) times daily. 10 mL 11    esomeprazole (NEXIUM) 20 MG capsule Take 20 mg by mouth before breakfast.      varicella-zoster gE-AS01B, PF, (SHINGRIX, PF,) 50 mcg/0.5 mL injection Inject into the muscle. 0.5 mL 1    vit C/vit E/lutein/min/omega-3 (OCUVITE ORAL) Take 1 tablet by mouth once daily.        Facility-Administered Encounter Medications as of 12/10/2019   Medication Dose Route  Frequency Provider Last Rate Last Dose    cyclopentolate 1% ophthalmic solution 1 drop  1 drop [facility] Procedure Walker Berrios MD   1 drop at 11/28/18 0725    homatropine 5 % ophthalmic solution 1 drop  1 drop [facility] Procedure Walker Berrios MD   1 drop at 11/28/18 0725    moxifloxacin 0.5 % ophthalmic solution 1 drop  1 drop [facility] Procedure Walker Berrios MD   1 drop at 11/28/18 0725    phenylephrine HCL 2.5% ophthalmic solution 1 drop  1 drop [facility] Procedure Walker Berrios MD   1 drop at 11/28/18 0725    prednisoLONE acetate 1 % ophthalmic suspension 1 drop  1 drop [facility] Procedure Walker Berrios MD   1 drop at 11/28/18 0725    tetracaine HCl (PF) 0.5 % Drop 1 drop  1 drop [facility] Procedure Walker Berrios MD   1 drop at 11/28/18 0709    tropicamide 1% ophthalmic solution 1 drop  1 drop [facility] Procedure Walker Berrios MD   1 drop at 11/28/18 0725           Assessment - Diagnosis - Goals:     Impression: 58 y/o  M presents for eval anxiety in setting of chronic alcohol abuse, sporadic cannabis use. Recent inpatient stay 2/2 SI while intoxicated, patient seems to minimize during interview. Ambivalent answers regarding possible depression, but reports widespread anxiety most days of the week about a variety of stressors. Pre-contemplative to quitting substances though acknowledges likely detrimental effects to mental health. Agreeable to continuing therapy, very ambivalent regarding medications but reports willingness to trial lexapro for anxiety, in hopes this will reduce self medication with alcohol.    dx  - SIMD (vs ALBIN)  - alcohol abuse disorder, unspecified    Strengths and Liabilities: Strength: Patient accepts guidance/feedback, Strength: Patient is expressive/articulate., Strength: Patient is physically healthy., Liability: Patient lacks coping skills.    Treatment Goals:  Specify outcomes written in  observable, behavioral terms:   Move past pre-contemplative stage re alcohol, reduce anxiety    Treatment Plan/Recommendations:   · Start lexapro 5 mg PO daily x3 days, then 10 mg PO daily afterwards for anxiety. Hopeful this will reduce self medicating behaviors (alcohol)  · Start folbic  · Continue regular therapy  · Recommended BMU as ultimate recommendation, also recommended regular addiction counseling and AA but patient not interested in these. Agreeable only to continuing therapy, and after much discussion to trialing lexapro  · TSH, CBC and CMP from 8/2019 unremarkable     Discussed with patient potential side effects of SSRI's including but not limited to sexual dysfunction, GI side effects, headaches, dizziness possible weight gain, sleep effects and serotonin syndrome with described symptoms and to present to ER should they arise. Patient voiced understanding. Educated patient on importance of daily adherence, and that SSRIs can take up to 4-6 weeks for full effect. Patient voiced understanding.    · Medication Management: The risks and benefits of medication were discussed with the patient.   · Labs: reviewed most recent  · The treatment plan and follow up plan were reviewed with the patient.  · Discussed with patient informed consent, risks vs. benefits, alternative treatments, side effect profile and the inherent unpredictability of individual responses to these treatments. The patient expresses understanding of the above and displays the capacity to agree with this current plan and had no other questions.  · Encouraged Patient to keep future appointments.   · Take medications as prescribed and abstain from substance abuse.   · In the event of an emergency patient was advised to go to the emergency room.      Return to Clinic: 1 month    Counseling time: 30 minutes   Total time: 60 minutes  Consulting clinician was informed of the encounter and consult note.

## 2019-12-15 ENCOUNTER — OFFICE VISIT (OUTPATIENT)
Dept: URGENT CARE | Facility: CLINIC | Age: 59
End: 2019-12-15
Payer: COMMERCIAL

## 2019-12-15 VITALS
BODY MASS INDEX: 25.88 KG/M2 | HEART RATE: 60 BPM | SYSTOLIC BLOOD PRESSURE: 131 MMHG | WEIGHT: 161 LBS | DIASTOLIC BLOOD PRESSURE: 79 MMHG | RESPIRATION RATE: 18 BRPM | HEIGHT: 66 IN | OXYGEN SATURATION: 98 % | TEMPERATURE: 98 F
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
## 2019-12-15 DIAGNOSIS — S20.221A CONTUSION OF RIGHT SIDE OF BACK, INITIAL ENCOUNTER: Primary | ICD-10-CM

## 2019-12-15 DIAGNOSIS — M54.50 LOW BACK PAIN, NON-SPECIFIC: ICD-10-CM

## 2019-12-15 PROCEDURE — 72100 X-RAY EXAM L-S SPINE 2/3 VWS: CPT | Mod: S$GLB,,, | Performed by: RADIOLOGY

## 2019-12-15 PROCEDURE — 73502 XR HIP 2 VIEW RIGHT: ICD-10-PCS | Mod: RT,S$GLB,, | Performed by: RADIOLOGY

## 2019-12-15 PROCEDURE — 99214 PR OFFICE/OUTPT VISIT, EST, LEVL IV, 30-39 MIN: ICD-10-PCS | Mod: S$GLB,,, | Performed by: PHYSICIAN ASSISTANT

## 2019-12-15 PROCEDURE — 72100 XR LUMBAR SPINE 2 OR 3 VIEWS: ICD-10-PCS | Mod: S$GLB,,, | Performed by: RADIOLOGY

## 2019-12-15 PROCEDURE — 73502 X-RAY EXAM HIP UNI 2-3 VIEWS: CPT | Mod: RT,S$GLB,, | Performed by: RADIOLOGY

## 2019-12-15 PROCEDURE — 99214 OFFICE O/P EST MOD 30 MIN: CPT | Mod: S$GLB,,, | Performed by: PHYSICIAN ASSISTANT

## 2019-12-15 NOTE — PATIENT INSTRUCTIONS
Follow up with primary care in 1 week  Rest, ice, ibuprofen or tylenol over the counter for pain and inflammation    If any pain worsens, symptoms worsen or change or become concerning, please be evaluated in the emergency department    Please return here or go to the Emergency Department for any concerns or worsening of condition.  If you were prescribed antibiotics, please take them to completion.  If you were prescribed a narcotic medication, do not drive or operate heavy equipment or machinery while taking these medications.  Please follow up with your primary care doctor or specialist as needed.    If you  smoke, please stop smoking.          Back Contusion     You have a contusion to your back. A contusion is also called a bruise. There is swelling and some bleeding under the skin. The skin may be purplish. You may have muscle aching and stiffness in the area of the bruise. There are no broken bones.  Contusions heal on their own, without further treatment. However, pain and skin discoloration may take weeks to months to go away.   Home care  · Rest. Avoid heavy lifting, strenuous exertion, or any activity that causes pain.  · Ice the area to reduce pain and swelling. Put ice cubes in a plastic bag or use a cold pack. (Wrap the cold source in a thin towel. Do not place it directly on your skin.) Ice the injured area for 20 minutes every 1-2 hours the first day. Continue with ice packs 3-4 times a day for the next two days, then as needed for the relief of pain and swelling.  · Take any prescribed pain medication. If none was prescribed, take acetaminophen, ibuprofen, or naproxen to control pain.  Follow-up care  Follow up with your healthcare provider, or as directed. Call if you are not better in 1-2 weeks.  When to seek medical advice  Call your healthcare provider for any of the following:  · New or worsening pain  · Increased swelling around the bruise  · Pain spreads to one or both legs  · Weakness or  numbness in one or both legs   · Loss of bowel or bladder control  · Numbness in the groin or genital area  · Fever of 100.4°F (38ºC) or higher, or as directed by your healthcare provider  Date Last Reviewed: 6/26/2015  © 6795-4102 [employer]. [address] [place] [id number]. All rights reserved. This information is not intended as a substitute for professional medical care. Always follow your healthcare professional's instructions.

## 2019-12-15 NOTE — PROGRESS NOTES
"Subjective:       Patient ID: Azam Bowden Jr. is a 59 y.o. male.    Vitals:  height is 5' 6" (1.676 m) and weight is 73 kg (161 lb). His temperature is 98.4 °F (36.9 °C). His blood pressure is 131/79 and his pulse is 60. His respiration is 18 and oxygen saturation is 98%.     Chief Complaint: Back Pain    This is a 59-year-old male who presents complaining of a fall 1 and half weeks ago.  States he was on 1 step, walking down, slipped and fell on to his right lower back.  Initially sustained a bruise to right lower back, states the bruise has improved, however he has continued with a swelling to his right lower back.  Pain to right low back over area of swelling, worse with movement and palpation.  Complaining of a burning pain when pushing on the area.  Denies any fevers, loss of bowel or bladder continence, pain radiating down either leg , numbness or tingling, saddle anesthesia does, nausea, vomiting, abdominal pain, any other bony pain.      Back Pain   This is a new problem. The current episode started 1 to 4 weeks ago. The problem occurs constantly. The problem is unchanged. The quality of the pain is described as aching, stabbing and burning. The pain does not radiate. The pain is worse during the night. The symptoms are aggravated by lying down. Stiffness is present all day. Pertinent negatives include no chest pain, dysuria, fever or headaches. He has tried nothing for the symptoms.       Constitution: Negative for chills, fatigue and fever.   HENT: Negative for congestion and sore throat.    Neck: Negative for painful lymph nodes.   Cardiovascular: Negative for chest pain and leg swelling.   Eyes: Negative for double vision and blurred vision.   Respiratory: Negative for cough and shortness of breath.    Gastrointestinal: Negative for nausea, vomiting and diarrhea.   Genitourinary: Negative for dysuria, frequency and urgency.   Musculoskeletal: Positive for back pain. Negative for joint pain, joint " swelling, muscle cramps and muscle ache.   Skin: Negative for color change, pale and rash.   Allergic/Immunologic: Negative for seasonal allergies.   Neurological: Negative for dizziness, history of vertigo, light-headedness, passing out and headaches.   Hematologic/Lymphatic: Negative for swollen lymph nodes, easy bruising/bleeding and history of blood clots. Does not bruise/bleed easily.   Psychiatric/Behavioral: Negative for nervous/anxious, sleep disturbance and depression. The patient is not nervous/anxious.        Objective:      Physical Exam   Constitutional: He is oriented to person, place, and time. Vital signs are normal. He appears well-developed and well-nourished. He is active and cooperative. No distress.   HENT:   Head: Normocephalic and atraumatic.   Nose: Nose normal.   Mouth/Throat: Oropharynx is clear and moist and mucous membranes are normal.   Eyes: Conjunctivae and lids are normal.   Neck: Trachea normal, normal range of motion, full passive range of motion without pain and phonation normal. Neck supple.   Cardiovascular: Normal rate, regular rhythm, normal heart sounds, intact distal pulses and normal pulses.   Pulmonary/Chest: Effort normal and breath sounds normal.   Abdominal: Soft. Normal appearance and bowel sounds are normal. He exhibits no abdominal bruit, no pulsatile midline mass and no mass.   There is no abdominal tenderness to light or deep palpation in any quadrant. No acute abdomen appreciated. Good bowel sounds.       Musculoskeletal: He exhibits no edema or deformity.        Right hip: Normal. He exhibits normal range of motion, normal strength, no tenderness and no bony tenderness.        Left hip: Normal. He exhibits normal range of motion, normal strength, no tenderness and no bony tenderness.        Cervical back: Normal. He exhibits normal range of motion, no tenderness, no bony tenderness, no swelling, no edema and no deformity.        Thoracic back: Normal. He exhibits  normal range of motion, no tenderness, no bony tenderness, no swelling, no edema and no deformity.        Lumbar back: He exhibits tenderness.        Back:    Full ROM in back  Ambulating without difficulty  No pain to UE, LE bilat     Neurological: He is alert and oriented to person, place, and time. He has normal strength and normal reflexes. No sensory deficit.   Skin: Skin is warm, dry, intact and not diaphoretic.   Psychiatric: He has a normal mood and affect. His speech is normal and behavior is normal. Judgment and thought content normal. Cognition and memory are normal.   Nursing note and vitals reviewed.                      X-ray Hip 2 Or 3 Views Right    Result Date: 12/15/2019  EXAMINATION: XR HIP 2 VIEW RIGHT CLINICAL HISTORY: Low back pain TECHNIQUE: AP view of the pelvis and frog leg lateral view of the right hip were performed. COMPARISON: None FINDINGS: Two views. The bilateral sacroiliac joints are intact.  The pubic symphysis is intact.  Degenerative changes are noted of the bilateral femoroacetabular joints.     1. No acute displaced fracture or dislocation of the right hip. Electronically signed by: Nils James MD Date:    12/15/2019 Time:    14:23    X-ray Lumbar Spine 2 Or 3 Views    Result Date: 12/15/2019  EXAMINATION: XR LUMBAR SPINE 2 OR 3 VIEWS CLINICAL HISTORY: Low back pain, minor trauma;fell 1 week ago, slipped and tripped, hit right lower back on stair,low right back pain with swelling, contusion, TTP over right lower back;Low back pain TECHNIQUE: AP, lateral and spot images were performed of the lumbar spine. COMPARISON: None FINDINGS: Alignment: Alignment is maintained. Vertebrae: Vertebral body heights are maintained.  No suspicious appearing lytic or blastic lesions. Discs and facets: Disc heights are maintained.  Facet joints are unremarkable. Miscellaneous: Marginal osteophytic spurs lumbar spine.     As above. Electronically signed by: Anatoliy Rod MD Date:    12/15/2019  Time:    14:21        Assessment:       1. Contusion of right side of back, initial encounter    2. Low back pain, non-specific    3. Fall (on) (from) other stairs and steps, initial encounter        Plan:       Contusion, negative XRAYS  Recommend Rest, ice, ibuprofen or tylenol  F/u with primary care in 1 week to assess improvement or need for further eval  ED precautions given if anything worsens at all    Contusion of right side of back, initial encounter    Low back pain, non-specific  -     X-Ray Lumbar Spine 2 Or 3 Views; Future; Expected date: 12/15/2019  -     X-Ray Hip 2 or 3 views Right; Future; Expected date: 12/15/2019    Fall (on) (from) other stairs and steps, initial encounter  -     X-Ray Lumbar Spine 2 Or 3 Views; Future; Expected date: 12/15/2019  -     X-Ray Hip 2 or 3 views Right; Future; Expected date: 12/15/2019         Patient Instructions   Follow up with primary care in 1 week  Rest, ice, ibuprofen or tylenol over the counter for pain and inflammation    If any pain worsens, symptoms worsen or change or become concerning, please be evaluated in the emergency department    Please return here or go to the Emergency Department for any concerns or worsening of condition.  If you were prescribed antibiotics, please take them to completion.  If you were prescribed a narcotic medication, do not drive or operate heavy equipment or machinery while taking these medications.  Please follow up with your primary care doctor or specialist as needed.    If you  smoke, please stop smoking.          Back Contusion     You have a contusion to your back. A contusion is also called a bruise. There is swelling and some bleeding under the skin. The skin may be purplish. You may have muscle aching and stiffness in the area of the bruise. There are no broken bones.  Contusions heal on their own, without further treatment. However, pain and skin discoloration may take weeks to months to go away.   Home  care  · Rest. Avoid heavy lifting, strenuous exertion, or any activity that causes pain.  · Ice the area to reduce pain and swelling. Put ice cubes in a plastic bag or use a cold pack. (Wrap the cold source in a thin towel. Do not place it directly on your skin.) Ice the injured area for 20 minutes every 1-2 hours the first day. Continue with ice packs 3-4 times a day for the next two days, then as needed for the relief of pain and swelling.  · Take any prescribed pain medication. If none was prescribed, take acetaminophen, ibuprofen, or naproxen to control pain.  Follow-up care  Follow up with your healthcare provider, or as directed. Call if you are not better in 1-2 weeks.  When to seek medical advice  Call your healthcare provider for any of the following:  · New or worsening pain  · Increased swelling around the bruise  · Pain spreads to one or both legs  · Weakness or numbness in one or both legs   · Loss of bowel or bladder control  · Numbness in the groin or genital area  · Fever of 100.4°F (38ºC) or higher, or as directed by your healthcare provider  Date Last Reviewed: 6/26/2015  © 4584-2520 [employer]. [address] [place] [address], [place] [address]. All rights reserved. This information is not intended as a substitute for professional medical care. Always follow your healthcare professional's instructions.

## 2019-12-26 ENCOUNTER — IMMUNIZATION (OUTPATIENT)
Dept: PHARMACY | Facility: CLINIC | Age: 59
End: 2019-12-26
Payer: COMMERCIAL

## 2020-01-08 NOTE — PROGRESS NOTES
Staff Note:     Case discussed.  I agree with Resident's findings and treatment plan.  Pt voiced lack of interest in quitting alcohol.

## 2020-09-25 ENCOUNTER — OFFICE VISIT (OUTPATIENT)
Dept: INTERNAL MEDICINE | Facility: CLINIC | Age: 60
End: 2020-09-25
Payer: COMMERCIAL

## 2020-09-25 VITALS
WEIGHT: 166.44 LBS | HEIGHT: 66 IN | BODY MASS INDEX: 26.75 KG/M2 | DIASTOLIC BLOOD PRESSURE: 62 MMHG | SYSTOLIC BLOOD PRESSURE: 140 MMHG | HEART RATE: 60 BPM

## 2020-09-25 DIAGNOSIS — M25.511 RIGHT SHOULDER PAIN, UNSPECIFIED CHRONICITY: ICD-10-CM

## 2020-09-25 DIAGNOSIS — Z87.891 HX OF TOBACCO USE, PRESENTING HAZARDS TO HEALTH: ICD-10-CM

## 2020-09-25 DIAGNOSIS — R03.0 ELEVATED BLOOD PRESSURE READING: ICD-10-CM

## 2020-09-25 DIAGNOSIS — H40.003 GLAUCOMA SUSPECT OF BOTH EYES: ICD-10-CM

## 2020-09-25 DIAGNOSIS — F41.3 OTHER MIXED ANXIETY DISORDERS: ICD-10-CM

## 2020-09-25 DIAGNOSIS — K51.90 ULCERATIVE COLITIS WITHOUT COMPLICATIONS, UNSPECIFIED LOCATION: ICD-10-CM

## 2020-09-25 DIAGNOSIS — Z76.89 ESTABLISHING CARE WITH NEW DOCTOR, ENCOUNTER FOR: Primary | ICD-10-CM

## 2020-09-25 DIAGNOSIS — K21.9 GASTROESOPHAGEAL REFLUX DISEASE, ESOPHAGITIS PRESENCE NOT SPECIFIED: ICD-10-CM

## 2020-09-25 PROCEDURE — 99999 PR PBB SHADOW E&M-EST. PATIENT-LVL III: CPT | Mod: PBBFAC,,, | Performed by: STUDENT IN AN ORGANIZED HEALTH CARE EDUCATION/TRAINING PROGRAM

## 2020-09-25 PROCEDURE — 3008F BODY MASS INDEX DOCD: CPT | Mod: CPTII,S$GLB,, | Performed by: STUDENT IN AN ORGANIZED HEALTH CARE EDUCATION/TRAINING PROGRAM

## 2020-09-25 PROCEDURE — 3008F PR BODY MASS INDEX (BMI) DOCUMENTED: ICD-10-PCS | Mod: CPTII,S$GLB,, | Performed by: STUDENT IN AN ORGANIZED HEALTH CARE EDUCATION/TRAINING PROGRAM

## 2020-09-25 PROCEDURE — 99203 OFFICE O/P NEW LOW 30 MIN: CPT | Mod: S$GLB,,, | Performed by: STUDENT IN AN ORGANIZED HEALTH CARE EDUCATION/TRAINING PROGRAM

## 2020-09-25 PROCEDURE — 99999 PR PBB SHADOW E&M-EST. PATIENT-LVL III: ICD-10-PCS | Mod: PBBFAC,,, | Performed by: STUDENT IN AN ORGANIZED HEALTH CARE EDUCATION/TRAINING PROGRAM

## 2020-09-25 PROCEDURE — 99203 PR OFFICE/OUTPT VISIT, NEW, LEVL III, 30-44 MIN: ICD-10-PCS | Mod: S$GLB,,, | Performed by: STUDENT IN AN ORGANIZED HEALTH CARE EDUCATION/TRAINING PROGRAM

## 2020-09-25 NOTE — PROGRESS NOTES
"Internal Medicine Clinic Note  2020    Subjective   Patient Name: Azam Bowden Jr.  : 1960    Chief Complaint: No chief complaint on file.      History of Present Illness:  Mr. Azam Bowden Jr. is a 60 y.o. male presenting to [facility] care. He last saw PCP Sep 2019. Feeling well today. 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 History includes UC (follows with outside GI), GERD, right inguinal hernia, b/l open-angle glaucoma (follows with ophtho), SIMD vs ALBIN, EtOH use, former smoker, and occasional cannabis use. He used to run for exercise before COVID, and now wanting to resume as he feels deconditioned. Does yoga at home sometimes with wife and daughter. He had significant SIMD vs ALBIN in setting of excessive EtOH use in 2019 that resulted and hospitalization and followed with psychiatry at that time. Trial then on lexapro and buspirone and is not interested in medications at this time. Uses cannabis occasionally when anxious, but reports doing well overall. Continues to cut down on alcohol use, currently 2 drinks 4 times per week. Admits to eating a poor diet with teenage son.    Review of Systems   Constitutional: Negative for chills and fever.   HENT: Negative for sore throat.    Respiratory: Negative for cough and wheezing.         Mild MCBRIDE while working on boat since has no longer been exercising/running   Cardiovascular: Negative for chest pain, palpitations and leg swelling.   Gastrointestinal: Negative for abdominal pain, constipation, diarrhea, heartburn, nausea and vomiting.   Genitourinary: Negative for dysuria.   Musculoskeletal: Negative for myalgias.        Right shoulder pain intermittently   Neurological: Negative for dizziness and headaches.   Endo/Heme/Allergies: Bruises/bleeds easily. "   Psychiatric/Behavioral:        Intermittent anxiety improved with cannabis or yoga; Cutting down on alcohol use       PAST HISTORY:     Past Medical History:   Diagnosis Date    Anxiety     GERD (gastroesophageal reflux disease)     Glaucoma     History of alcohol abuse     Ulcerative colitis     Ulcerative colitis        Past Surgical History:   Procedure Laterality Date    anal fistula      CATARACT EXTRACTION W/  INTRAOCULAR LENS IMPLANT Bilateral n/a    done outside [facility]    CATARACT EXTRACTION, BILATERAL      COLONOSCOPY  2016    ulcerative colitis    TONSILLECTOMY      VITRECTOMY BY PARS PLANA APPROACH Left 11/28/2018    Procedure: VITRECTOMY, PARS PLANA APPROACH;  Surgeon: DIANDRA Rushing MD;  Location: [facility] OR [address] [place] [address];  Service: Ophthalmology;  Laterality: Left;  20 min    VITRECTOMY BY PARS PLANA APPROACH Right 12/12/2018    Procedure: VITRECTOMY, PARS PLANA APPROACH;  Surgeon: DIANDRA Rushing MD;  Location: [facility] OR [address] [place] [address];  Service: Ophthalmology;  Laterality: Right;  20min       Family History   Problem Relation Age of Onset    Cataracts Mother     Glaucoma Mother     Macular degeneration Mother     Diabetes Father     Cancer Father 75        Colon    Cataracts Father     Glaucoma Father     Cancer Sister     Cancer Brother         prostate cancer    Amblyopia Neg Hx     Blindness Neg Hx     Hypertension Neg Hx     Retinal detachment Neg Hx     Strabismus Neg Hx        Social History     Socioeconomic History    Marital status:      Spouse name: Not on file    Number of children: 5    Years of education: Not on file    Highest education level: Not on file   Occupational History    Occupation: Owner [employer]   Social Needs    Financial resource strain: Not on file    Food insecurity     Worry: Not on file     Inability: Not on file    Transportation needs     Medical: Not on file     Non-medical: Not on file   Tobacco Use    Smoking  status: Former Smoker     Packs/day: 1.00     Years: 20.00     Pack years: 20.00     Types: Cigarettes     Quit date: 2/10/2009     Years since quittin.6    Smokeless tobacco: Former User   Substance and Sexual Activity    Alcohol use: Yes     Alcohol/week: 8.0 standard drinks     Types: 8 Cans of beer per week     Comment: approximately 2 drinks 4 times per week    Drug use: Yes     Comment: Cannabis occasionally    Sexual activity: Yes     Partners: Female   Lifestyle    Physical activity     Days per week: Not on file     Minutes per session: Not on file    Stress: Not on file   Relationships    Social connections     Talks on phone: Not on file     Gets together: Not on file     Attends Confucianism service: Not on file     Active member of club or organization: Not on file     Attends meetings of clubs or organizations: Not on file     Relationship status: Not on file   Other Topics Concern    Not on file   Social History Narrative    Not on file       MEDICATIONS & ALLERGIES:       Current Outpatient Medications:     dorzolamide-timolol 2-0.5% (COSOPT) 22.3-6.8 mg/mL ophthalmic solution, Place 1 drop into both eyes 2 (two) times daily. (Patient not taking: Reported on 12/15/2019), Disp: 10 mL, Rfl: 11    esomeprazole (NEXIUM) 20 MG capsule, Take 20 mg by mouth before breakfast., Disp: , Rfl:   No current facility-administered medications for this visit.     Facility-Administered Medications Ordered in Other Visits:     cyclopentolate 1% ophthalmic solution 1 drop, 1 drop, Left Eye, On Call Procedure, Walker Berrios MD, 1 drop at 18 07    homatropine 5 % ophthalmic solution 1 drop, 1 drop, Left Eye, On Call Procedure, Walker Berrios MD, 1 drop at 18 0725    moxifloxacin 0.5 % ophthalmic solution 1 drop, 1 drop, Left Eye, On Call Procedure, Walker Berrios MD, 1 drop at 18 0725    phenylephrine HCL 2.5% ophthalmic solution 1 drop, 1 drop, Left Eye, On Call Procedure,  "Walker Berrios MD, 1 drop at 11/28/18 0725    prednisoLONE acetate 1 % ophthalmic suspension 1 drop, 1 drop, Left Eye, On Call Procedure, Walker Berrios MD, 1 drop at 11/28/18 0725    tetracaine HCl (PF) 0.5 % Drop 1 drop, 1 drop, Left Eye, On Call Procedure, Walker Berrios MD, 1 drop at 11/28/18 0709    tropicamide 1% ophthalmic solution 1 drop, 1 drop, Left Eye, On Call Procedure, Walker Berrios MD, 1 drop at 11/28/18 0725    Review of patient's allergies indicates:  No Known Allergies    OBJECTIVE:     Vital Signs:  Vitals:    09/25/20 1400   BP: (!) 140/62   Pulse: 60   Weight: 75.5 kg (166 lb 7.2 oz)   Height: 5' 6" (1.676 m)       Body mass index is 26.87 kg/m².     Physical Exam  Constitutional:       Appearance: Normal appearance.   HENT:      Head: Normocephalic and atraumatic.      Mouth/Throat:      Mouth: Mucous membranes are moist.      Pharynx: Oropharynx is clear.   Eyes:      General: No scleral icterus.     Extraocular Movements: Extraocular movements intact.      Pupils: Pupils are equal, round, and reactive to light.   Neck:      Musculoskeletal: Neck supple.   Cardiovascular:      Rate and Rhythm: Normal rate and regular rhythm.      Pulses: Normal pulses.      Heart sounds: Normal heart sounds.   Pulmonary:      Effort: Pulmonary effort is normal. No respiratory distress.      Breath sounds: Normal breath sounds.   Abdominal:      General: Abdomen is flat. Bowel sounds are normal.      Palpations: Abdomen is soft.   Musculoskeletal:         General: No swelling.   Skin:     General: Skin is warm and dry.      Capillary Refill: Capillary refill takes less than 2 seconds.      Comments: 1 small bruise on LUE   Neurological:      General: No focal deficit present.      Mental Status: He is alert and oriented to person, place, and time. Mental status is at baseline.   Psychiatric:         Mood and Affect: Mood normal.         Behavior: Behavior normal.         Thought Content: Thought " "content normal.         Judgment: Judgment normal.         Laboratory  Lab Results   Component Value Date    WBC 5.15 08/26/2019    HGB 15.0 08/26/2019    HCT 45.0 08/26/2019    MCV 88 08/26/2019     08/26/2019     BMP  Lab Results   Component Value Date     08/26/2019    K 3.8 08/26/2019     08/26/2019    CO2 18 (L) 08/26/2019    BUN 13 08/26/2019    CREATININE 0.9 08/26/2019    CALCIUM 9.4 08/26/2019    ANIONGAP 14 08/26/2019    ESTGFRAFRICA >60.0 08/26/2019    EGFRNONAA >60.0 08/26/2019     Lab Results   Component Value Date    INR 0.9 09/01/2006    INR 0.9 08/31/2006     Lab Results   Component Value Date    HGBA1C 5.2 09/01/2006     No results for input(s): POCTGLUCOSE in the last 72 hours.    Health Maintenance Due   Topic Date Due    HIV Screening  07/10/1975    Influenza Vaccine (1) 08/01/2020       ASSESSMENT & PLAN:   Azam was seen today in clinic to establish care    Establishing care with new doctor, encounter for  - Last saw PCP Sep 2019. Feeling well today. 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  - History includes UC, GERD, right inguinal hernia, b/l open-angle glaucoma, SIMD vs ALBIN, EtOH use, occasional cannabis use  - Labs in 2019 unremarkable with exception of    - LIPID PANEL; Future; Expected date: 09/25/2020  - Comprehensive metabolic panel; Future; Expected date: 09/25/2020  - CBC auto differential; Future; Expected date: 09/25/2020  - Protime-INR; Future; Expected date: 09/25/2020  - APTT; Future; Expected date: 09/25/2020  - TSH; Future; Expected date: 09/25/2020    Glaucoma suspect of both eyes  - Follows with ophtho  - Continue Cosopt eye drops    Ulcerative colitis without complications, unspecified location  Gastroesophageal reflux disease, esophagitis presence not specified  - Reports mild intermittent bloody stool associated " with worsening anxiety or alcohol use  - No significant blood or clots in BM lately  - Continue Nexium 20 mg daily  - Follows with outside GI    Other mixed anxiety disorders  - History of SIDM vs ALBIN and hospitalization in 2019. Followed with psychiatry at that time  - No longer on lexapro or buspirone and is not interested in medications at this time  - Uses cannabis occasionally when anxious, but reports doing well overall  - Continues to cut down on alcohol use, currently 2 drinks 4 times per week    Hx of tobacco use, presenting hazards to health  - Quit smoking 12 years ago after 20 pack year history  - Consider AAA screening with abdominal US when 63 y.o.  - CT with coronary calcium score 0 and 0.3 cm pulmonary micronodule in 2018    Elevated blood pressure reading  - 140/62 today  - Discussed lifestyle modification, including resuming running, mindful diet without excess sodium, and weight loss    Shoulder pain, right  - Describes right shoulder pain onset when throwing football and notices when shoulder overhead  - Suspect muscular etiology  - Discussed PT. Patient desires to stretch and yoga at home and will f/u if persists or worsening for further consideration of PT or investigating further if no improvement for possible XR or ortho referral    Preventative Health:   Up to date on immunizations including shingles and tetanus  Due for screening CSC in 2023  Completed HCV testing  Declines HIV testing at this time  Will be obtaining flu shot with wife soon    RTC in 1 year, or sooner as needed.    Discussed with Dr. Garcia  - staff attestation to follow    Ariane Vidal DO  Internal Medicine, PGY-1  [facility]  [address] [place] [address] [id number] [phone]

## 2020-09-25 NOTE — PATIENT INSTRUCTIONS
- Please complete fasting labs at your earliest convenience  - Follow up in 1 year or sooner as needed  - Continue shoulder stretches and if worsening follow up for consideration of physical therapy and further investigation  - Obtain flu shot this season

## 2020-09-30 ENCOUNTER — LAB VISIT (OUTPATIENT)
Dept: LAB | Facility: HOSPITAL | Age: 60
End: 2020-09-30
Attending: STUDENT IN AN ORGANIZED HEALTH CARE EDUCATION/TRAINING PROGRAM
Payer: COMMERCIAL

## 2020-09-30 DIAGNOSIS — Z76.89 ESTABLISHING CARE WITH NEW DOCTOR, ENCOUNTER FOR: ICD-10-CM

## 2020-09-30 LAB
ALBUMIN SERPL BCP-MCNC: 3.9 G/DL (ref 3.5–5.2)
ALP SERPL-CCNC: 43 U/L (ref 55–135)
ALT SERPL W/O P-5'-P-CCNC: 19 U/L (ref 10–44)
ANION GAP SERPL CALC-SCNC: 9 MMOL/L (ref 8–16)
APTT BLDCRRT: 27.9 SEC (ref 21–32)
AST SERPL-CCNC: 21 U/L (ref 10–40)
BASOPHILS # BLD AUTO: 0.04 K/UL (ref 0–0.2)
BASOPHILS NFR BLD: 0.8 % (ref 0–1.9)
BILIRUB SERPL-MCNC: 0.6 MG/DL (ref 0.1–1)
BUN SERPL-MCNC: 11 MG/DL (ref 8–23)
CALCIUM SERPL-MCNC: 9.1 MG/DL (ref 8.7–10.5)
CHLORIDE SERPL-SCNC: 106 MMOL/L (ref 95–110)
CHOLEST SERPL-MCNC: 241 MG/DL (ref 120–199)
CHOLEST/HDLC SERPL: 4.1 {RATIO} (ref 2–5)
CO2 SERPL-SCNC: 26 MMOL/L (ref 23–29)
CREAT SERPL-MCNC: 0.9 MG/DL (ref 0.5–1.4)
DIFFERENTIAL METHOD: ABNORMAL
EOSINOPHIL # BLD AUTO: 0.3 K/UL (ref 0–0.5)
EOSINOPHIL NFR BLD: 4.9 % (ref 0–8)
ERYTHROCYTE [DISTWIDTH] IN BLOOD BY AUTOMATED COUNT: 12.8 % (ref 11.5–14.5)
EST. GFR  (AFRICAN AMERICAN): >60 ML/MIN/1.73 M^2
EST. GFR  (NON AFRICAN AMERICAN): >60 ML/MIN/1.73 M^2
GLUCOSE SERPL-MCNC: 94 MG/DL (ref 70–110)
HCT VFR BLD AUTO: 45.2 % (ref 40–54)
HDLC SERPL-MCNC: 59 MG/DL (ref 40–75)
HDLC SERPL: 24.5 % (ref 20–50)
HGB BLD-MCNC: 14.7 G/DL (ref 14–18)
IMM GRANULOCYTES # BLD AUTO: 0.03 K/UL (ref 0–0.04)
IMM GRANULOCYTES NFR BLD AUTO: 0.6 % (ref 0–0.5)
INR PPP: 0.9 (ref 0.8–1.2)
LDLC SERPL CALC-MCNC: 160.6 MG/DL (ref 63–159)
LYMPHOCYTES # BLD AUTO: 1.8 K/UL (ref 1–4.8)
LYMPHOCYTES NFR BLD: 34 % (ref 18–48)
MCH RBC QN AUTO: 29.4 PG (ref 27–31)
MCHC RBC AUTO-ENTMCNC: 32.5 G/DL (ref 32–36)
MCV RBC AUTO: 90 FL (ref 82–98)
MONOCYTES # BLD AUTO: 0.5 K/UL (ref 0.3–1)
MONOCYTES NFR BLD: 10 % (ref 4–15)
NEUTROPHILS # BLD AUTO: 2.7 K/UL (ref 1.8–7.7)
NEUTROPHILS NFR BLD: 49.7 % (ref 38–73)
NONHDLC SERPL-MCNC: 182 MG/DL
NRBC BLD-RTO: 0 /100 WBC
PLATELET # BLD AUTO: 219 K/UL (ref 150–350)
PMV BLD AUTO: 10.4 FL (ref 9.2–12.9)
POTASSIUM SERPL-SCNC: 4.4 MMOL/L (ref 3.5–5.1)
PROT SERPL-MCNC: 6.9 G/DL (ref 6–8.4)
PROTHROMBIN TIME: 10.3 SEC (ref 9–12.5)
RBC # BLD AUTO: 5 M/UL (ref 4.6–6.2)
SODIUM SERPL-SCNC: 141 MMOL/L (ref 136–145)
TRIGL SERPL-MCNC: 107 MG/DL (ref 30–150)
TSH SERPL DL<=0.005 MIU/L-ACNC: 1.28 UIU/ML (ref 0.4–4)
WBC # BLD AUTO: 5.32 K/UL (ref 3.9–12.7)

## 2020-09-30 PROCEDURE — 80061 LIPID PANEL: CPT

## 2020-09-30 PROCEDURE — 80053 COMPREHEN METABOLIC PANEL: CPT

## 2020-09-30 PROCEDURE — 36415 COLL VENOUS BLD VENIPUNCTURE: CPT

## 2020-09-30 PROCEDURE — 85730 THROMBOPLASTIN TIME PARTIAL: CPT

## 2020-09-30 PROCEDURE — 85025 COMPLETE CBC W/AUTO DIFF WBC: CPT

## 2020-09-30 PROCEDURE — 85610 PROTHROMBIN TIME: CPT

## 2020-09-30 PROCEDURE — 84443 ASSAY THYROID STIM HORMONE: CPT

## 2020-10-29 ENCOUNTER — TELEPHONE (OUTPATIENT)
Dept: OPHTHALMOLOGY | Facility: CLINIC | Age: 60
End: 2020-10-29

## 2020-10-29 DIAGNOSIS — H40.003 OPEN ANGLE GLAUCOMA WITH BORDERLINE INTRAOCULAR PRESSURE, BILATERAL: ICD-10-CM

## 2020-10-29 RX ORDER — DORZOLAMIDE HYDROCHLORIDE AND TIMOLOL MALEATE 20; 5 MG/ML; MG/ML
1 SOLUTION/ DROPS OPHTHALMIC 2 TIMES DAILY
Qty: 10 ML | Refills: 4 | Status: SHIPPED | OUTPATIENT
Start: 2020-10-29 | End: 2021-12-26 | Stop reason: SDUPTHER

## 2020-10-29 NOTE — TELEPHONE ENCOUNTER
Left message for pt about scheduling an appt and to let pt know we refilled his drops 2 more times but will need to be seen.

## 2020-10-29 NOTE — TELEPHONE ENCOUNTER
----- Message from Anuradha Batres sent at 10/29/2020 12:46 PM CDT -----  Regarding: Refills  Contact: Navi Dennise  Pt calling to get refills on his Dorzolomide-Timolol.  Please call in to       [facility] - [place] [address]  [address] [place] [address]  Phone: [phone] Fax: [phone]

## 2020-11-02 ENCOUNTER — PATIENT MESSAGE (OUTPATIENT)
Dept: INTERNAL MEDICINE | Facility: CLINIC | Age: 60
End: 2020-11-02

## 2020-11-02 RX ORDER — ATORVASTATIN CALCIUM 40 MG/1
40 TABLET, FILM COATED ORAL NIGHTLY
Qty: 90 TABLET | Refills: 3 | Status: SHIPPED | OUTPATIENT
Start: 2020-11-02 | End: 2022-11-28 | Stop reason: SDUPTHER

## 2020-11-02 NOTE — TELEPHONE ENCOUNTER
2:35 PM Contacted patient via telephone to discuss lab results. All labs normal, except for elevated . We discussed ASCVD risk 12.3% and current recommendations for statin therapy. Side effects including hepatic dysfunction (CMP WNL currently) and myalgias were discussed. Non-modifiable and modifiable risk factors, including diet, physical activity, blood pressure and blood glucose control, were discussed. ACC recommendations for 150 min/week of moderate intensity physical activity and healthy diet were encouraged. Patient reports has been eating a lot of junk food with son and used to run, but not in a couple years. Prescription for atorvastatin 40 mg QHS sent to pharmacy. Discussed case with Dr. Garcia.

## 2021-01-22 ENCOUNTER — PATIENT MESSAGE (OUTPATIENT)
Dept: INTERNAL MEDICINE | Facility: CLINIC | Age: 61
End: 2021-01-22

## 2021-01-24 RX ORDER — LANOLIN ALCOHOL/MO/W.PET/CERES
100 CREAM (GRAM) TOPICAL DAILY
Qty: 30 TABLET | Refills: 11 | Status: SHIPPED | OUTPATIENT
Start: 2021-01-24 | End: 2022-01-27

## 2021-03-05 ENCOUNTER — IMMUNIZATION (OUTPATIENT)
Dept: PRIMARY CARE CLINIC | Facility: CLINIC | Age: 61
End: 2021-03-05
Payer: COMMERCIAL

## 2021-03-05 DIAGNOSIS — Z23 NEED FOR VACCINATION: Primary | ICD-10-CM

## 2021-03-05 PROCEDURE — 91303 PR SARSCOV2 VAC AD26 .5ML IM: ICD-10-PCS | Mod: S$GLB,,, | Performed by: INTERNAL MEDICINE

## 2021-03-05 PROCEDURE — 91303 PR SARSCOV2 VAC AD26 .5ML IM: CPT | Mod: S$GLB,,, | Performed by: INTERNAL MEDICINE

## 2021-03-05 PROCEDURE — 0031A PR IMMUNIZ ADMIN, SARS-COV-2 COVID-19 VACC, 5X10VP/0.5ML: CPT | Mod: CV19,S$GLB,, | Performed by: INTERNAL MEDICINE

## 2021-03-05 PROCEDURE — 0031A PR IMMUNIZ ADMIN, SARS-COV-2 COVID-19 VACC, 5X10VP/0.5ML: ICD-10-PCS | Mod: CV19,S$GLB,, | Performed by: INTERNAL MEDICINE

## 2021-03-15 ENCOUNTER — TELEPHONE (OUTPATIENT)
Dept: DERMATOLOGY | Facility: CLINIC | Age: 61
End: 2021-03-15

## 2021-03-22 ENCOUNTER — OFFICE VISIT (OUTPATIENT)
Dept: SPORTS MEDICINE | Facility: CLINIC | Age: 61
End: 2021-03-22
Payer: COMMERCIAL

## 2021-03-22 ENCOUNTER — HOSPITAL ENCOUNTER (OUTPATIENT)
Dept: RADIOLOGY | Facility: HOSPITAL | Age: 61
Discharge: HOME | End: 2021-03-22
Attending: ORTHOPAEDIC SURGERY
Payer: COMMERCIAL

## 2021-03-22 VITALS
BODY MASS INDEX: 26.7 KG/M2 | HEART RATE: 59 BPM | SYSTOLIC BLOOD PRESSURE: 136 MMHG | WEIGHT: 165.38 LBS | DIASTOLIC BLOOD PRESSURE: 78 MMHG

## 2021-03-22 DIAGNOSIS — M25.511 ACUTE PAIN OF RIGHT SHOULDER: Primary | ICD-10-CM

## 2021-03-22 DIAGNOSIS — M25.511 ACUTE PAIN OF RIGHT SHOULDER: ICD-10-CM

## 2021-03-22 PROCEDURE — 99203 PR OFFICE/OUTPT VISIT, NEW, LEVL III, 30-44 MIN: ICD-10-PCS | Mod: S$GLB,,, | Performed by: ORTHOPAEDIC SURGERY

## 2021-03-22 PROCEDURE — 99999 PR PBB SHADOW E&M-EST. PATIENT-LVL III: ICD-10-PCS | Mod: PBBFAC,,, | Performed by: ORTHOPAEDIC SURGERY

## 2021-03-22 PROCEDURE — 1125F AMNT PAIN NOTED PAIN PRSNT: CPT | Mod: S$GLB,,, | Performed by: ORTHOPAEDIC SURGERY

## 2021-03-22 PROCEDURE — 73030 X-RAY EXAM OF SHOULDER: CPT | Mod: 26,RT,, | Performed by: RADIOLOGY

## 2021-03-22 PROCEDURE — 1125F PR PAIN SEVERITY QUANTIFIED, PAIN PRESENT: ICD-10-PCS | Mod: S$GLB,,, | Performed by: ORTHOPAEDIC SURGERY

## 2021-03-22 PROCEDURE — 99999 PR PBB SHADOW E&M-EST. PATIENT-LVL III: CPT | Mod: PBBFAC,,, | Performed by: ORTHOPAEDIC SURGERY

## 2021-03-22 PROCEDURE — 3008F BODY MASS INDEX DOCD: CPT | Mod: CPTII,S$GLB,, | Performed by: ORTHOPAEDIC SURGERY

## 2021-03-22 PROCEDURE — 73030 X-RAY EXAM OF SHOULDER: CPT | Mod: TC,RT

## 2021-03-22 PROCEDURE — 3008F PR BODY MASS INDEX (BMI) DOCUMENTED: ICD-10-PCS | Mod: CPTII,S$GLB,, | Performed by: ORTHOPAEDIC SURGERY

## 2021-03-22 PROCEDURE — 99203 OFFICE O/P NEW LOW 30 MIN: CPT | Mod: S$GLB,,, | Performed by: ORTHOPAEDIC SURGERY

## 2021-03-22 PROCEDURE — 73030 XR SHOULDER COMPLETE 2 OR MORE VIEWS RIGHT: ICD-10-PCS | Mod: 26,RT,, | Performed by: RADIOLOGY

## 2021-03-29 ENCOUNTER — PATIENT MESSAGE (OUTPATIENT)
Dept: SPORTS MEDICINE | Facility: CLINIC | Age: 61
End: 2021-03-29

## 2021-06-23 ENCOUNTER — TELEPHONE (OUTPATIENT)
Dept: DERMATOLOGY | Facility: CLINIC | Age: 61
End: 2021-06-23

## 2021-08-03 ENCOUNTER — TELEPHONE (OUTPATIENT)
Dept: INTERNAL MEDICINE | Facility: CLINIC | Age: 61
End: 2021-08-03

## 2021-08-03 ENCOUNTER — OFFICE VISIT (OUTPATIENT)
Dept: INTERNAL MEDICINE | Facility: CLINIC | Age: 61
End: 2021-08-03
Payer: COMMERCIAL

## 2021-08-03 ENCOUNTER — LAB VISIT (OUTPATIENT)
Dept: LAB | Facility: HOSPITAL | Age: 61
End: 2021-08-03
Attending: FAMILY MEDICINE
Payer: COMMERCIAL

## 2021-08-03 VITALS
SYSTOLIC BLOOD PRESSURE: 138 MMHG | HEIGHT: 66 IN | HEART RATE: 55 BPM | WEIGHT: 160.94 LBS | OXYGEN SATURATION: 99 % | BODY MASS INDEX: 25.86 KG/M2 | TEMPERATURE: 99 F | DIASTOLIC BLOOD PRESSURE: 78 MMHG

## 2021-08-03 DIAGNOSIS — E78.5 DYSLIPIDEMIA: ICD-10-CM

## 2021-08-03 DIAGNOSIS — F32.A DEPRESSION, UNSPECIFIED DEPRESSION TYPE: ICD-10-CM

## 2021-08-03 DIAGNOSIS — Z76.89 ESTABLISHING CARE WITH NEW DOCTOR, ENCOUNTER FOR: ICD-10-CM

## 2021-08-03 DIAGNOSIS — H61.20 WAX IN EAR: ICD-10-CM

## 2021-08-03 DIAGNOSIS — R03.0 ELEVATED BP WITHOUT DIAGNOSIS OF HYPERTENSION: ICD-10-CM

## 2021-08-03 DIAGNOSIS — R06.02 SHORTNESS OF BREATH: ICD-10-CM

## 2021-08-03 DIAGNOSIS — Z76.89 ESTABLISHING CARE WITH NEW DOCTOR, ENCOUNTER FOR: Primary | ICD-10-CM

## 2021-08-03 DIAGNOSIS — F41.9 ANXIETY: ICD-10-CM

## 2021-08-03 DIAGNOSIS — F17.210 NICOTINE DEPENDENCE, CIGARETTES, UNCOMPLICATED: ICD-10-CM

## 2021-08-03 DIAGNOSIS — K51.90 ULCERATIVE COLITIS WITHOUT COMPLICATIONS, UNSPECIFIED LOCATION: ICD-10-CM

## 2021-08-03 LAB
ALBUMIN SERPL BCP-MCNC: 3.9 G/DL (ref 3.5–5.2)
ALP SERPL-CCNC: 39 U/L (ref 55–135)
ALT SERPL W/O P-5'-P-CCNC: 20 U/L (ref 10–44)
ANION GAP SERPL CALC-SCNC: 5 MMOL/L (ref 8–16)
AST SERPL-CCNC: 22 U/L (ref 10–40)
BASOPHILS # BLD AUTO: 0.03 K/UL (ref 0–0.2)
BASOPHILS NFR BLD: 0.5 % (ref 0–1.9)
BILIRUB SERPL-MCNC: 0.6 MG/DL (ref 0.1–1)
BUN SERPL-MCNC: 11 MG/DL (ref 8–23)
CALCIUM SERPL-MCNC: 9.6 MG/DL (ref 8.7–10.5)
CHLORIDE SERPL-SCNC: 109 MMOL/L (ref 95–110)
CHOLEST SERPL-MCNC: 224 MG/DL (ref 120–199)
CHOLEST/HDLC SERPL: 4.1 {RATIO} (ref 2–5)
CO2 SERPL-SCNC: 27 MMOL/L (ref 23–29)
COMPLEXED PSA SERPL-MCNC: 2.2 NG/ML (ref 0–4)
CREAT SERPL-MCNC: 1 MG/DL (ref 0.5–1.4)
DIFFERENTIAL METHOD: NORMAL
EOSINOPHIL # BLD AUTO: 0.1 K/UL (ref 0–0.5)
EOSINOPHIL NFR BLD: 2.2 % (ref 0–8)
ERYTHROCYTE [DISTWIDTH] IN BLOOD BY AUTOMATED COUNT: 12.7 % (ref 11.5–14.5)
EST. GFR  (AFRICAN AMERICAN): >60 ML/MIN/1.73 M^2
EST. GFR  (NON AFRICAN AMERICAN): >60 ML/MIN/1.73 M^2
ESTIMATED AVG GLUCOSE: 103 MG/DL (ref 68–131)
GLUCOSE SERPL-MCNC: 93 MG/DL (ref 70–110)
HBA1C MFR BLD: 5.2 % (ref 4–5.6)
HCT VFR BLD AUTO: 44.2 % (ref 40–54)
HDLC SERPL-MCNC: 54 MG/DL (ref 40–75)
HDLC SERPL: 24.1 % (ref 20–50)
HGB BLD-MCNC: 15.2 G/DL (ref 14–18)
HIV 1+2 AB+HIV1 P24 AG SERPL QL IA: NEGATIVE
IMM GRANULOCYTES # BLD AUTO: 0.02 K/UL (ref 0–0.04)
IMM GRANULOCYTES NFR BLD AUTO: 0.4 % (ref 0–0.5)
LDLC SERPL CALC-MCNC: 148.6 MG/DL (ref 63–159)
LYMPHOCYTES # BLD AUTO: 2 K/UL (ref 1–4.8)
LYMPHOCYTES NFR BLD: 36.7 % (ref 18–48)
MCH RBC QN AUTO: 29.7 PG (ref 27–31)
MCHC RBC AUTO-ENTMCNC: 34.4 G/DL (ref 32–36)
MCV RBC AUTO: 87 FL (ref 82–98)
MONOCYTES # BLD AUTO: 0.5 K/UL (ref 0.3–1)
MONOCYTES NFR BLD: 9.5 % (ref 4–15)
NEUTROPHILS # BLD AUTO: 2.8 K/UL (ref 1.8–7.7)
NEUTROPHILS NFR BLD: 50.7 % (ref 38–73)
NONHDLC SERPL-MCNC: 170 MG/DL
NRBC BLD-RTO: 0 /100 WBC
PLATELET # BLD AUTO: 249 K/UL (ref 150–450)
PMV BLD AUTO: 10 FL (ref 9.2–12.9)
POTASSIUM SERPL-SCNC: 4.3 MMOL/L (ref 3.5–5.1)
PROT SERPL-MCNC: 7 G/DL (ref 6–8.4)
RBC # BLD AUTO: 5.11 M/UL (ref 4.6–6.2)
SODIUM SERPL-SCNC: 141 MMOL/L (ref 136–145)
TRIGL SERPL-MCNC: 107 MG/DL (ref 30–150)
TSH SERPL DL<=0.005 MIU/L-ACNC: 0.85 UIU/ML (ref 0.4–4)
WBC # BLD AUTO: 5.48 K/UL (ref 3.9–12.7)

## 2021-08-03 PROCEDURE — 84153 ASSAY OF PSA TOTAL: CPT | Performed by: FAMILY MEDICINE

## 2021-08-03 PROCEDURE — 3008F PR BODY MASS INDEX (BMI) DOCUMENTED: ICD-10-PCS | Mod: CPTII,S$GLB,, | Performed by: FAMILY MEDICINE

## 2021-08-03 PROCEDURE — 3078F PR MOST RECENT DIASTOLIC BLOOD PRESSURE < 80 MM HG: ICD-10-PCS | Mod: CPTII,S$GLB,, | Performed by: FAMILY MEDICINE

## 2021-08-03 PROCEDURE — 84443 ASSAY THYROID STIM HORMONE: CPT | Performed by: FAMILY MEDICINE

## 2021-08-03 PROCEDURE — 83036 HEMOGLOBIN GLYCOSYLATED A1C: CPT | Performed by: FAMILY MEDICINE

## 2021-08-03 PROCEDURE — 1125F PR PAIN SEVERITY QUANTIFIED, PAIN PRESENT: ICD-10-PCS | Mod: CPTII,S$GLB,, | Performed by: FAMILY MEDICINE

## 2021-08-03 PROCEDURE — 1159F PR MEDICATION LIST DOCUMENTED IN MEDICAL RECORD: ICD-10-PCS | Mod: CPTII,S$GLB,, | Performed by: FAMILY MEDICINE

## 2021-08-03 PROCEDURE — 99396 PR PREVENTIVE VISIT,EST,40-64: ICD-10-PCS | Mod: S$GLB,,, | Performed by: FAMILY MEDICINE

## 2021-08-03 PROCEDURE — 3078F DIAST BP <80 MM HG: CPT | Mod: CPTII,S$GLB,, | Performed by: FAMILY MEDICINE

## 2021-08-03 PROCEDURE — 3008F BODY MASS INDEX DOCD: CPT | Mod: CPTII,S$GLB,, | Performed by: FAMILY MEDICINE

## 2021-08-03 PROCEDURE — 85025 COMPLETE CBC W/AUTO DIFF WBC: CPT | Performed by: FAMILY MEDICINE

## 2021-08-03 PROCEDURE — 1159F MED LIST DOCD IN RCRD: CPT | Mod: CPTII,S$GLB,, | Performed by: FAMILY MEDICINE

## 2021-08-03 PROCEDURE — 87389 HIV-1 AG W/HIV-1&-2 AB AG IA: CPT | Performed by: FAMILY MEDICINE

## 2021-08-03 PROCEDURE — 3075F SYST BP GE 130 - 139MM HG: CPT | Mod: CPTII,S$GLB,, | Performed by: FAMILY MEDICINE

## 2021-08-03 PROCEDURE — 80061 LIPID PANEL: CPT | Performed by: FAMILY MEDICINE

## 2021-08-03 PROCEDURE — 99999 PR PBB SHADOW E&M-EST. PATIENT-LVL V: CPT | Mod: PBBFAC,,, | Performed by: FAMILY MEDICINE

## 2021-08-03 PROCEDURE — 1125F AMNT PAIN NOTED PAIN PRSNT: CPT | Mod: CPTII,S$GLB,, | Performed by: FAMILY MEDICINE

## 2021-08-03 PROCEDURE — 99999 PR PBB SHADOW E&M-EST. PATIENT-LVL V: ICD-10-PCS | Mod: PBBFAC,,, | Performed by: FAMILY MEDICINE

## 2021-08-03 PROCEDURE — 99396 PREV VISIT EST AGE 40-64: CPT | Mod: S$GLB,,, | Performed by: FAMILY MEDICINE

## 2021-08-03 PROCEDURE — 3075F PR MOST RECENT SYSTOLIC BLOOD PRESS GE 130-139MM HG: ICD-10-PCS | Mod: CPTII,S$GLB,, | Performed by: FAMILY MEDICINE

## 2021-08-03 PROCEDURE — 36415 COLL VENOUS BLD VENIPUNCTURE: CPT | Performed by: FAMILY MEDICINE

## 2021-08-03 PROCEDURE — 80053 COMPREHEN METABOLIC PANEL: CPT | Performed by: FAMILY MEDICINE

## 2021-08-03 RX ORDER — ALPRAZOLAM 1 MG/1
TABLET ORAL
Qty: 15 TABLET | Refills: 0 | Status: SHIPPED | OUTPATIENT
Start: 2021-08-03 | End: 2022-01-27

## 2021-08-12 ENCOUNTER — PATIENT MESSAGE (OUTPATIENT)
Dept: INTERNAL MEDICINE | Facility: CLINIC | Age: 61
End: 2021-08-12

## 2021-08-13 ENCOUNTER — HOSPITAL ENCOUNTER (OUTPATIENT)
Dept: PULMONOLOGY | Facility: CLINIC | Age: 61
Discharge: HOME | End: 2021-08-13
Payer: COMMERCIAL

## 2021-08-13 ENCOUNTER — HOSPITAL ENCOUNTER (OUTPATIENT)
Dept: RADIOLOGY | Facility: HOSPITAL | Age: 61
Discharge: HOME | End: 2021-08-13
Attending: FAMILY MEDICINE
Payer: COMMERCIAL

## 2021-08-13 ENCOUNTER — TELEPHONE (OUTPATIENT)
Dept: INTERNAL MEDICINE | Facility: CLINIC | Age: 61
End: 2021-08-13

## 2021-08-13 DIAGNOSIS — Z13.9 SCREENING PROCEDURE: Primary | ICD-10-CM

## 2021-08-13 DIAGNOSIS — F17.210 NICOTINE DEPENDENCE, CIGARETTES, UNCOMPLICATED: ICD-10-CM

## 2021-08-13 DIAGNOSIS — R06.02 SHORTNESS OF BREATH: ICD-10-CM

## 2021-08-13 PROBLEM — J44.9 COPD (CHRONIC OBSTRUCTIVE PULMONARY DISEASE): Status: ACTIVE | Noted: 2021-08-13

## 2021-08-13 LAB — SARS-COV-2 RDRP RESP QL NAA+PROBE: NEGATIVE

## 2021-08-13 PROCEDURE — 71271 CT CHEST LUNG SCREENING LOW DOSE: ICD-10-PCS | Mod: 26,,, | Performed by: RADIOLOGY

## 2021-08-13 PROCEDURE — 94727 PR PULM FUNCTION TEST BY GAS: ICD-10-PCS | Mod: S$GLB,,, | Performed by: INTERNAL MEDICINE

## 2021-08-13 PROCEDURE — 94729 DIFFUSING CAPACITY: CPT | Mod: S$GLB,,, | Performed by: INTERNAL MEDICINE

## 2021-08-13 PROCEDURE — 94060 PR EVAL OF BRONCHOSPASM: ICD-10-PCS | Mod: S$GLB,,, | Performed by: INTERNAL MEDICINE

## 2021-08-13 PROCEDURE — 94727 GAS DIL/WSHOT DETER LNG VOL: CPT | Mod: S$GLB,,, | Performed by: INTERNAL MEDICINE

## 2021-08-13 PROCEDURE — 71271 CT THORAX LUNG CANCER SCR C-: CPT | Mod: TC

## 2021-08-13 PROCEDURE — 94729 PR C02/MEMBANE DIFFUSE CAPACITY: ICD-10-PCS | Mod: S$GLB,,, | Performed by: INTERNAL MEDICINE

## 2021-08-13 PROCEDURE — U0002 COVID-19 LAB TEST NON-CDC: HCPCS | Performed by: FAMILY MEDICINE

## 2021-08-13 PROCEDURE — 71271 CT THORAX LUNG CANCER SCR C-: CPT | Mod: 26,,, | Performed by: RADIOLOGY

## 2021-08-13 PROCEDURE — 94060 EVALUATION OF WHEEZING: CPT | Mod: S$GLB,,, | Performed by: INTERNAL MEDICINE

## 2021-08-13 RX ORDER — ALBUTEROL SULFATE 90 UG/1
2 AEROSOL, METERED RESPIRATORY (INHALATION) EVERY 6 HOURS PRN
Qty: 18 G | Status: SHIPPED | OUTPATIENT
Start: 2021-08-13 | End: 2023-01-19 | Stop reason: SDUPTHER

## 2021-08-16 ENCOUNTER — PATIENT MESSAGE (OUTPATIENT)
Dept: INTERNAL MEDICINE | Facility: CLINIC | Age: 61
End: 2021-08-16

## 2021-08-19 ENCOUNTER — PATIENT OUTREACH (OUTPATIENT)
Dept: ADMINISTRATIVE | Facility: OTHER | Age: 61
End: 2021-08-19

## 2021-08-20 ENCOUNTER — OFFICE VISIT (OUTPATIENT)
Dept: OPTOMETRY | Facility: CLINIC | Age: 61
End: 2021-08-20
Payer: COMMERCIAL

## 2021-08-20 DIAGNOSIS — H40.013 OPEN ANGLE WITH BORDERLINE FINDINGS, LOW RISK, BILATERAL: Primary | ICD-10-CM

## 2021-08-20 PROCEDURE — 1126F PR PAIN SEVERITY QUANTIFIED, NO PAIN PRESENT: ICD-10-PCS | Mod: CPTII,S$GLB,, | Performed by: OPTOMETRIST

## 2021-08-20 PROCEDURE — 92014 COMPRE OPH EXAM EST PT 1/>: CPT | Mod: S$GLB,,, | Performed by: OPTOMETRIST

## 2021-08-20 PROCEDURE — 92014 PR EYE EXAM, EST PATIENT,COMPREHESV: ICD-10-PCS | Mod: S$GLB,,, | Performed by: OPTOMETRIST

## 2021-08-20 PROCEDURE — 3044F PR MOST RECENT HEMOGLOBIN A1C LEVEL <7.0%: ICD-10-PCS | Mod: CPTII,S$GLB,, | Performed by: OPTOMETRIST

## 2021-08-20 PROCEDURE — 1159F PR MEDICATION LIST DOCUMENTED IN MEDICAL RECORD: ICD-10-PCS | Mod: CPTII,S$GLB,, | Performed by: OPTOMETRIST

## 2021-08-20 PROCEDURE — 1126F AMNT PAIN NOTED NONE PRSNT: CPT | Mod: CPTII,S$GLB,, | Performed by: OPTOMETRIST

## 2021-08-20 PROCEDURE — 1159F MED LIST DOCD IN RCRD: CPT | Mod: CPTII,S$GLB,, | Performed by: OPTOMETRIST

## 2021-08-20 PROCEDURE — 1160F RVW MEDS BY RX/DR IN RCRD: CPT | Mod: CPTII,S$GLB,, | Performed by: OPTOMETRIST

## 2021-08-20 PROCEDURE — 3044F HG A1C LEVEL LT 7.0%: CPT | Mod: CPTII,S$GLB,, | Performed by: OPTOMETRIST

## 2021-08-20 PROCEDURE — 1160F PR REVIEW ALL MEDS BY PRESCRIBER/CLIN PHARMACIST DOCUMENTED: ICD-10-PCS | Mod: CPTII,S$GLB,, | Performed by: OPTOMETRIST

## 2021-08-20 PROCEDURE — 99999 PR PBB SHADOW E&M-EST. PATIENT-LVL II: CPT | Mod: PBBFAC,,, | Performed by: OPTOMETRIST

## 2021-08-20 PROCEDURE — 99999 PR PBB SHADOW E&M-EST. PATIENT-LVL II: ICD-10-PCS | Mod: PBBFAC,,, | Performed by: OPTOMETRIST

## 2021-08-23 ENCOUNTER — PATIENT MESSAGE (OUTPATIENT)
Dept: OPTOMETRY | Facility: CLINIC | Age: 61
End: 2021-08-23

## 2021-09-27 ENCOUNTER — TELEPHONE (OUTPATIENT)
Dept: GASTROENTEROLOGY | Facility: CLINIC | Age: 61
End: 2021-09-27

## 2021-09-30 ENCOUNTER — CLINICAL SUPPORT (OUTPATIENT)
Dept: AUDIOLOGY | Facility: CLINIC | Age: 61
End: 2021-09-30
Payer: COMMERCIAL

## 2021-09-30 ENCOUNTER — OFFICE VISIT (OUTPATIENT)
Dept: OTOLARYNGOLOGY | Facility: CLINIC | Age: 61
End: 2021-09-30
Payer: COMMERCIAL

## 2021-09-30 ENCOUNTER — TELEPHONE (OUTPATIENT)
Dept: GASTROENTEROLOGY | Facility: CLINIC | Age: 61
End: 2021-09-30

## 2021-09-30 VITALS — SYSTOLIC BLOOD PRESSURE: 124 MMHG | HEART RATE: 64 BPM | DIASTOLIC BLOOD PRESSURE: 77 MMHG

## 2021-09-30 DIAGNOSIS — Z77.122 HISTORY OF EXPOSURE TO NOISE: Primary | ICD-10-CM

## 2021-09-30 DIAGNOSIS — H90.3 SENSORINEURAL HEARING LOSS, BILATERAL: ICD-10-CM

## 2021-09-30 DIAGNOSIS — H61.23 BILATERAL IMPACTED CERUMEN: ICD-10-CM

## 2021-09-30 DIAGNOSIS — H90.3 SENSORINEURAL HEARING LOSS, BILATERAL: Primary | ICD-10-CM

## 2021-09-30 DIAGNOSIS — H93.13 TINNITUS OF BOTH EARS: ICD-10-CM

## 2021-09-30 DIAGNOSIS — H61.20 WAX IN EAR: ICD-10-CM

## 2021-09-30 PROCEDURE — 99999 PR PBB SHADOW E&M-EST. PATIENT-LVL III: CPT | Mod: PBBFAC,,, | Performed by: OTOLARYNGOLOGY

## 2021-09-30 PROCEDURE — 3074F SYST BP LT 130 MM HG: CPT | Mod: CPTII,S$GLB,, | Performed by: OTOLARYNGOLOGY

## 2021-09-30 PROCEDURE — 99203 PR OFFICE/OUTPT VISIT, NEW, LEVL III, 30-44 MIN: ICD-10-PCS | Mod: 25,S$GLB,, | Performed by: OTOLARYNGOLOGY

## 2021-09-30 PROCEDURE — 3078F DIAST BP <80 MM HG: CPT | Mod: CPTII,S$GLB,, | Performed by: OTOLARYNGOLOGY

## 2021-09-30 PROCEDURE — 69210 REMOVE IMPACTED EAR WAX UNI: CPT | Mod: S$GLB,,, | Performed by: OTOLARYNGOLOGY

## 2021-09-30 PROCEDURE — 3074F PR MOST RECENT SYSTOLIC BLOOD PRESSURE < 130 MM HG: ICD-10-PCS | Mod: CPTII,S$GLB,, | Performed by: OTOLARYNGOLOGY

## 2021-09-30 PROCEDURE — 99999 PR PBB SHADOW E&M-EST. PATIENT-LVL III: ICD-10-PCS | Mod: PBBFAC,,, | Performed by: OTOLARYNGOLOGY

## 2021-09-30 PROCEDURE — 99203 OFFICE O/P NEW LOW 30 MIN: CPT | Mod: 25,S$GLB,, | Performed by: OTOLARYNGOLOGY

## 2021-09-30 PROCEDURE — 92567 PR TYMPA2METRY: ICD-10-PCS | Mod: S$GLB,,, | Performed by: AUDIOLOGIST

## 2021-09-30 PROCEDURE — 3078F PR MOST RECENT DIASTOLIC BLOOD PRESSURE < 80 MM HG: ICD-10-PCS | Mod: CPTII,S$GLB,, | Performed by: OTOLARYNGOLOGY

## 2021-09-30 PROCEDURE — 92567 TYMPANOMETRY: CPT | Mod: S$GLB,,, | Performed by: AUDIOLOGIST

## 2021-09-30 PROCEDURE — 3044F HG A1C LEVEL LT 7.0%: CPT | Mod: CPTII,S$GLB,, | Performed by: OTOLARYNGOLOGY

## 2021-09-30 PROCEDURE — 92557 COMPREHENSIVE HEARING TEST: CPT | Mod: S$GLB,,, | Performed by: AUDIOLOGIST

## 2021-09-30 PROCEDURE — 1159F MED LIST DOCD IN RCRD: CPT | Mod: CPTII,S$GLB,, | Performed by: OTOLARYNGOLOGY

## 2021-09-30 PROCEDURE — 1159F PR MEDICATION LIST DOCUMENTED IN MEDICAL RECORD: ICD-10-PCS | Mod: CPTII,S$GLB,, | Performed by: OTOLARYNGOLOGY

## 2021-09-30 PROCEDURE — 69210 PR REMOVAL IMPACTED CERUMEN REQUIRING INSTRUMENTATION, UNILATERAL: ICD-10-PCS | Mod: S$GLB,,, | Performed by: OTOLARYNGOLOGY

## 2021-09-30 PROCEDURE — 92557 PR COMPREHENSIVE HEARING TEST: ICD-10-PCS | Mod: S$GLB,,, | Performed by: AUDIOLOGIST

## 2021-09-30 PROCEDURE — 3044F PR MOST RECENT HEMOGLOBIN A1C LEVEL <7.0%: ICD-10-PCS | Mod: CPTII,S$GLB,, | Performed by: OTOLARYNGOLOGY

## 2021-10-06 ENCOUNTER — TELEPHONE (OUTPATIENT)
Dept: GASTROENTEROLOGY | Facility: CLINIC | Age: 61
End: 2021-10-06

## 2021-10-13 ENCOUNTER — TELEPHONE (OUTPATIENT)
Dept: GASTROENTEROLOGY | Facility: CLINIC | Age: 61
End: 2021-10-13

## 2021-10-27 ENCOUNTER — TELEPHONE (OUTPATIENT)
Dept: GASTROENTEROLOGY | Facility: CLINIC | Age: 61
End: 2021-10-27
Payer: COMMERCIAL

## 2021-11-05 ENCOUNTER — TELEPHONE (OUTPATIENT)
Dept: OPHTHALMOLOGY | Facility: CLINIC | Age: 61
End: 2021-11-05
Payer: COMMERCIAL

## 2021-11-17 ENCOUNTER — OFFICE VISIT (OUTPATIENT)
Dept: PSYCHIATRY | Facility: CLINIC | Age: 61
End: 2021-11-17
Payer: COMMERCIAL

## 2021-11-17 DIAGNOSIS — F10.20 ALCOHOL USE DISORDER, MODERATE, DEPENDENCE: ICD-10-CM

## 2021-11-17 DIAGNOSIS — F32.A DEPRESSION, UNSPECIFIED DEPRESSION TYPE: ICD-10-CM

## 2021-11-17 DIAGNOSIS — F41.1 GENERALIZED ANXIETY DISORDER: Primary | ICD-10-CM

## 2021-11-17 PROCEDURE — 90837 PSYTX W PT 60 MINUTES: CPT | Mod: S$GLB,,, | Performed by: SOCIAL WORKER

## 2021-11-17 PROCEDURE — 90837 PR PSYCHOTHERAPY W/PATIENT, 60 MIN: ICD-10-PCS | Mod: S$GLB,,, | Performed by: SOCIAL WORKER

## 2021-11-17 PROCEDURE — 3044F HG A1C LEVEL LT 7.0%: CPT | Mod: CPTII,S$GLB,, | Performed by: SOCIAL WORKER

## 2021-11-17 PROCEDURE — 3044F PR MOST RECENT HEMOGLOBIN A1C LEVEL <7.0%: ICD-10-PCS | Mod: CPTII,S$GLB,, | Performed by: SOCIAL WORKER

## 2021-11-24 ENCOUNTER — PATIENT MESSAGE (OUTPATIENT)
Dept: PSYCHIATRY | Facility: CLINIC | Age: 61
End: 2021-11-24
Payer: COMMERCIAL

## 2021-11-30 ENCOUNTER — TELEPHONE (OUTPATIENT)
Dept: PSYCHIATRY | Facility: CLINIC | Age: 61
End: 2021-11-30
Payer: COMMERCIAL

## 2021-12-03 ENCOUNTER — OFFICE VISIT (OUTPATIENT)
Dept: PSYCHIATRY | Facility: CLINIC | Age: 61
End: 2021-12-03
Payer: COMMERCIAL

## 2021-12-03 DIAGNOSIS — F10.20 ALCOHOL USE DISORDER, MODERATE, DEPENDENCE: Primary | ICD-10-CM

## 2021-12-03 DIAGNOSIS — F32.A DEPRESSION, UNSPECIFIED DEPRESSION TYPE: ICD-10-CM

## 2021-12-03 DIAGNOSIS — F41.1 GENERALIZED ANXIETY DISORDER: ICD-10-CM

## 2021-12-03 PROCEDURE — 90837 PR PSYCHOTHERAPY W/PATIENT, 60 MIN: ICD-10-PCS | Mod: S$GLB,,, | Performed by: SOCIAL WORKER

## 2021-12-03 PROCEDURE — 90837 PSYTX W PT 60 MINUTES: CPT | Mod: S$GLB,,, | Performed by: SOCIAL WORKER

## 2021-12-26 DIAGNOSIS — H40.003 OPEN ANGLE GLAUCOMA WITH BORDERLINE INTRAOCULAR PRESSURE, BILATERAL: ICD-10-CM

## 2021-12-27 RX ORDER — DORZOLAMIDE HYDROCHLORIDE AND TIMOLOL MALEATE 20; 5 MG/ML; MG/ML
1 SOLUTION/ DROPS OPHTHALMIC 2 TIMES DAILY
Qty: 10 ML | Refills: 4 | Status: SHIPPED | OUTPATIENT
Start: 2021-12-27 | End: 2022-02-14 | Stop reason: SDUPTHER

## 2022-01-12 ENCOUNTER — OFFICE VISIT (OUTPATIENT)
Dept: PSYCHIATRY | Facility: CLINIC | Age: 62
End: 2022-01-12
Payer: COMMERCIAL

## 2022-01-12 DIAGNOSIS — F10.20 ALCOHOL USE DISORDER, MODERATE, DEPENDENCE: Primary | ICD-10-CM

## 2022-01-12 DIAGNOSIS — F41.1 GENERALIZED ANXIETY DISORDER: ICD-10-CM

## 2022-01-12 DIAGNOSIS — F32.A DEPRESSION, UNSPECIFIED DEPRESSION TYPE: ICD-10-CM

## 2022-01-12 PROCEDURE — 90837 PR PSYCHOTHERAPY W/PATIENT, 60 MIN: ICD-10-PCS | Mod: S$GLB,,, | Performed by: SOCIAL WORKER

## 2022-01-12 PROCEDURE — 90837 PSYTX W PT 60 MINUTES: CPT | Mod: S$GLB,,, | Performed by: SOCIAL WORKER

## 2022-01-17 ENCOUNTER — PATIENT MESSAGE (OUTPATIENT)
Dept: GASTROENTEROLOGY | Facility: CLINIC | Age: 62
End: 2022-01-17
Payer: COMMERCIAL

## 2022-01-17 ENCOUNTER — PATIENT MESSAGE (OUTPATIENT)
Dept: INTERNAL MEDICINE | Facility: CLINIC | Age: 62
End: 2022-01-17
Payer: COMMERCIAL

## 2022-01-17 DIAGNOSIS — K92.1 BLOOD IN STOOL: Primary | ICD-10-CM

## 2022-01-18 NOTE — PROGRESS NOTES
Individual Psychotherapy (PhD/LCSW)    1/12/2022    Site:  [facility]         Therapeutic Intervention: Met with patient.  Outpatient - Insight oriented psychotherapy 60 min - CPT code 35895, Outpatient - Behavior modifying psychotherapy 60 min - CPT code 33941 and Outpatient - Supportive psychotherapy 60 min - CPT Code 23704    Chief complaint/reason for encounter: depression, anxiety, sleep, behavior and interpersonal     Interval history and content of current session: Pt presents on time to scheduled individual session. Last seen for therapy one month ago. Pt reports he had 3 beers on Jan 3rd, no alcohol use since. Gave pt positive feedback on changes, discussed his experience and goals for overall health. Pt notes wife has also stopped use. Last attempt at sobriety was one month, then relapsed. Pt does note increase in anxiety, he attributes this to not masking feelings with alcohol. Discussed coping skills- breathing and grounding techniques. Stressed importance of finding positive outlets and activities- homework to try one new activity by next session. Pt previously enjoyed yoga, boating, drawing, woodworking. Pt resistant to medications currently, will revisit referral at next session. Plan to continue with sessions as scheduled.     Treatment plan:  · Target symptoms: alcohol abuse, depression, anxiety , substance abuse, adjustment  · Why chosen therapy is appropriate versus another modality: relevant to diagnosis, patient responds to this modality, evidence based practice  · Outcome monitoring methods: self-report, observation  · Therapeutic intervention type: insight oriented psychotherapy, behavior modifying psychotherapy, supportive psychotherapy    Risk parameters:  Patient reports no suicidal ideation  Patient reports no homicidal ideation  Patient reports no self-injurious behavior  Patient reports no violent behavior    Verbal deficits: None    Patient's response to intervention:  The  patient's response to intervention is accepting.    Progress toward goals and other mental status changes:  The patient's progress toward goals is fair - progressing slowly.     Diagnosis:     ICD-10-CM ICD-9-CM   1. Alcohol use disorder, moderate, dependence  F10.20 303.90   2. Generalized anxiety disorder  F41.1 300.02   3. Depression, unspecified depression type  F32.A 311       Plan:  individual psychotherapy, consult psychiatrist for medication evaluation and abstinence    Return to clinic: 2 weeks, as scheduled    Length of Service (minutes): 60

## 2022-01-18 NOTE — TELEPHONE ENCOUNTER
Re:       Good Morning, I am waiting on an  upcoming appt with an [facility] dr. My old doctor at  stopped taking my insurance. So I have been in limb.     I am not sure what to do. I have been seeing blood in my stools for months. It usually goes away but its not. I am feeling dizzy sometimes & weak. Please advise in these COVID times what should i do?  I  appreciate your assistance. Thanks       Ans  Please call pt  colonoscopy order is in. To schedule Your ordered colonoscopy, call [phone]. Or wait to see his GI doc on 1/27 if her prefers    Set up CBC and iron tests so we can see if there has bene enough blood loss to drop blood counts. orders are in.    Also, it looks like he is due to covid booster - let him know how to do this

## 2022-01-20 ENCOUNTER — CLINICAL SUPPORT (OUTPATIENT)
Dept: OPHTHALMOLOGY | Facility: CLINIC | Age: 62
End: 2022-01-20
Payer: COMMERCIAL

## 2022-01-20 ENCOUNTER — OFFICE VISIT (OUTPATIENT)
Dept: OPHTHALMOLOGY | Facility: CLINIC | Age: 62
End: 2022-01-20
Payer: COMMERCIAL

## 2022-01-20 DIAGNOSIS — H02.413 ACQUIRED MECHANICAL PTOSIS OF EYELID, BILATERAL: Primary | ICD-10-CM

## 2022-01-20 DIAGNOSIS — H43.813 POSTERIOR VITREOUS DETACHMENT, BILATERAL: ICD-10-CM

## 2022-01-20 DIAGNOSIS — H40.003 GLAUCOMA SUSPECT OF BOTH EYES: ICD-10-CM

## 2022-01-20 DIAGNOSIS — H43.393 OTHER VITREOUS OPACITIES, BILATERAL: ICD-10-CM

## 2022-01-20 PROCEDURE — 1159F PR MEDICATION LIST DOCUMENTED IN MEDICAL RECORD: ICD-10-PCS | Mod: CPTII,S$GLB,, | Performed by: OPHTHALMOLOGY

## 2022-01-20 PROCEDURE — 99214 OFFICE O/P EST MOD 30 MIN: CPT | Mod: S$GLB,,, | Performed by: OPHTHALMOLOGY

## 2022-01-20 PROCEDURE — 92285 EXTERNAL PHOTOGRAPHY - OU - BOTH EYES: ICD-10-PCS | Mod: S$GLB,,, | Performed by: OPHTHALMOLOGY

## 2022-01-20 PROCEDURE — 92083 GOLDMANN PERIMETRY - OU - EXTENDED - BOTH EYES: ICD-10-PCS | Mod: S$GLB,,, | Performed by: OPHTHALMOLOGY

## 2022-01-20 PROCEDURE — 92285 EXTERNAL OCULAR PHOTOGRAPHY: CPT | Mod: S$GLB,,, | Performed by: OPHTHALMOLOGY

## 2022-01-20 PROCEDURE — 99999 PR PBB SHADOW E&M-EST. PATIENT-LVL III: CPT | Mod: PBBFAC,,, | Performed by: OPHTHALMOLOGY

## 2022-01-20 PROCEDURE — 99214 PR OFFICE/OUTPT VISIT, EST, LEVL IV, 30-39 MIN: ICD-10-PCS | Mod: S$GLB,,, | Performed by: OPHTHALMOLOGY

## 2022-01-20 PROCEDURE — 1160F RVW MEDS BY RX/DR IN RCRD: CPT | Mod: CPTII,S$GLB,, | Performed by: OPHTHALMOLOGY

## 2022-01-20 PROCEDURE — 1159F MED LIST DOCD IN RCRD: CPT | Mod: CPTII,S$GLB,, | Performed by: OPHTHALMOLOGY

## 2022-01-20 PROCEDURE — 92083 EXTENDED VISUAL FIELD XM: CPT | Mod: S$GLB,,, | Performed by: OPHTHALMOLOGY

## 2022-01-20 PROCEDURE — 1160F PR REVIEW ALL MEDS BY PRESCRIBER/CLIN PHARMACIST DOCUMENTED: ICD-10-PCS | Mod: CPTII,S$GLB,, | Performed by: OPHTHALMOLOGY

## 2022-01-20 PROCEDURE — 99999 PR PBB SHADOW E&M-EST. PATIENT-LVL III: ICD-10-PCS | Mod: PBBFAC,,, | Performed by: OPHTHALMOLOGY
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
## 2022-01-24 ENCOUNTER — PATIENT OUTREACH (OUTPATIENT)
Dept: ADMINISTRATIVE | Facility: OTHER | Age: 62
End: 2022-01-24
Payer: COMMERCIAL

## 2022-01-24 NOTE — PROGRESS NOTES
LINKS immunization registry updated  Care Everywhere updated  Health Maintenance updated  Chart reviewed for overdue Proactive [facility] Encounters (YE) health maintenance testing (CRS, Breast Ca, Diabetic Eye Exam)   Orders entered:N/A

## 2022-01-26 ENCOUNTER — PATIENT MESSAGE (OUTPATIENT)
Dept: PSYCHIATRY | Facility: CLINIC | Age: 62
End: 2022-01-26
Payer: COMMERCIAL

## 2022-01-27 ENCOUNTER — TELEPHONE (OUTPATIENT)
Dept: ENDOSCOPY | Facility: HOSPITAL | Age: 62
End: 2022-01-27
Payer: COMMERCIAL

## 2022-01-27 ENCOUNTER — LAB VISIT (OUTPATIENT)
Dept: LAB | Facility: HOSPITAL | Age: 62
End: 2022-01-27
Attending: INTERNAL MEDICINE
Payer: COMMERCIAL

## 2022-01-27 ENCOUNTER — OFFICE VISIT (OUTPATIENT)
Dept: GASTROENTEROLOGY | Facility: CLINIC | Age: 62
End: 2022-01-27
Payer: COMMERCIAL

## 2022-01-27 VITALS
SYSTOLIC BLOOD PRESSURE: 140 MMHG | HEART RATE: 64 BPM | DIASTOLIC BLOOD PRESSURE: 78 MMHG | BODY MASS INDEX: 27.36 KG/M2 | HEIGHT: 64 IN | WEIGHT: 160.25 LBS

## 2022-01-27 DIAGNOSIS — Z12.11 SPECIAL SCREENING FOR MALIGNANT NEOPLASMS, COLON: Primary | ICD-10-CM

## 2022-01-27 DIAGNOSIS — Z87.19 HISTORY OF PANCREATITIS: ICD-10-CM

## 2022-01-27 DIAGNOSIS — K92.1 HEMATOCHEZIA: Primary | ICD-10-CM

## 2022-01-27 DIAGNOSIS — K92.1 HEMATOCHEZIA: ICD-10-CM

## 2022-01-27 DIAGNOSIS — K92.1 BLOOD IN STOOL: ICD-10-CM

## 2022-01-27 DIAGNOSIS — R10.32 LEFT LOWER QUADRANT PAIN: ICD-10-CM

## 2022-01-27 LAB
ALBUMIN SERPL BCP-MCNC: 3.9 G/DL (ref 3.5–5.2)
ALP SERPL-CCNC: 56 U/L (ref 55–135)
ALT SERPL W/O P-5'-P-CCNC: 25 U/L (ref 10–44)
ANION GAP SERPL CALC-SCNC: 7 MMOL/L (ref 8–16)
AST SERPL-CCNC: 28 U/L (ref 10–40)
BASOPHILS # BLD AUTO: 0.07 K/UL (ref 0–0.2)
BASOPHILS NFR BLD: 1 % (ref 0–1.9)
BILIRUB SERPL-MCNC: 0.7 MG/DL (ref 0.1–1)
BUN SERPL-MCNC: 11 MG/DL (ref 8–23)
CALCIUM SERPL-MCNC: 10.2 MG/DL (ref 8.7–10.5)
CHLORIDE SERPL-SCNC: 106 MMOL/L (ref 95–110)
CO2 SERPL-SCNC: 27 MMOL/L (ref 23–29)
CREAT SERPL-MCNC: 0.9 MG/DL (ref 0.5–1.4)
DIFFERENTIAL METHOD: NORMAL
EOSINOPHIL # BLD AUTO: 0.2 K/UL (ref 0–0.5)
EOSINOPHIL NFR BLD: 3.3 % (ref 0–8)
ERYTHROCYTE [DISTWIDTH] IN BLOOD BY AUTOMATED COUNT: 11.8 % (ref 11.5–14.5)
EST. GFR  (AFRICAN AMERICAN): >60 ML/MIN/1.73 M^2
EST. GFR  (NON AFRICAN AMERICAN): >60 ML/MIN/1.73 M^2
FERRITIN SERPL-MCNC: 121 NG/ML (ref 20–300)
GLUCOSE SERPL-MCNC: 65 MG/DL (ref 70–110)
HCT VFR BLD AUTO: 46.3 % (ref 40–54)
HGB BLD-MCNC: 15.3 G/DL (ref 14–18)
IGA SERPL-MCNC: 172 MG/DL (ref 40–350)
IMM GRANULOCYTES # BLD AUTO: 0.02 K/UL (ref 0–0.04)
IMM GRANULOCYTES NFR BLD AUTO: 0.3 % (ref 0–0.5)
INR PPP: 0.9 (ref 0.8–1.2)
IRON SERPL-MCNC: 101 UG/DL (ref 45–160)
LIPASE SERPL-CCNC: 55 U/L (ref 4–60)
LYMPHOCYTES # BLD AUTO: 2.1 K/UL (ref 1–4.8)
LYMPHOCYTES NFR BLD: 30 % (ref 18–48)
MCH RBC QN AUTO: 29.4 PG (ref 27–31)
MCHC RBC AUTO-ENTMCNC: 33 G/DL (ref 32–36)
MCV RBC AUTO: 89 FL (ref 82–98)
MONOCYTES # BLD AUTO: 0.7 K/UL (ref 0.3–1)
MONOCYTES NFR BLD: 10.2 % (ref 4–15)
NEUTROPHILS # BLD AUTO: 3.8 K/UL (ref 1.8–7.7)
NEUTROPHILS NFR BLD: 55.2 % (ref 38–73)
NRBC BLD-RTO: 0 /100 WBC
PLATELET # BLD AUTO: 292 K/UL (ref 150–450)
PMV BLD AUTO: 9.5 FL (ref 9.2–12.9)
POTASSIUM SERPL-SCNC: 4.1 MMOL/L (ref 3.5–5.1)
PROT SERPL-MCNC: 7.5 G/DL (ref 6–8.4)
PROTHROMBIN TIME: 10.3 SEC (ref 9–12.5)
RBC # BLD AUTO: 5.2 M/UL (ref 4.6–6.2)
SATURATED IRON: 31 % (ref 20–50)
SODIUM SERPL-SCNC: 140 MMOL/L (ref 136–145)
TOTAL IRON BINDING CAPACITY: 329 UG/DL (ref 250–450)
TRANSFERRIN SERPL-MCNC: 222 MG/DL (ref 200–375)
WBC # BLD AUTO: 6.96 K/UL (ref 3.9–12.7)

## 2022-01-27 PROCEDURE — 99204 PR OFFICE/OUTPT VISIT, NEW, LEVL IV, 45-59 MIN: ICD-10-PCS | Mod: S$GLB,,, | Performed by: INTERNAL MEDICINE

## 2022-01-27 PROCEDURE — 3077F PR MOST RECENT SYSTOLIC BLOOD PRESSURE >= 140 MM HG: ICD-10-PCS | Mod: CPTII,S$GLB,, | Performed by: INTERNAL MEDICINE

## 2022-01-27 PROCEDURE — 3008F BODY MASS INDEX DOCD: CPT | Mod: CPTII,S$GLB,, | Performed by: INTERNAL MEDICINE

## 2022-01-27 PROCEDURE — 85610 PROTHROMBIN TIME: CPT | Performed by: INTERNAL MEDICINE

## 2022-01-27 PROCEDURE — 36415 COLL VENOUS BLD VENIPUNCTURE: CPT | Performed by: INTERNAL MEDICINE

## 2022-01-27 PROCEDURE — 82728 ASSAY OF FERRITIN: CPT | Performed by: INTERNAL MEDICINE

## 2022-01-27 PROCEDURE — 82784 ASSAY IGA/IGD/IGG/IGM EACH: CPT | Performed by: INTERNAL MEDICINE

## 2022-01-27 PROCEDURE — 99999 PR PBB SHADOW E&M-EST. PATIENT-LVL IV: ICD-10-PCS | Mod: PBBFAC,,, | Performed by: INTERNAL MEDICINE

## 2022-01-27 PROCEDURE — 3078F DIAST BP <80 MM HG: CPT | Mod: CPTII,S$GLB,, | Performed by: INTERNAL MEDICINE

## 2022-01-27 PROCEDURE — 99204 OFFICE O/P NEW MOD 45 MIN: CPT | Mod: S$GLB,,, | Performed by: INTERNAL MEDICINE

## 2022-01-27 PROCEDURE — 83516 IMMUNOASSAY NONANTIBODY: CPT | Performed by: INTERNAL MEDICINE

## 2022-01-27 PROCEDURE — 3077F SYST BP >= 140 MM HG: CPT | Mod: CPTII,S$GLB,, | Performed by: INTERNAL MEDICINE

## 2022-01-27 PROCEDURE — 84466 ASSAY OF TRANSFERRIN: CPT | Performed by: INTERNAL MEDICINE

## 2022-01-27 PROCEDURE — 99999 PR PBB SHADOW E&M-EST. PATIENT-LVL IV: CPT | Mod: PBBFAC,,, | Performed by: INTERNAL MEDICINE

## 2022-01-27 PROCEDURE — 1159F MED LIST DOCD IN RCRD: CPT | Mod: CPTII,S$GLB,, | Performed by: INTERNAL MEDICINE

## 2022-01-27 PROCEDURE — 80053 COMPREHEN METABOLIC PANEL: CPT | Performed by: INTERNAL MEDICINE

## 2022-01-27 PROCEDURE — 1160F PR REVIEW ALL MEDS BY PRESCRIBER/CLIN PHARMACIST DOCUMENTED: ICD-10-PCS | Mod: CPTII,S$GLB,, | Performed by: INTERNAL MEDICINE

## 2022-01-27 PROCEDURE — 85025 COMPLETE CBC W/AUTO DIFF WBC: CPT | Performed by: INTERNAL MEDICINE

## 2022-01-27 PROCEDURE — 3008F PR BODY MASS INDEX (BMI) DOCUMENTED: ICD-10-PCS | Mod: CPTII,S$GLB,, | Performed by: INTERNAL MEDICINE

## 2022-01-27 PROCEDURE — 1159F PR MEDICATION LIST DOCUMENTED IN MEDICAL RECORD: ICD-10-PCS | Mod: CPTII,S$GLB,, | Performed by: INTERNAL MEDICINE

## 2022-01-27 PROCEDURE — 1160F RVW MEDS BY RX/DR IN RCRD: CPT | Mod: CPTII,S$GLB,, | Performed by: INTERNAL MEDICINE

## 2022-01-27 PROCEDURE — 83690 ASSAY OF LIPASE: CPT | Performed by: INTERNAL MEDICINE

## 2022-01-27 PROCEDURE — 3078F PR MOST RECENT DIASTOLIC BLOOD PRESSURE < 80 MM HG: ICD-10-PCS | Mod: CPTII,S$GLB,, | Performed by: INTERNAL MEDICINE

## 2022-01-27 RX ORDER — POLYETHYLENE GLYCOL 3350, SODIUM SULFATE ANHYDROUS, SODIUM BICARBONATE, SODIUM CHLORIDE, POTASSIUM CHLORIDE 236; 22.74; 6.74; 5.86; 2.97 G/4L; G/4L; G/4L; G/4L; G/4L
4 POWDER, FOR SOLUTION ORAL ONCE
Qty: 4000 ML | Refills: 0 | Status: SHIPPED | OUTPATIENT
Start: 2022-01-27 | End: 2022-01-27

## 2022-01-27 NOTE — PROGRESS NOTES
[facility] Consultation Note    Reason for Consult:  The primary encounter diagnosis was Hematochezia. Diagnoses of Left lower quadrant pain, Blood in stool, and History of pancreatitis were also pertinent to this visit.    PCP:   Joel Lantigua   No address on file    Referring MD:  Unknown Referring  No address on file    Initial History of Present Illness (HPI):  This is a 61 y.o. male here for evaluation of daily hematochezia going on for the last several months patient states he is a former patient of Dr. Gil Greenberg who no longer takes his insurance so he set up an appointment here because he is having intermittent hematochezia bright red blood for the last several months several times a week he say has a history of ulcerative colitis followed by Dr. Greenberg for maybe 20 years but he has never been on medication for it we do not have any copies of any was old medical records he did bring copies of his prior colonoscopy is he said his last colonoscopy was 5 years ago he has had colon polyps before his dad with colon cancer at 70 his dad's dad with colon cancer at 60 and his dad's brother with Crohn's disease.  He quit smoking many years ago he does drink alcohol last drank January 3, 2020 to he said maybe 10 years ago he had 1 episode of alcohol pancreatitis.  Never had his gallbladder out never had his colon resected not on any medicine for ulcerative colitis maybe lost about 8 lb over the last several months no dysphagia no odynophagia no early satiety no GERD symptoms no epigastric pain no right upper quadrant pain is been having left lower quadrant abdominal cramps he averages 1-2 bowel movements a day brown in color but red blood mixed with it no history of any abdominal trauma no fever no chills no shortness of breath no chest pain no dysuria urgency frequency or hematuria not taking any NSAIDs.  His abdominal pain is mild and crampy left lower quadrant.  Nothing seems to make  it better or worse no history of ischemic colitis.    Abdominal pain - as above  Reflux - no  Dysphagia - no   Bowel habits -as above  GI bleeding - as above  NSAID usage - none    Interval HPI 01/27/2022:  The patient's last visit with me was on Visit date not found.      ROS:  Constitutional: No fevers, chills, as above weight loss  ENT:  No heartburn no dysphagia no odynophagia no hoarseness  CV: No chest pain, no palpitation  Pulm: No cough, No shortness of breath, no wheezing.  Ophtho: No vision loss  GI: see HPI  Derm: No rash, no itching  Heme: No lymphadenopathy, No easy bruising  MSK: No significant arthritis  : No dysuria, No hematuria  Endo: No hot or cold intolerance  Neuro: No syncope, No seizure, no strokes  Psych: No uncontrolled anxiety but some anxiety, No uncontrolled depression    Medical History:  has a past medical history of Anxiety, GERD (gastroesophageal reflux disease), Glaucoma, History of alcohol abuse, Ulcerative colitis, and Ulcerative colitis.    Surgical History:  has a past surgical history that includes Cataract extraction, bilateral; Tonsillectomy; anal fistula; Colonoscopy (2016); Vitrectomy by pars plana approach (Left, 11/28/2018); Vitrectomy by pars plana approach (Right, 12/12/2018); and Cataract extraction w/  intraocular lens implant (Bilateral, n/a).    Family History: family history includes Cancer in his brother and sister; Cancer (age of onset: 75) in his father; Cataracts in his father and mother; Colon cancer (age of onset: 60) in his paternal grandfather; Colon cancer (age of onset: 70) in his father; Crohn's disease in his paternal uncle; Diabetes in his father; Glaucoma in his father and mother; Inflammatory bowel disease in his paternal uncle; Macular degeneration in his mother..     Social History:  reports that he quit smoking about 12 years ago. His smoking use included cigarettes. He has a 20.00 pack-year smoking history. He has quit using smokeless tobacco.  "He reports previous alcohol use of about 8.0 standard drinks of alcohol per week. He reports current drug use.    Review of patient's allergies indicates:  No Known Allergies    Medication List with Changes/Refills   Current Medications    ALBUTEROL (PROAIR HFA) 90 MCG/ACTUATION INHALER    Inhale 2 puffs into the lungs every 6 (six) hours as needed for Wheezing or Shortness of Breath. Rescue    ATORVASTATIN (LIPITOR) 40 MG TABLET    Take 1 tablet (40 mg total) by mouth every evening.    DORZOLAMIDE-TIMOLOL 2-0.5% (COSOPT) 22.3-6.8 MG/ML OPHTHALMIC SOLUTION    Place 1 drop into both eyes 2 (two) times daily.    ESOMEPRAZOLE (NEXIUM) 20 MG CAPSULE    Take 20 mg by mouth before breakfast.   Discontinued Medications    ALPRAZOLAM (XANAX) 1 MG TABLET    1 prn anxiety attack    THIAMINE 100 MG TABLET    Take 1 tablet (100 mg total) by mouth once daily.         Objective Findings:    Vital Signs:  BP (!) 140/78 (BP Location: Left arm, Patient Position: Sitting, BP Method: Medium (Automatic))   Pulse 64   Ht 5' 4" (1.626 m)   Wt 72.7 kg (160 lb 4.4 oz)   BMI 27.51 kg/m²   Body mass index is 27.51 kg/m².    Physical Exam:  Chaperone in the room Chris  General Appearance: Well appearing in no acute distress  Eyes:    No scleral icterus  ENT:  No lesions or masses, dentures   Lungs: CTA bilaterally, no wheezes, no rhonchi, no rales  Heart:  S1, S2 normal, no murmurs heard  Abdomen:  Non distended, soft, no guarding, no rebound, mild left lower quadrant tenderness, no appreciated ascites, no bruits, no hepatosplenomegaly,  No CVA tenderness, no appreciated hernias, no Thomas sign no McBurney point tenderness  Musculoskeletal:  No major joint deformities  Skin: No petechiae or rash on exposed skin areas  Neurologic:  Alert and oriented x4  Psychiatric:  Normal speech mentation and affect    Labs:  Lab Results   Component Value Date    WBC 5.48 08/03/2021    HGB 15.2 08/03/2021    HCT 44.2 08/03/2021     08/03/2021    " CHOL 224 (H) 08/03/2021    TRIG 107 08/03/2021    HDL 54 08/03/2021    ALT 20 08/03/2021    AST 22 08/03/2021     08/03/2021    K 4.3 08/03/2021     08/03/2021    CREATININE 1.0 08/03/2021    BUN 11 08/03/2021    CO2 27 08/03/2021    TSH 0.850 08/03/2021    PSA 2.2 08/03/2021    INR 0.9 09/30/2020    HGBA1C 5.2 08/03/2021               Medical Decision Making:  Lab work reviewed  Lab work talk given  Avoidance of alcohol talk given  CT scan talk given  Urgent colonoscopy talk given      Assessment:  1. Hematochezia    2. Left lower quadrant pain    3. Blood in stool    4. History of pancreatitis         Recommendations:  1. Lab work today  2. CT scan abdomen pelvis  3. Urgent colonoscopy for evaluation of hematochezia, personal history of colon polyps, first-degree relative dad with colon cancer at 70, dad's dad with colon cancer at 60. Patient's last colonoscopy thinks 5 years ago by Dr. Gil Greenberg,  4. Return to GI clinic 8 weeks sooner if symptoms worsen if significant GI bleeding patient needs to go the emergency room.    Follow up in about 8 weeks (around 3/24/2022).      Order summary:  Orders Placed This Encounter    CT Abdomen Pelvis W Wo Contrast    Comprehensive Metabolic Panel    CBC Auto Differential    Protime-INR    Iron and TIBC    Ferritin    Lipase    TISSUE TRANSGLUTAMINASE (TTG), IGA    IgA    Creatinine, serum    Case Request Endoscopy: COLONOSCOPY         Thank you so much for allowing me to participate in the care of Azam Collins MD

## 2022-01-27 NOTE — Clinical Note
Lab work today CT scan abdomen pelvis Please bring patient to the schedulers today to schedule urgent colonoscopy 1st provider available for hematochezia okay for CRS or GI Return GI clinic 8 weeks for follow-up

## 2022-01-28 ENCOUNTER — ANESTHESIA EVENT (OUTPATIENT)
Dept: ENDOSCOPY | Facility: HOSPITAL | Age: 62
End: 2022-01-28
Payer: COMMERCIAL

## 2022-01-28 ENCOUNTER — HOSPITAL ENCOUNTER (OUTPATIENT)
Facility: HOSPITAL | Age: 62
Discharge: HOME | End: 2022-01-28
Attending: INTERNAL MEDICINE | Admitting: INTERNAL MEDICINE
Payer: COMMERCIAL

## 2022-01-28 ENCOUNTER — ANESTHESIA (OUTPATIENT)
Dept: ENDOSCOPY | Facility: HOSPITAL | Age: 62
End: 2022-01-28
Payer: COMMERCIAL

## 2022-01-28 VITALS
BODY MASS INDEX: 27.31 KG/M2 | HEIGHT: 64 IN | TEMPERATURE: 98 F | HEART RATE: 51 BPM | SYSTOLIC BLOOD PRESSURE: 160 MMHG | RESPIRATION RATE: 16 BRPM | WEIGHT: 160 LBS | OXYGEN SATURATION: 98 % | DIASTOLIC BLOOD PRESSURE: 70 MMHG

## 2022-01-28 DIAGNOSIS — K92.1 HEMATOCHEZIA: ICD-10-CM

## 2022-01-28 DIAGNOSIS — K51.819 OTHER ULCERATIVE COLITIS WITH COMPLICATION: Primary | ICD-10-CM

## 2022-01-28 LAB
CTP QC/QA: YES
SARS-COV-2 AG RESP QL IA.RAPID: NEGATIVE

## 2022-01-28 PROCEDURE — 45380 PR COLONOSCOPY,BIOPSY: ICD-10-PCS | Mod: ,,, | Performed by: INTERNAL MEDICINE

## 2022-01-28 PROCEDURE — 25000003 PHARM REV CODE 250: Performed by: INTERNAL MEDICINE

## 2022-01-28 PROCEDURE — 88305 TISSUE EXAM BY PATHOLOGIST: CPT | Performed by: PATHOLOGY

## 2022-01-28 PROCEDURE — 88305 TISSUE EXAM BY PATHOLOGIST: CPT | Mod: 26,,, | Performed by: PATHOLOGY

## 2022-01-28 PROCEDURE — E9220 PRA ENDO ANESTHESIA: ICD-10-PCS | Mod: ,,, | Performed by: REGISTERED NURSE

## 2022-01-28 PROCEDURE — 25000003 PHARM REV CODE 250: Performed by: REGISTERED NURSE

## 2022-01-28 PROCEDURE — 63600175 PHARM REV CODE 636 W HCPCS: Performed by: REGISTERED NURSE

## 2022-01-28 PROCEDURE — 88305 TISSUE EXAM BY PATHOLOGIST: ICD-10-PCS | Mod: 26,,, | Performed by: PATHOLOGY

## 2022-01-28 PROCEDURE — E9220 PRA ENDO ANESTHESIA: HCPCS | Mod: ,,, | Performed by: REGISTERED NURSE

## 2022-01-28 PROCEDURE — 45380 COLONOSCOPY AND BIOPSY: CPT | Performed by: INTERNAL MEDICINE

## 2022-01-28 PROCEDURE — 45380 COLONOSCOPY AND BIOPSY: CPT | Mod: ,,, | Performed by: INTERNAL MEDICINE

## 2022-01-28 PROCEDURE — 37000009 HC ANESTHESIA EA ADD 15 MINS: Performed by: INTERNAL MEDICINE

## 2022-01-28 PROCEDURE — 27201012 HC FORCEPS, HOT/COLD, DISP: Performed by: INTERNAL MEDICINE

## 2022-01-28 PROCEDURE — 37000008 HC ANESTHESIA 1ST 15 MINUTES: Performed by: INTERNAL MEDICINE

## 2022-01-28 RX ORDER — LIDOCAINE HYDROCHLORIDE 20 MG/ML
INJECTION INTRAVENOUS
Status: DISCONTINUED | OUTPATIENT
Start: 2022-01-28 | End: 2022-01-28

## 2022-01-28 RX ORDER — MESALAMINE 1.2 G/1
4.8 TABLET, DELAYED RELEASE ORAL
Qty: 120 TABLET | Refills: 5 | Status: CANCELLED | OUTPATIENT
Start: 2022-01-28 | End: 2022-07-27

## 2022-01-28 RX ORDER — SODIUM CHLORIDE 9 MG/ML
INJECTION, SOLUTION INTRAVENOUS CONTINUOUS
Status: DISCONTINUED | OUTPATIENT
Start: 2022-01-28 | End: 2022-01-28 | Stop reason: HOSPADM

## 2022-01-28 RX ORDER — PROPOFOL 10 MG/ML
VIAL (ML) INTRAVENOUS
Status: DISCONTINUED | OUTPATIENT
Start: 2022-01-28 | End: 2022-01-28

## 2022-01-28 RX ORDER — MESALAMINE 0.38 G/1
1.5 CAPSULE, EXTENDED RELEASE ORAL DAILY
Qty: 360 CAPSULE | Refills: 1 | Status: SHIPPED | OUTPATIENT
Start: 2022-01-28 | End: 2022-02-01 | Stop reason: SDUPTHER

## 2022-01-28 RX ORDER — PROPOFOL 10 MG/ML
VIAL (ML) INTRAVENOUS CONTINUOUS PRN
Status: DISCONTINUED | OUTPATIENT
Start: 2022-01-28 | End: 2022-01-28

## 2022-01-28 RX ADMIN — SODIUM CHLORIDE: 0.9 INJECTION, SOLUTION INTRAVENOUS at 08:01

## 2022-01-28 RX ADMIN — SODIUM CHLORIDE: 0.9 INJECTION, SOLUTION INTRAVENOUS at 07:01

## 2022-01-28 RX ADMIN — PROPOFOL 80 MG: 10 INJECTION, EMULSION INTRAVENOUS at 08:01

## 2022-01-28 RX ADMIN — Medication 150 MCG/KG/MIN: at 08:01

## 2022-01-28 RX ADMIN — LIDOCAINE HYDROCHLORIDE 100 MG: 20 INJECTION, SOLUTION INTRAVENOUS at 08:01

## 2022-01-28 NOTE — ANESTHESIA PREPROCEDURE EVALUATION
01/28/2022  Azam Bowden Jr. is a 61 y.o., male.    Patient Active Problem List   Diagnosis    Ulcerative colitis    Dyspnea    Hx of tobacco use, presenting hazards to health    Posterior vitreous detachment, bilateral    Other vitreous opacities, bilateral    Other vitreous opacities, left eye    Other vitreous opacities, right eye    Glaucoma suspect of both eyes    Anxiety    GERD (gastroesophageal reflux disease)    Acute pain of right shoulder    Dyslipidemia    Elevated BP without diagnosis of hypertension    COPD (chronic obstructive pulmonary disease)     Past Medical History:   Diagnosis Date    Anxiety     GERD (gastroesophageal reflux disease)     Glaucoma     History of alcohol abuse     Ulcerative colitis     Ulcerative colitis      Past Surgical History:   Procedure Laterality Date    anal fistula      CATARACT EXTRACTION W/  INTRAOCULAR LENS IMPLANT Bilateral n/a    done outside [facility]    CATARACT EXTRACTION, BILATERAL      COLONOSCOPY  2016    ulcerative colitis    TONSILLECTOMY      VITRECTOMY BY PARS PLANA APPROACH Left 11/28/2018    Procedure: VITRECTOMY, PARS PLANA APPROACH;  Surgeon: DIANDRA Rushing MD;  Location: [facility] OR [address] [place] [address];  Service: Ophthalmology;  Laterality: Left;  20 min    VITRECTOMY BY PARS PLANA APPROACH Right 12/12/2018    Procedure: VITRECTOMY, PARS PLANA APPROACH;  Surgeon: DIANDRA Rushing MD;  Location: [facility] OR [address] [place] [address];  Service: Ophthalmology;  Laterality: Right;  20min         Anesthesia Evaluation    I have reviewed the Patient Summary Reports.   I have reviewed the NPO Status.   I have reviewed the Medications.     Review of Systems  Anesthesia Hx:  No problems with previous Anesthesia    Pulmonary:   COPD Shortness of breath    Hepatic/GI:   PUD, GERD        Physical Exam  General:  Well nourished    Airway/Jaw/Neck:  Airway  Findings: Mouth Opening: Normal Tongue: Normal  General Airway Assessment: Adult  Mallampati: II  TM Distance: Normal, at least 6 cm  Jaw/Neck Findings:     Neck ROM: Normal ROM                 Anesthesia Plan  Type of Anesthesia, risks & benefits discussed:  Anesthesia Type:  general, MAC    Patient's Preference:   Plan Factors:          Intra-op Monitoring Plan: standard ASA monitors  Intra-op Monitoring Plan Comments:   Post Op Pain Control Plan:   Post Op Pain Control Plan Comments:     Induction:   IV  Beta Blocker:  Patient is not currently on a Beta-Blocker (No further documentation required).       Informed Consent: Patient understands risks and agrees with Anesthesia plan.  Questions answered. Anesthesia consent signed with patient.  ASA Score: 2     Day of Surgery Review of History & Physical: I have interviewed and examined the patient. I have reviewed the patient's H&P dated:  There are no significant changes.          Ready For Surgery From Anesthesia Perspective.

## 2022-01-28 NOTE — ANESTHESIA POSTPROCEDURE EVALUATION
Anesthesia Post Evaluation    Patient: zAam Bowden Jr.    Procedure(s) Performed: Procedure(s) (LRB):  COLONOSCOPY (N/A)    Final Anesthesia Type: general      Patient location during evaluation: PACU  Patient participation: Yes- Able to Participate  Level of consciousness: awake and alert and oriented  Pain management: adequate  Airway patency: patent    PONV status at discharge: No PONV  Anesthetic complications: no      Cardiovascular status: blood pressure returned to baseline and hemodynamically stable  Respiratory status: unassisted  Hydration status: euvolemic  Follow-up not needed.          Vitals Value Taken Time   /70 01/28/22 0932   Temp 36.7 °C (98.1 °F) 01/28/22 0908   Pulse 51 01/28/22 0932   Resp 16 01/28/22 0932   SpO2 98 % 01/28/22 0932         Event Time   Out of Recovery 09:39:03         Pain/Krystian Score: Krystian Score: 10 (1/28/2022  9:23 AM)

## 2022-01-28 NOTE — INTERVAL H&P NOTE
The patient has been examined and the H&P has been reviewed:    I concur with the findings and no changes have occurred since H&P was written.    Procedure risks, benefits and alternative options discussed and understood by patient/family.  Urgent Colonoscopy for lower GI bleeding          There are no hospital problems to display for this patient.

## 2022-01-28 NOTE — TRANSFER OF CARE
"Anesthesia Transfer of Care Note    Patient: Azam Bowden Jr.    Procedure(s) Performed: Procedure(s) (LRB):  COLONOSCOPY (N/A)    Patient location: PACU    Anesthesia Type: general    Transport from OR: Transported from OR on 6-10 L/min O2 by face mask with adequate spontaneous ventilation    Post pain: adequate analgesia    Post assessment: no apparent anesthetic complications and tolerated procedure well    Post vital signs: stable    Level of consciousness: awake    Nausea/Vomiting: no nausea/vomiting    Complications: none    Transfer of care protocol was followed      Last vitals:   Visit Vitals  BP (!) 174/90 (BP Location: Left arm, Patient Position: Lying)   Pulse (!) 50   Temp 36.7 °C (98.1 °F) (Skin)   Resp 16   Ht 5' 4" (1.626 m)   Wt 72.6 kg (160 lb)   SpO2 100%   BMI 27.46 kg/m²     "

## 2022-01-28 NOTE — DISCHARGE INSTRUCTIONS
Patient Education       Colonoscopy   Why is this procedure done?   Colonoscopy is done so your doctor can see the inside of your large intestines, also called your colon, and your rectum. It uses a lighted tube called a scope, which has a tiny camera that can be moved through the large intestine. This may be done to:  · Check for colon cancer or growths called polyps  · Look for the source of rectal bleeding  · Find the cause of changes in your bowel movements  · Find the cause of belly or rectal pain  · Check results from other tests  · Check your response to treatment for other diseases  · Learn about weight loss  What happens before the procedure?   · Your doctor will ask you about your health history and do an exam. The doctor may order tests for your stool. Talk to the doctor about  ? All the drugs you are taking. Be sure to include all prescription and over-the-counter (OTC) drugs, and herbal supplements. Tell the doctor about any drug allergy. Bring a list of drugs you take with you.  ? Any bleeding problems. Be sure to tell your doctor if you are taking any drugs that may cause bleeding. Some of these are warfarin, rivaroxaban, apixaban, ticagrelor, clopidogrel, ketorolac, ibuprofen, naproxen, or aspirin. Certain vitamins and herbs, such as garlic and fish oil, may also add to the risk for bleeding. You may need to stop these drugs as well. Talk to your doctor about them.  · The colon needs to be cleaned out before this test. Your doctor will tell you to take drugs that will cause watery loose stools. These may be liquids, pills, or both. You may need to take these the day before and the day of your test.  · You will be placed on a clear liquid diet the day before the exam and you will need to only have clear liquids until the test is done. Clear liquids include water, sports drinks, broth, soft drinks, and juices, but avoid anything that is red or purple in color. Do not drink alcohol.  · Your doctor may  ask you not to eat or drink any food other than the drugs or liquids that clean out your colon.  · You will not be allowed to drive after the procedure. Ask a family member or a friend to help you get home and stay with you if possible.  What happens during the procedure?   · The staff will put an IV in your arm to give you fluids and drugs. You may be given a drug to make you sleepy.  · You will lie on your side with your knees bent and pulled up toward your chest.  · The doctor will use a small thin tube, called a scope, with a light and a camera on it. The tube is put into your anus and moved through your rectum and into the large intestine or colon.  · Small amounts of air are put into your colon. The camera lets your doctor look at the lining of your colon.  · Your doctor may take small tissue samples and remove small growths.     · The tube is then taken out.  · The procedure may take 30 to 45 minutes.  What happens after the procedure?   · You will go to a recovery area and the staff will watch you closely.  · You will want to rest after your procedure.  · You may feel groggy.  · You should be able to eat your usual diet after the test.  · You will have gas and you may have mild cramping. This is normal.  · A small amount of bleeding may happen during the first few days after your procedure.  · If tissue was removed, it will be sent to a lab to be checked. Your doctor will tell you the results after a week or two.  What problems could happen?   · Tear inside your colon  · Bleeding can happen for a few days afterwards  Where can I learn more?   American Cancer Society  https://www.cancer.org/cancer/colon-rectal-cancer/jmjvrxhcq-xtjanjpzl-frpekon/xyzyktrfx-pdyhn-hjpq.html   American Society of Clinical Oncology  https://www.cancer.net/navigating-cancer-care/diagnosing-cancer/tests-and-procedures/colonoscopy   Last Reviewed Date   2021-03-10  Consumer Information Use and Disclaimer   This information is not  specific medical advice and does not replace information you receive from your health care provider. This is only a brief summary of general information. It does NOT include all information about conditions, illnesses, injuries, tests, procedures, treatments, therapies, discharge instructions or life-style choices that may apply to you. You must talk with your health care provider for complete information about your health and treatment options. This information should not be used to decide whether or not to accept your health care providers advice, instructions or recommendations. Only your health care provider has the knowledge and training to provide advice that is right for you.  Copyright   Copyright © 2021 [employer] and its affiliates and/or licensors. All rights reserved.

## 2022-01-28 NOTE — PROVATION PATIENT INSTRUCTIONS
Discharge Summary/Instructions after an Endoscopic Procedure  Patient Name: Azam Bowden  Patient MRN: [id number]  Patient YOB: 1960  Friday, January 28, 2022  Philip Collins MD  Dear patient,  As a result of recent federal legislation (The Federal Cures Act), you may   receive lab or pathology results from your procedure in your [facility]   account before your physician is able to contact you. Your physician or   their representative will relay the results to you with their   recommendations at their soonest availability.  Thank you,  RESTRICTIONS:  During your procedure today, you received medications for sedation.  These   medications may affect your judgment, balance and coordination.  Therefore,   for 24 hours, you have the following restrictions:   - DO NOT drive a car, operate machinery, make legal/financial decisions,   sign important papers or drink alcohol.    ACTIVITY:  Today: no heavy lifting, straining or running due to procedural   sedation/anesthesia.  The following day: return to full activity including work.  DIET:  Eat and drink normally unless instructed otherwise.     TREATMENT FOR COMMON SIDE EFFECTS:  - Mild abdominal pain, nausea, belching, bloating or excessive gas:  rest,   eat lightly and use a heating pad.  - Sore Throat: treat with throat lozenges and/or gargle with warm salt   water.  - Because air was used during the procedure, expelling large amounts of air   from your rectum or belching is normal.  - If a bowel prep was taken, you may not have a bowel movement for 1-3 days.    This is normal.  SYMPTOMS TO WATCH FOR AND REPORT TO YOUR PHYSICIAN:  1. Abdominal pain or bloating, other than gas cramps.  2. Chest pain.  3. Back pain.  4. Signs of infection such as: chills or fever occurring within 24 hours   after the procedure.  5. Rectal bleeding, which would show as bright red, maroon, or black stools.   (A tablespoon of blood from the rectum is not serious, especially  if   hemorrhoids are present.)  6. Vomiting.  7. Weakness or dizziness.  GO DIRECTLY TO THE NEAREST EMERGENCY ROOM IF YOU HAVE ANY OF THE FOLLOWING:      Difficulty breathing              Chills and/or fever over 101 F   Persistent vomiting and/or vomiting blood   Severe abdominal pain   Severe chest pain   Black, tarry stools   Bleeding- more than one tablespoon   Any other symptom or condition that you feel may need urgent attention  Your doctor recommends these additional instructions:  If any biopsies were taken, your doctors clinic will contact you in 1 to 2   weeks with any results.  - Patient has a contact number available for emergencies.  The signs and   symptoms of potential delayed complications were discussed with the   patient.  Return to normal activities tomorrow.  Written discharge   instructions were provided to the patient.   - Resume previous diet.   - Continue present medications.   - Await pathology results.   - Discharge patient to home.   - Use Apriso 0.375 g 4 caps PO QAM.   - Return to GI clinic.   - Repeat colonoscopy in 6 months for surveillance.   - Telephone endoscopist for pathology results in 3 weeks.   - The findings and recommendations were discussed with the patient.   - Discharge patient to home.  For questions, problems or results please call your physician - Philip Collins MD at Work:  [phone].  [facility], EMERGENCY ROOM PHONE NUMBER: [phone]  IF A COMPLICATION OR EMERGENCY SITUATION ARISES AND YOU ARE UNABLE TO REACH   YOUR PHYSICIAN - GO DIRECTLY TO THE EMERGENCY ROOM.  Philip Collins MD  1/28/2022 9:30:02 AM  This report has been verified and signed electronically.  Dear patient,  As a result of recent federal legislation (The Federal Cures Act), you may   receive lab or pathology results from your procedure in your [facility]   account before your physician is able to contact you. Your physician or   their representative will relay the results  to you with their   recommendations at their soonest availability.  Thank you,  PROVATION

## 2022-01-31 ENCOUNTER — PATIENT MESSAGE (OUTPATIENT)
Dept: GASTROENTEROLOGY | Facility: CLINIC | Age: 62
End: 2022-01-31
Payer: COMMERCIAL

## 2022-01-31 DIAGNOSIS — K51.819 OTHER ULCERATIVE COLITIS WITH COMPLICATION: ICD-10-CM

## 2022-01-31 LAB — TTG IGA SER-ACNC: 7 UNITS

## 2022-02-01 ENCOUNTER — TELEPHONE (OUTPATIENT)
Dept: OPHTHALMOLOGY | Facility: CLINIC | Age: 62
End: 2022-02-01
Payer: COMMERCIAL

## 2022-02-01 LAB
FINAL PATHOLOGIC DIAGNOSIS: NORMAL
GROSS: NORMAL
Lab: NORMAL

## 2022-02-01 RX ORDER — MESALAMINE 0.38 G/1
1.5 CAPSULE, EXTENDED RELEASE ORAL DAILY
Qty: 360 CAPSULE | Refills: 1 | Status: SHIPPED | OUTPATIENT
Start: 2022-02-01 | End: 2022-07-31

## 2022-02-01 NOTE — TELEPHONE ENCOUNTER
----- Message from Laurita Gutierrez sent at 2/1/2022 10:10 AM CST -----  Contact: Miah Bowden  Patient is needing the price of eyelid surgery. Patient call back number is [phone].

## 2022-02-02 NOTE — PROGRESS NOTES
EXT PIX DONE OU     PTOSIS VF DONE OU     REL & FIX =   GOOD OU       COOP =     GOOD     PATIENT DENIES ANY ALLERGIES TO LATEX OR ADHESIVES AT THIS TIME      JTHOMAS

## 2022-02-07 DIAGNOSIS — K62.89 PROCTITIS: Primary | ICD-10-CM

## 2022-02-07 DIAGNOSIS — K51.311 ULCERATIVE RECTOSIGMOIDITIS WITH RECTAL BLEEDING: ICD-10-CM

## 2022-02-07 RX ORDER — MESALAMINE 1000 MG/1
1000 SUPPOSITORY RECTAL NIGHTLY
Qty: 30 SUPPOSITORY | Refills: 5 | Status: SHIPPED | OUTPATIENT
Start: 2022-02-07 | End: 2023-07-11

## 2022-02-07 NOTE — PROGRESS NOTES
Chris please schedule patient for follow-up GI clinic appointment with me for left-sided ulcerative colitis in the next few months.  Okay for telemedicine video visit    I spoke with patient on the phone today about his colon pathology results will add Canasa suppositories once nightly in addition to his oral mesalamine.     1. Terminal ileum, biopsy:   - Ileal mucosa with no diagnostic histopathologic alterations   - Negative for active inflammation   2. Colon, right, biopsy:   - Colonic mucosa with no diagnostic histopathologic alterations   - Negative for active inflammation and microscopic colitis   3. Colon, left, biopsy:   - Mild active chronic colitis   - Negative for dysplasia   4. Rectum, biopsy:   - Mild active chronic proctitis   - Negative for dysplasia   Comment: Interp By Eliseo Yap MD, Signed on 02/01/2022

## 2022-02-08 ENCOUNTER — TELEPHONE (OUTPATIENT)
Dept: OPHTHALMOLOGY | Facility: CLINIC | Age: 62
End: 2022-02-08
Payer: COMMERCIAL

## 2022-02-08 NOTE — TELEPHONE ENCOUNTER
----- Message from Laurita Gutierrez sent at 2/8/2022  4:00 PM CST -----  Contact: Azam Bowden  Patient is needing to speak with you. Patient call back number is [phone].    Patient will decide if he wants to pay or if he wants to wait. [employer] [facility] WILL NOT cover this procedure.     Phyllis Rosales

## 2022-02-09 ENCOUNTER — OFFICE VISIT (OUTPATIENT)
Dept: PSYCHIATRY | Facility: CLINIC | Age: 62
End: 2022-02-09
Payer: COMMERCIAL

## 2022-02-09 DIAGNOSIS — F10.20 ALCOHOL USE DISORDER, MODERATE, DEPENDENCE: ICD-10-CM

## 2022-02-09 DIAGNOSIS — F32.A DEPRESSION, UNSPECIFIED DEPRESSION TYPE: ICD-10-CM

## 2022-02-09 DIAGNOSIS — F41.1 GENERALIZED ANXIETY DISORDER: Primary | ICD-10-CM

## 2022-02-09 PROCEDURE — 90837 PR PSYCHOTHERAPY W/PATIENT, 60 MIN: ICD-10-PCS | Mod: S$GLB,,, | Performed by: SOCIAL WORKER

## 2022-02-09 PROCEDURE — 90837 PSYTX W PT 60 MINUTES: CPT | Mod: S$GLB,,, | Performed by: SOCIAL WORKER

## 2022-02-10 NOTE — PROGRESS NOTES
Individual Psychotherapy (PhD/LCSW)    2/9/2022    Site:  [facility]         Therapeutic Intervention: Met with patient.  Outpatient - Insight oriented psychotherapy 60 min - CPT code 54430, Outpatient - Behavior modifying psychotherapy 60 min - CPT code 68022 and Outpatient - Supportive psychotherapy 60 min - CPT Code 48649    Chief complaint/reason for encounter: depression, anxiety, sleep, behavior and interpersonal     Interval history and content of current session: Pt presents on time to scheduled individual session. Last seen for therapy one month ago. He presents with dysthymic mood, difficulty with behavioral activation for depressive symptoms. Pt was not able to complete assignment from last session. Explored barriers and utilized motivational interviewing techniques.  Again revisited potential to meet with psychiatrist for medication evaluation- he is not open to the idea currently. Pt is considering joining a dance class with wife. He previously was an extra in movies. Pt's affect was brighter when discussing this- able to recognize he enjoyed the experience. Encouraged him to make next steps to reengage in volunteering on movie sets. Pt reports main stressor continues to be 17yo son. Son has appointment with psychiatrist today, which he plans to join after our session. He is hopeful this will be helpful with family dynamic. Pt has been monitoring alcohol consumption. He stopped daily use on Jan 3rd- however he did start drinking periodically with dinner. Denies this is every day. Encourage him to continue to be mindful of use, provided education around addictive qualities. Plan to continue with sessions as scheduled.     Treatment plan:  · Target symptoms: alcohol abuse, depression, anxiety , substance abuse, adjustment  · Why chosen therapy is appropriate versus another modality: relevant to diagnosis, patient responds to this modality, evidence based practice  · Outcome monitoring methods:  self-report, observation  · Therapeutic intervention type: insight oriented psychotherapy, behavior modifying psychotherapy, supportive psychotherapy    Risk parameters:  Patient reports no suicidal ideation  Patient reports no homicidal ideation  Patient reports no self-injurious behavior  Patient reports no violent behavior    Verbal deficits: None    Patient's response to intervention:  The patient's response to intervention is accepting.    Progress toward goals and other mental status changes:  The patient's progress toward goals is fair - progressing slowly.     Diagnosis:     ICD-10-CM ICD-9-CM   1. Generalized anxiety disorder  F41.1 300.02   2. Depression, unspecified depression type  F32.A 311   3. Alcohol use disorder, moderate, dependence  F10.20 303.90       Plan:  individual psychotherapy, consult psychiatrist for medication evaluation and abstinence    Return to clinic: 2 weeks, as scheduled    Length of Service (minutes): 60

## 2022-02-11 ENCOUNTER — PATIENT MESSAGE (OUTPATIENT)
Dept: GASTROENTEROLOGY | Facility: CLINIC | Age: 62
End: 2022-02-11
Payer: COMMERCIAL

## 2022-02-13 NOTE — PROGRESS NOTES
HPI     DLS: 4/30/2019    Pt here for Overdue Glaucoma Check;  Pt states at times he would notice a floater with a halo believes its his   OD. Pt states he does forgot to use the eye drop at night sometimes.     Meds;   Dorzolamide-Timolol BID OU    1. Glaucoma Suspect / OHT   2. PCIOL OU     Last edited by Amparo Worthy on 2/14/2022  8:12 AM. (History)            Assessment /Plan     For exam results, see Encounter Report.    Primary open-angle glaucoma, bilateral, mild stage    Pseudophakia of both eyes    H/O vitrectomy        Lost to F/U from glaucoma clinic from 4/2019 to 2/2022 (3 years)   Has seen Dr Michel (2021) and Dr neumann (2022)      1.   Glaucoma (type and duration)   POAG / mild ou    First HVF   2019    First photos   2019   Treatment / Drops started   Cosopt BID OU - 2019            Family history    +father        Glaucoma meds    Cosopt BID ou         H/O adverse rxn to glaucoma drops    none        LASERS    none        GLAUCOMA SURGERIES    none        OTHER EYE SURGERIES  -  // PC IOL ou - multifocal - ? Where or when     PPVx   OU --s/p 25g PPV/partial AFx OS (11/28/18) --s/p 25g PPV/partial AFx OD (12/12/18) - For PVD w/ floaters ou          CDR    0.5/0.6 (sometimes called 0.3/0.4)         Tbase    16-28 // 16-35         Tmax    28/35            Ttarget    ?? About 21 ou            HVF    1 test 2019 to 2019 - early SAD od // early SAD os        Gonio    +3/+3        CCT    554/560        OCT    1 test 2019 to 2019 - RNFL - full od // dec TS os        HRT    / test 20/ to 20/ - MR - / od // / os        Disc photos    2019    - Ttoday    17/17  - Test done today    Gonio / IOP / re-establish care     2. H/O PPVx ou    For severe floaters - ou - [place]     3. Pseudophakia   Multifocal ou    Monitor    4. Ptosis - vis sign by walt RODRIGUEZ's    nel was willing to operate - but his insurance - blue cross / blue shield - says no , and so it is not covered by his insurance (2/2022)          PLAN  CSM   Encourage bid dosing of cosopt   F/U 3-4 months with HVF / DFE / OCT - appt made before pt left

## 2022-02-14 ENCOUNTER — OFFICE VISIT (OUTPATIENT)
Dept: OPHTHALMOLOGY | Facility: CLINIC | Age: 62
End: 2022-02-14
Payer: COMMERCIAL

## 2022-02-14 DIAGNOSIS — Z98.890 H/O VITRECTOMY: ICD-10-CM

## 2022-02-14 DIAGNOSIS — H40.1131 PRIMARY OPEN-ANGLE GLAUCOMA, BILATERAL, MILD STAGE: Primary | ICD-10-CM

## 2022-02-14 DIAGNOSIS — H40.003 OPEN ANGLE GLAUCOMA WITH BORDERLINE INTRAOCULAR PRESSURE, BILATERAL: ICD-10-CM

## 2022-02-14 DIAGNOSIS — Z96.1 PSEUDOPHAKIA OF BOTH EYES: ICD-10-CM

## 2022-02-14 PROCEDURE — 99999 PR PBB SHADOW E&M-EST. PATIENT-LVL II: CPT | Mod: PBBFAC,,, | Performed by: OPHTHALMOLOGY

## 2022-02-14 PROCEDURE — 92014 PR EYE EXAM, EST PATIENT,COMPREHESV: ICD-10-PCS | Mod: S$GLB,,, | Performed by: OPHTHALMOLOGY

## 2022-02-14 PROCEDURE — 92020 GONIOSCOPY: CPT | Mod: S$GLB,,, | Performed by: OPHTHALMOLOGY

## 2022-02-14 PROCEDURE — 99999 PR PBB SHADOW E&M-EST. PATIENT-LVL II: ICD-10-PCS | Mod: PBBFAC,,, | Performed by: OPHTHALMOLOGY

## 2022-02-14 PROCEDURE — 1160F RVW MEDS BY RX/DR IN RCRD: CPT | Mod: CPTII,S$GLB,, | Performed by: OPHTHALMOLOGY

## 2022-02-14 PROCEDURE — 1160F PR REVIEW ALL MEDS BY PRESCRIBER/CLIN PHARMACIST DOCUMENTED: ICD-10-PCS | Mod: CPTII,S$GLB,, | Performed by: OPHTHALMOLOGY

## 2022-02-14 PROCEDURE — 1159F PR MEDICATION LIST DOCUMENTED IN MEDICAL RECORD: ICD-10-PCS | Mod: CPTII,S$GLB,, | Performed by: OPHTHALMOLOGY

## 2022-02-14 PROCEDURE — 1159F MED LIST DOCD IN RCRD: CPT | Mod: CPTII,S$GLB,, | Performed by: OPHTHALMOLOGY

## 2022-02-14 PROCEDURE — 92020 PR SPECIAL EYE EVAL,GONIOSCOPY: ICD-10-PCS | Mod: S$GLB,,, | Performed by: OPHTHALMOLOGY

## 2022-02-14 PROCEDURE — 92014 COMPRE OPH EXAM EST PT 1/>: CPT | Mod: S$GLB,,, | Performed by: OPHTHALMOLOGY

## 2022-02-14 RX ORDER — DORZOLAMIDE HYDROCHLORIDE AND TIMOLOL MALEATE 20; 5 MG/ML; MG/ML
1 SOLUTION/ DROPS OPHTHALMIC 2 TIMES DAILY
Qty: 10 ML | Refills: 12 | Status: SHIPPED | OUTPATIENT
Start: 2022-02-14 | End: 2022-08-01

## 2022-02-23 ENCOUNTER — OFFICE VISIT (OUTPATIENT)
Dept: PSYCHIATRY | Facility: CLINIC | Age: 62
End: 2022-02-23
Payer: COMMERCIAL

## 2022-02-23 DIAGNOSIS — F41.1 GENERALIZED ANXIETY DISORDER: Primary | ICD-10-CM

## 2022-02-23 DIAGNOSIS — F32.A DEPRESSION, UNSPECIFIED DEPRESSION TYPE: ICD-10-CM

## 2022-02-23 DIAGNOSIS — F10.20 ALCOHOL USE DISORDER, MODERATE, DEPENDENCE: ICD-10-CM

## 2022-02-23 PROCEDURE — 90837 PSYTX W PT 60 MINUTES: CPT | Mod: S$GLB,,, | Performed by: SOCIAL WORKER

## 2022-02-23 PROCEDURE — 90837 PR PSYCHOTHERAPY W/PATIENT, 60 MIN: ICD-10-PCS | Mod: S$GLB,,, | Performed by: SOCIAL WORKER
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
## 2022-03-09 ENCOUNTER — OFFICE VISIT (OUTPATIENT)
Dept: PSYCHIATRY | Facility: CLINIC | Age: 62
End: 2022-03-09
Payer: COMMERCIAL

## 2022-03-09 DIAGNOSIS — F10.20 ALCOHOL USE DISORDER, MODERATE, DEPENDENCE: ICD-10-CM

## 2022-03-09 DIAGNOSIS — F32.A DEPRESSION, UNSPECIFIED DEPRESSION TYPE: ICD-10-CM

## 2022-03-09 DIAGNOSIS — F41.1 GENERALIZED ANXIETY DISORDER: Primary | ICD-10-CM

## 2022-03-09 PROCEDURE — 90837 PSYTX W PT 60 MINUTES: CPT | Mod: S$GLB,,, | Performed by: SOCIAL WORKER

## 2022-03-09 PROCEDURE — 90837 PR PSYCHOTHERAPY W/PATIENT, 60 MIN: ICD-10-PCS | Mod: S$GLB,,, | Performed by: SOCIAL WORKER

## 2022-03-11 NOTE — PROGRESS NOTES
"Individual Psychotherapy (PhD/LCSW)    3/9/2022    Site:  [facility]         Therapeutic Intervention: Met with patient.  Outpatient - Insight oriented psychotherapy 60 min - CPT code 73878, Outpatient - Behavior modifying psychotherapy 60 min - CPT code 47478 and Outpatient - Supportive psychotherapy 60 min - CPT Code 45197    Chief complaint/reason for encounter: depression, anxiety, sleep, behavior and interpersonal     Interval history and content of current session: Pt presents on time to scheduled individual session. Last seen for therapy two weeks ago. He presents with ongoing dysthymic mood, difficulty with behavioral activation for depressive symptoms. Pt did not follow up on scheduling initial appointment for medication management. Utilized MI to discuss goals for treatment, potential to feel better. Pt is fearful of medications- encouraged him to discuss with provider. Pt currently reports drinking "maybe 3,4 or 5 beers at night." Pt notes alcohol use increased 2/2 Jcarlos Gras. Again revisited IOP treatment- stressed importance of sobriety to overall mood. Pt reports he has been speaking with wife around moving- both are interested, would like more space and animals. Discussed breaking down larger goals to smaller tasks. Pt notes mornings to be most difficult time for anxiety- discussed creating new routines, journaling and walking. Plan to continue with sessions as scheduled.     Treatment plan:  · Target symptoms: alcohol abuse, depression, anxiety , substance abuse, adjustment  · Why chosen therapy is appropriate versus another modality: relevant to diagnosis, patient responds to this modality, evidence based practice  · Outcome monitoring methods: self-report, observation  · Therapeutic intervention type: insight oriented psychotherapy, behavior modifying psychotherapy, supportive psychotherapy    Risk parameters:  Patient reports no suicidal ideation  Patient reports no homicidal " ideation  Patient reports no self-injurious behavior  Patient reports no violent behavior    Verbal deficits: None    Patient's response to intervention:  The patient's response to intervention is reluctant.    Progress toward goals and other mental status changes:  The patient's progress toward goals is limited.    Diagnosis:     ICD-10-CM ICD-9-CM   1. Generalized anxiety disorder  F41.1 300.02   2. Alcohol use disorder, moderate, dependence  F10.20 303.90   3. Depression, unspecified depression type  F32.A 311       Plan:  individual psychotherapy, consult psychiatrist for medication evaluation and abstinence- gave referral information for ABU program.     Return to clinic: 2 weeks, as scheduled    Length of Service (minutes): 60

## 2022-03-23 ENCOUNTER — OFFICE VISIT (OUTPATIENT)
Dept: PSYCHIATRY | Facility: CLINIC | Age: 62
End: 2022-03-23
Payer: COMMERCIAL

## 2022-03-23 DIAGNOSIS — F32.A DEPRESSION, UNSPECIFIED DEPRESSION TYPE: ICD-10-CM

## 2022-03-23 DIAGNOSIS — F41.1 GENERALIZED ANXIETY DISORDER: Primary | ICD-10-CM

## 2022-03-23 DIAGNOSIS — F10.20 ALCOHOL USE DISORDER, MODERATE, DEPENDENCE: ICD-10-CM

## 2022-03-23 PROCEDURE — 90837 PSYTX W PT 60 MINUTES: CPT | Mod: S$GLB,,, | Performed by: SOCIAL WORKER

## 2022-03-23 PROCEDURE — 90837 PR PSYCHOTHERAPY W/PATIENT, 60 MIN: ICD-10-PCS | Mod: S$GLB,,, | Performed by: SOCIAL WORKER

## 2022-03-25 NOTE — PROGRESS NOTES
"Individual Psychotherapy (PhD/LCSW)    3/23/2022    Site:  [facility]         Therapeutic Intervention: Met with patient.  Outpatient - Insight oriented psychotherapy 60 min - CPT code 34789, Outpatient - Behavior modifying psychotherapy 60 min - CPT code 84461 and Outpatient - Supportive psychotherapy 60 min - CPT Code 19570    Chief complaint/reason for encounter: depression, anxiety, sleep, behavior and interpersonal     Interval history and content of current session: Pt presents on time to scheduled individual session. Last seen for therapy two weeks ago. He presents with ongoing dysthymic mood, difficulty with behavioral activation for depressive symptoms. Pt has scheduled appointment for medication evaluation, though feeling very nervous about potential side effects. Encouraged him to keep appointment and discuss concerns. Pt reports he has been drinking "a little more," admits to 7-8 beers last night. Discussed alcohol dependence, provided education around impact on mood and sleep. Pt reports he can commit to cutting back to 2-3 beers tonight. Pt will be attending wedding this weekend- hopes to do paced drinking. Utilized motivational interviewing, revisited past failed trials at harm reduction. Pt appears to be in contemplation stage of change. Plan to continue with sessions as scheduled.     Treatment plan:  · Target symptoms: alcohol abuse, depression, anxiety , substance abuse, adjustment  · Why chosen therapy is appropriate versus another modality: relevant to diagnosis, patient responds to this modality, evidence based practice  · Outcome monitoring methods: self-report, observation  · Therapeutic intervention type: insight oriented psychotherapy, behavior modifying psychotherapy, supportive psychotherapy    Risk parameters:  Patient reports no suicidal ideation  Patient reports no homicidal ideation  Patient reports no self-injurious behavior  Patient reports no violent behavior    Verbal " deficits: None    Patient's response to intervention:  The patient's response to intervention is reluctant.    Progress toward goals and other mental status changes:  The patient's progress toward goals is limited.    Diagnosis:     ICD-10-CM ICD-9-CM   1. Generalized anxiety disorder  F41.1 300.02   2. Alcohol use disorder, moderate, dependence  F10.20 303.90   3. Depression, unspecified depression type  F32.A 311       Plan:  individual psychotherapy, consult psychiatrist for medication evaluation and abstinence- gave referral information for ABU program.     Return to clinic: 2 weeks, as scheduled    Length of Service (minutes): 60

## 2022-04-08 ENCOUNTER — OFFICE VISIT (OUTPATIENT)
Dept: PSYCHIATRY | Facility: CLINIC | Age: 62
End: 2022-04-08
Payer: COMMERCIAL

## 2022-04-08 VITALS
WEIGHT: 160.5 LBS | SYSTOLIC BLOOD PRESSURE: 132 MMHG | HEART RATE: 62 BPM | BODY MASS INDEX: 27.55 KG/M2 | DIASTOLIC BLOOD PRESSURE: 63 MMHG

## 2022-04-08 DIAGNOSIS — F32.A DEPRESSION, UNSPECIFIED DEPRESSION TYPE: ICD-10-CM

## 2022-04-08 DIAGNOSIS — F41.1 GENERALIZED ANXIETY DISORDER: Primary | ICD-10-CM

## 2022-04-08 DIAGNOSIS — F10.20 ALCOHOL USE DISORDER, MODERATE, DEPENDENCE: ICD-10-CM

## 2022-04-08 PROCEDURE — 99999 PR PBB SHADOW E&M-EST. PATIENT-LVL III: ICD-10-PCS | Mod: PBBFAC,,, | Performed by: PHYSICIAN ASSISTANT

## 2022-04-08 PROCEDURE — 1159F PR MEDICATION LIST DOCUMENTED IN MEDICAL RECORD: ICD-10-PCS | Mod: CPTII,S$GLB,, | Performed by: PHYSICIAN ASSISTANT

## 2022-04-08 PROCEDURE — 3078F DIAST BP <80 MM HG: CPT | Mod: CPTII,S$GLB,, | Performed by: PHYSICIAN ASSISTANT

## 2022-04-08 PROCEDURE — 1159F MED LIST DOCD IN RCRD: CPT | Mod: CPTII,S$GLB,, | Performed by: PHYSICIAN ASSISTANT

## 2022-04-08 PROCEDURE — 3075F PR MOST RECENT SYSTOLIC BLOOD PRESS GE 130-139MM HG: ICD-10-PCS | Mod: CPTII,S$GLB,, | Performed by: PHYSICIAN ASSISTANT

## 2022-04-08 PROCEDURE — 1160F RVW MEDS BY RX/DR IN RCRD: CPT | Mod: CPTII,S$GLB,, | Performed by: PHYSICIAN ASSISTANT

## 2022-04-08 PROCEDURE — 99215 OFFICE O/P EST HI 40 MIN: CPT | Mod: S$GLB,,, | Performed by: PHYSICIAN ASSISTANT

## 2022-04-08 PROCEDURE — 99999 PR PBB SHADOW E&M-EST. PATIENT-LVL III: CPT | Mod: PBBFAC,,, | Performed by: PHYSICIAN ASSISTANT

## 2022-04-08 PROCEDURE — 3075F SYST BP GE 130 - 139MM HG: CPT | Mod: CPTII,S$GLB,, | Performed by: PHYSICIAN ASSISTANT

## 2022-04-08 PROCEDURE — 3078F PR MOST RECENT DIASTOLIC BLOOD PRESSURE < 80 MM HG: ICD-10-PCS | Mod: CPTII,S$GLB,, | Performed by: PHYSICIAN ASSISTANT

## 2022-04-08 PROCEDURE — 1160F PR REVIEW ALL MEDS BY PRESCRIBER/CLIN PHARMACIST DOCUMENTED: ICD-10-PCS | Mod: CPTII,S$GLB,, | Performed by: PHYSICIAN ASSISTANT

## 2022-04-08 PROCEDURE — 3008F BODY MASS INDEX DOCD: CPT | Mod: CPTII,S$GLB,, | Performed by: PHYSICIAN ASSISTANT

## 2022-04-08 PROCEDURE — 99215 PR OFFICE/OUTPT VISIT, EST, LEVL V, 40-54 MIN: ICD-10-PCS | Mod: S$GLB,,, | Performed by: PHYSICIAN ASSISTANT

## 2022-04-08 PROCEDURE — 3008F PR BODY MASS INDEX (BMI) DOCUMENTED: ICD-10-PCS | Mod: CPTII,S$GLB,, | Performed by: PHYSICIAN ASSISTANT

## 2022-04-08 RX ORDER — ESCITALOPRAM OXALATE 10 MG/1
10 TABLET ORAL DAILY
Qty: 30 TABLET | Refills: 1 | Status: SHIPPED | OUTPATIENT
Start: 2022-04-08 | End: 2022-05-02

## 2022-04-08 RX ORDER — HYDROXYZINE PAMOATE 25 MG/1
25 CAPSULE ORAL
Qty: 30 CAPSULE | Refills: 1 | Status: SHIPPED | OUTPATIENT
Start: 2022-04-08 | End: 2022-05-02
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
## 2022-04-20 ENCOUNTER — OFFICE VISIT (OUTPATIENT)
Dept: PSYCHIATRY | Facility: CLINIC | Age: 62
End: 2022-04-20
Payer: COMMERCIAL

## 2022-04-20 DIAGNOSIS — F10.20 ALCOHOL USE DISORDER, MODERATE, DEPENDENCE: ICD-10-CM

## 2022-04-20 DIAGNOSIS — F41.1 GAD (GENERALIZED ANXIETY DISORDER): Primary | ICD-10-CM

## 2022-04-20 DIAGNOSIS — F32.A DEPRESSION, UNSPECIFIED DEPRESSION TYPE: ICD-10-CM

## 2022-04-20 PROCEDURE — 90837 PR PSYCHOTHERAPY W/PATIENT, 60 MIN: ICD-10-PCS | Mod: S$GLB,,, | Performed by: SOCIAL WORKER

## 2022-04-20 PROCEDURE — 90837 PSYTX W PT 60 MINUTES: CPT | Mod: S$GLB,,, | Performed by: SOCIAL WORKER

## 2022-04-25 NOTE — PROGRESS NOTES
"Individual Psychotherapy (PhD/LCSW)    4/20/2022    Site:  [facility]         Therapeutic Intervention: Met with patient.  Outpatient - Insight oriented psychotherapy 60 min - CPT code 84231, Outpatient - Behavior modifying psychotherapy 60 min - CPT code 06410 and Outpatient - Supportive psychotherapy 60 min - CPT Code 30865    Chief complaint/reason for encounter: depression, anxiety, sleep, behavior and interpersonal     Interval history and content of current session: Pt presents on time to scheduled individual session. Last seen for therapy one month ago. Since last session, pt was evaluated by KLEVER Babcock and started medications. Pt notes no significant changes in mood yet; however he does report "I don't really feel like drinking." Now sober x 4 days. Denies current detox symptoms. Gave positive feedback on changes for overall health. Pt processed argument with son and wife which prompted him to leave home x 10 days- stayed with brother. Pt notes ongoing stressors with son, uncertain of how to connect. Discussed coping strategies, stressed importance of self care. Will continue with sessions as scheduled.     Treatment plan:  · Target symptoms: alcohol abuse, depression, anxiety , substance abuse, adjustment  · Why chosen therapy is appropriate versus another modality: relevant to diagnosis, patient responds to this modality, evidence based practice  · Outcome monitoring methods: self-report, observation  · Therapeutic intervention type: insight oriented psychotherapy, behavior modifying psychotherapy, supportive psychotherapy    Risk parameters:  Patient reports no suicidal ideation  Patient reports no homicidal ideation  Patient reports no self-injurious behavior  Patient reports no violent behavior    Verbal deficits: None    Patient's response to intervention:  The patient's response to intervention is reluctant.    Progress toward goals and other mental status changes:  The patient's progress " toward goals is limited- progressing slowly.     Diagnosis:     ICD-10-CM ICD-9-CM   1. ALBIN (generalized anxiety disorder)  F41.1 300.02   2. Alcohol use disorder, moderate, dependence  F10.20 303.90   3. Depression, unspecified depression type  F32.A 311       Plan:  individual psychotherapy, consult psychiatrist for medication evaluation and abstinence- gave referral information for ABU program.     Return to clinic: 2 weeks, as scheduled    Length of Service (minutes): 60

## 2022-05-04 ENCOUNTER — TELEPHONE (OUTPATIENT)
Dept: ENDOSCOPY | Facility: HOSPITAL | Age: 62
End: 2022-05-04
Payer: COMMERCIAL

## 2022-05-04 NOTE — TELEPHONE ENCOUNTER
Detailed message left that Dr Collins is not in clinic on 5/5. Appointment needs to be rescheduled. Also sent portal message

## 2022-05-17 ENCOUNTER — OFFICE VISIT (OUTPATIENT)
Dept: PSYCHIATRY | Facility: CLINIC | Age: 62
End: 2022-05-17
Payer: COMMERCIAL

## 2022-05-17 VITALS
WEIGHT: 159.81 LBS | DIASTOLIC BLOOD PRESSURE: 70 MMHG | HEART RATE: 66 BPM | BODY MASS INDEX: 27.44 KG/M2 | SYSTOLIC BLOOD PRESSURE: 149 MMHG

## 2022-05-17 DIAGNOSIS — F10.20 ALCOHOL USE DISORDER, MODERATE, DEPENDENCE: Primary | ICD-10-CM

## 2022-05-17 DIAGNOSIS — F41.1 GENERALIZED ANXIETY DISORDER: ICD-10-CM

## 2022-05-17 PROCEDURE — 3077F PR MOST RECENT SYSTOLIC BLOOD PRESSURE >= 140 MM HG: ICD-10-PCS | Mod: CPTII,S$GLB,, | Performed by: PHYSICIAN ASSISTANT

## 2022-05-17 PROCEDURE — 1159F PR MEDICATION LIST DOCUMENTED IN MEDICAL RECORD: ICD-10-PCS | Mod: CPTII,S$GLB,, | Performed by: PHYSICIAN ASSISTANT

## 2022-05-17 PROCEDURE — 1160F RVW MEDS BY RX/DR IN RCRD: CPT | Mod: CPTII,S$GLB,, | Performed by: PHYSICIAN ASSISTANT

## 2022-05-17 PROCEDURE — 99213 OFFICE O/P EST LOW 20 MIN: CPT | Mod: S$GLB,,, | Performed by: PHYSICIAN ASSISTANT

## 2022-05-17 PROCEDURE — 99213 PR OFFICE/OUTPT VISIT, EST, LEVL III, 20-29 MIN: ICD-10-PCS | Mod: S$GLB,,, | Performed by: PHYSICIAN ASSISTANT

## 2022-05-17 PROCEDURE — 3078F PR MOST RECENT DIASTOLIC BLOOD PRESSURE < 80 MM HG: ICD-10-PCS | Mod: CPTII,S$GLB,, | Performed by: PHYSICIAN ASSISTANT

## 2022-05-17 PROCEDURE — 1159F MED LIST DOCD IN RCRD: CPT | Mod: CPTII,S$GLB,, | Performed by: PHYSICIAN ASSISTANT

## 2022-05-17 PROCEDURE — 99999 PR PBB SHADOW E&M-EST. PATIENT-LVL III: ICD-10-PCS | Mod: PBBFAC,,, | Performed by: PHYSICIAN ASSISTANT

## 2022-05-17 PROCEDURE — 1160F PR REVIEW ALL MEDS BY PRESCRIBER/CLIN PHARMACIST DOCUMENTED: ICD-10-PCS | Mod: CPTII,S$GLB,, | Performed by: PHYSICIAN ASSISTANT

## 2022-05-17 PROCEDURE — 3078F DIAST BP <80 MM HG: CPT | Mod: CPTII,S$GLB,, | Performed by: PHYSICIAN ASSISTANT

## 2022-05-17 PROCEDURE — 3077F SYST BP >= 140 MM HG: CPT | Mod: CPTII,S$GLB,, | Performed by: PHYSICIAN ASSISTANT

## 2022-05-17 PROCEDURE — 3008F BODY MASS INDEX DOCD: CPT | Mod: CPTII,S$GLB,, | Performed by: PHYSICIAN ASSISTANT

## 2022-05-17 PROCEDURE — 3008F PR BODY MASS INDEX (BMI) DOCUMENTED: ICD-10-PCS | Mod: CPTII,S$GLB,, | Performed by: PHYSICIAN ASSISTANT

## 2022-05-17 PROCEDURE — 99999 PR PBB SHADOW E&M-EST. PATIENT-LVL III: CPT | Mod: PBBFAC,,, | Performed by: PHYSICIAN ASSISTANT

## 2022-05-17 NOTE — PROGRESS NOTES
"Outpatient Psychiatry Follow-Up Visit (PA)    05/17/2022    Clinical Status of Patient:  Outpatient (Ambulatory)    Chief Complaint:  Azam Bowden Jr. is a 61 y.o. male who presents today for follow-up of anxiety.  Met with patient.     Current Medications:   Lexapro 10 mg  Hydroxyzine 25 mg    Interval History and Content of Current Session:  Patient seen and chart reviewed. Last seen on 4/08/2022    Patient has a psychiatric history of: ALBIN with panic attacks    Pt presents for follow up today after starting lexapro 10 mg. He reports he has not been taking the hydroxyzine 25 mg.     The patient continues to endorse anxiety and stress. His son was recently kicked out of [employer] which has been a major stressor for the patient.     The patient reports he doesn't think the medication is working and complains of feeling tired, lazy, and experiencing lack of motivation.     The patient states he doesn't know if he needs to take the medication as when things are going well, he is good and happy, its only when things go wrong that he feels badly.     He reports his mood is pretty bad at this time due to his son getting kicked out. He reports they are going to have to move in order to get him into another school.     He continues to endorse anxiety and "feeling nervous".     The patient reports he is trying to stop drinking. He states he sometimes drinks none at all, sometimes has 2 drinks, sometimes 4. Patient is not willing to try naltrexone for alcohol cravings, stating "i've got pretty good willpower."     When discussing patient's anxiety, patient is unwilling to increase the dose of lexapro, however he also is hesitant to make medication changes. The patient states he wants to get off the medication at this time, later stating that he has experienced increased depressive thoughts and passive SI. This provider advises him at this point to discontinue the medication.     Patient wants to discuss medication " options with his family before making changes today.     He is sleeping and eating well, though he continues to endorse negative thoughts when waking in the morning.     He continues to endorse intrusive, passive SI, but denies plan and states he would never act on it, as he couldn't do that to his family.     Pt reports taking medications as prescribed and behaving appropriately during interview today.      Psychotherapy:  · Target symptoms: alcohol abuse, anxiety   · Why chosen therapy is appropriate versus another modality: relevant to diagnosis  · Outcome monitoring methods: self-report  · Therapeutic intervention type: insight oriented psychotherapy, supportive psychotherapy  · Topics discussed/themes: parenting issues, building skills sets for symptom management, symptom recognition  · The patient's response to the intervention is guarded. The patient's progress toward treatment goals is not progressing.   · Duration of intervention: 12 minutes.    Review of Systems   · PSYCHIATRIC: Pertinant items are noted in the narrative.    Past Medication Trials:    Past Medical, Family and Social History: The patient's past medical, family and social history have been reviewed and updated as appropriate within the electronic medical record - see encounter notes.    Compliance: yes    Side effects: increased depression, advised to stop    Risk Parameters:  Patient reports suicidal ideation: passive SI, no plan, no intent  Patient reports no homicidal ideation  Patient reports no self-injurious behavior  Patient reports no violent behavior    Exam (detailed: at least 9 elements; comprehensive: all 15 elements)   Constitutional  Vitals:  Most recent vital signs, dated less than 90 days prior to this appointment, were reviewed.   Vitals:    05/17/22 0835   BP: (!) 149/70   Pulse: 66   Weight: 72.5 kg (159 lb 13.3 oz)        General:  unremarkable, age appropriate, casually dressed     Musculoskeletal  Muscle Strength/Tone:   not examined   Gait & Station:  non-ataxic     Psychiatric  Speech:  no latency; no press   Mood & Affect:  anxious, dysthymic  congruent and appropriate, anxious   Thought Process:  normal and logical   Associations:  intact   Thought Content:  normal, no suicidality, no homicidality, delusions, or paranoia   Insight:  intact   Judgement: behavior is adequate to circumstances   Orientation:  grossly intact   Memory: intact for content of interview   Language: grossly intact   Attention Span & Concentration:  able to focus   Fund of Knowledge:  intact and appropriate to age and level of education     Assessment and Diagnosis   Status/Progress: Based on the examination today, the patient's problem(s) is/are treatment resistant.  New problems have not been presented today.   Lack of compliance are not complicating management of the primary condition.  There are no active rule-out diagnoses for this patient at this time.     General Impression: Patient is a 61 year old male with a psychiatric history of ALBIN with panic attacks. Patient has not noted any benefit on lexapro 10 mg and reports increased passive SI. Patient advised to stop. Patient wants to discuss medication options with his family before starting something else. Patient given contact.       ICD-10-CM ICD-9-CM    1. Alcohol use disorder, moderate, dependence  F10.20 303.90    2. Generalized anxiety disorder  F41.1 300.02        Intervention/Counseling/Treatment Plan   · Medication Management: The risks and benefits of medication were discussed with the patient. Taper to 5 mg for 1 week, then stop   · Taper lexapro to 5 mg for 1 week, then stop  · Contact this provider regarding decision after discussing with family  · Patient voices understanding and agreement with this plan  · Encouraged patient to keep future appointments  · Instruct patient to call or message with questions  · In the event of an emergency, including suicidal ideation, patient was advised  to go to the emergency room      Return to Clinic: as needed    Leslie Portillo PA-C

## 2022-06-07 ENCOUNTER — DOCUMENTATION ONLY (OUTPATIENT)
Dept: PSYCHIATRY | Facility: CLINIC | Age: 62
End: 2022-06-07
Payer: COMMERCIAL

## 2022-06-07 ENCOUNTER — PATIENT MESSAGE (OUTPATIENT)
Dept: PSYCHIATRY | Facility: CLINIC | Age: 62
End: 2022-06-07
Payer: COMMERCIAL

## 2022-06-07 NOTE — PROGRESS NOTES
Pt no-showed for scheduled appointment. Called and left voicemail and reached out through pt portal.

## 2022-06-22 ENCOUNTER — OFFICE VISIT (OUTPATIENT)
Dept: PSYCHIATRY | Facility: CLINIC | Age: 62
End: 2022-06-22
Payer: COMMERCIAL

## 2022-06-22 ENCOUNTER — OFFICE VISIT (OUTPATIENT)
Dept: SPORTS MEDICINE | Facility: CLINIC | Age: 62
End: 2022-06-22
Payer: COMMERCIAL

## 2022-06-22 ENCOUNTER — HOSPITAL ENCOUNTER (OUTPATIENT)
Dept: RADIOLOGY | Facility: HOSPITAL | Age: 62
Discharge: HOME | End: 2022-06-22
Attending: ORTHOPAEDIC SURGERY
Payer: COMMERCIAL

## 2022-06-22 VITALS
WEIGHT: 164.19 LBS | SYSTOLIC BLOOD PRESSURE: 135 MMHG | BODY MASS INDEX: 28.03 KG/M2 | DIASTOLIC BLOOD PRESSURE: 74 MMHG | HEIGHT: 64 IN | HEART RATE: 48 BPM

## 2022-06-22 DIAGNOSIS — F41.1 GENERALIZED ANXIETY DISORDER: Primary | ICD-10-CM

## 2022-06-22 DIAGNOSIS — F32.A DEPRESSION, UNSPECIFIED DEPRESSION TYPE: ICD-10-CM

## 2022-06-22 DIAGNOSIS — M25.512 LEFT SHOULDER PAIN, UNSPECIFIED CHRONICITY: ICD-10-CM

## 2022-06-22 DIAGNOSIS — M25.512 LEFT SHOULDER PAIN, UNSPECIFIED CHRONICITY: Primary | ICD-10-CM

## 2022-06-22 DIAGNOSIS — F10.20 ALCOHOL USE DISORDER, MODERATE, DEPENDENCE: ICD-10-CM

## 2022-06-22 PROCEDURE — 3075F SYST BP GE 130 - 139MM HG: CPT | Mod: CPTII,S$GLB,, | Performed by: ORTHOPAEDIC SURGERY

## 2022-06-22 PROCEDURE — 3008F PR BODY MASS INDEX (BMI) DOCUMENTED: ICD-10-PCS | Mod: CPTII,S$GLB,, | Performed by: ORTHOPAEDIC SURGERY

## 2022-06-22 PROCEDURE — 99214 OFFICE O/P EST MOD 30 MIN: CPT | Mod: S$GLB,,, | Performed by: ORTHOPAEDIC SURGERY

## 2022-06-22 PROCEDURE — 3078F DIAST BP <80 MM HG: CPT | Mod: CPTII,S$GLB,, | Performed by: ORTHOPAEDIC SURGERY

## 2022-06-22 PROCEDURE — 99214 PR OFFICE/OUTPT VISIT, EST, LEVL IV, 30-39 MIN: ICD-10-PCS | Mod: S$GLB,,, | Performed by: ORTHOPAEDIC SURGERY

## 2022-06-22 PROCEDURE — 90837 PR PSYCHOTHERAPY W/PATIENT, 60 MIN: ICD-10-PCS | Mod: S$GLB,,, | Performed by: SOCIAL WORKER

## 2022-06-22 PROCEDURE — 73030 X-RAY EXAM OF SHOULDER: CPT | Mod: TC,LT

## 2022-06-22 PROCEDURE — 3008F BODY MASS INDEX DOCD: CPT | Mod: CPTII,S$GLB,, | Performed by: ORTHOPAEDIC SURGERY

## 2022-06-22 PROCEDURE — 99999 PR PBB SHADOW E&M-EST. PATIENT-LVL III: ICD-10-PCS | Mod: PBBFAC,,, | Performed by: ORTHOPAEDIC SURGERY

## 2022-06-22 PROCEDURE — 3078F PR MOST RECENT DIASTOLIC BLOOD PRESSURE < 80 MM HG: ICD-10-PCS | Mod: CPTII,S$GLB,, | Performed by: ORTHOPAEDIC SURGERY

## 2022-06-22 PROCEDURE — 90837 PSYTX W PT 60 MINUTES: CPT | Mod: S$GLB,,, | Performed by: SOCIAL WORKER

## 2022-06-22 PROCEDURE — 73030 X-RAY EXAM OF SHOULDER: CPT | Mod: 26,LT,, | Performed by: INTERNAL MEDICINE

## 2022-06-22 PROCEDURE — 73030 XR SHOULDER COMPLETE 2 OR MORE VIEWS LEFT: ICD-10-PCS | Mod: 26,LT,, | Performed by: INTERNAL MEDICINE

## 2022-06-22 PROCEDURE — 3075F PR MOST RECENT SYSTOLIC BLOOD PRESS GE 130-139MM HG: ICD-10-PCS | Mod: CPTII,S$GLB,, | Performed by: ORTHOPAEDIC SURGERY

## 2022-06-22 PROCEDURE — 99999 PR PBB SHADOW E&M-EST. PATIENT-LVL III: CPT | Mod: PBBFAC,,, | Performed by: ORTHOPAEDIC SURGERY

## 2022-06-22 NOTE — PROGRESS NOTES
CC: Left shoulder pain    Was doing well, but worse x 2 months.  Was reaching back and has pain since:  SANE 38          Prior hx:  right Shoulder pain, referred by wife (Joaquina Bowden)    61 y.o. 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      Patient states the pain has been present for >5 years without any injury. Patient states the pain waxes and wanes but has not significantly progressed over the last 5 years.     Pain has been keeping him up at night. Patient states pain is located at the lateral and anterior shoulder.    He reports that the pain is worse with overhead activity. It also bothers him at night.    SANE: 48         PAST MEDICAL HISTORY:   Past Medical History:   Diagnosis Date    Anxiety     GERD (gastroesophageal reflux disease)     Glaucoma     History of alcohol abuse     Ulcerative colitis     Ulcerative colitis      PAST SURGICAL HISTORY:   Past Surgical History:   Procedure Laterality Date    anal fistula      CATARACT EXTRACTION W/  INTRAOCULAR LENS IMPLANT Bilateral n/a    done outside [facility]    CATARACT EXTRACTION, BILATERAL      COLONOSCOPY  2016    ulcerative colitis    COLONOSCOPY N/A 1/28/2022    Procedure: COLONOSCOPY;  Surgeon: Philip Collins MD;  Location: [facility] ([facility]);  Service: Endoscopy;  Laterality: N/A;  First provider available for hematochezia and red blood mixed in stools heme-positive stools  rapid 630am-inst handed-tb    TONSILLECTOMY      VITRECTOMY BY PARS PLANA APPROACH Left 11/28/2018    Procedure: VITRECTOMY, PARS PLANA APPROACH;  Surgeon: DIANDRA Rushing MD;  Location: [facility] OR [address] [place] [address];  Service: Ophthalmology;  Laterality: Left;  20 min    VITRECTOMY BY PARS PLANA APPROACH Right 12/12/2018    Procedure: VITRECTOMY, PARS PLANA APPROACH;  Surgeon: DIANDRA Rushing MD;  Location: [facility] OR [address] [place] [address];  Service: Ophthalmology;  Laterality: Right;  20min     FAMILY  HISTORY:   Family History   Problem Relation Age of Onset    Cataracts Mother     Glaucoma Mother     Macular degeneration Mother     Diabetes Father     Cancer Father 75        Colon    Cataracts Father     Glaucoma Father     Colon cancer Father 70    Cancer Sister     Cancer Brother         prostate cancer    Crohn's disease Paternal Uncle     Inflammatory bowel disease Paternal Uncle     Colon cancer Paternal Grandfather 60    Amblyopia Neg Hx     Blindness Neg Hx     Hypertension Neg Hx     Retinal detachment Neg Hx     Strabismus Neg Hx     Celiac disease Neg Hx     Cirrhosis Neg Hx     Colon polyps Neg Hx     Esophageal cancer Neg Hx     Liver cancer Neg Hx     Liver disease Neg Hx     Rectal cancer Neg Hx     Stomach cancer Neg Hx     Ulcerative colitis Neg Hx     Pancreatitis Neg Hx     Pancreatic cancer Neg Hx     Kidney cancer Neg Hx     Bladder Cancer Neg Hx     Uterine cancer Neg Hx     Ovarian cancer Neg Hx      SOCIAL HISTORY:   Social History     Socioeconomic History    Marital status:     Number of children: 5   Occupational History    Occupation: Owner [employer]   Tobacco Use    Smoking status: Former Smoker     Packs/day: 1.00     Years: 20.00     Pack years: 20.00     Types: Cigarettes     Quit date: 2/10/2009     Years since quittin.3    Smokeless tobacco: Former User   Substance and Sexual Activity    Alcohol use: Not Currently     Alcohol/week: 8.0 standard drinks     Types: 8 Cans of beer per week     Comment: last drink 1/3/22    Drug use: Yes     Types: Marijuana     Comment: Cannabis occasionally    Sexual activity: Yes     Partners: Female     Comment: Elsy   Social History Narrative    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 Rescue, Disp: 18 g, Rfl: PRN    dorzolamide-timolol 2-0.5% (COSOPT) 22.3-6.8 mg/mL ophthalmic solution, Place 1 drop into both eyes 2 (two) times daily., Disp: 10 mL, Rfl: 12    esomeprazole (NEXIUM) 20 MG capsule, Take 20 mg by mouth before breakfast., Disp: , Rfl:     atorvastatin (LIPITOR) 40 MG tablet, Take 1 tablet (40 mg total) by mouth every evening., Disp: 90 tablet, Rfl: 3    EScitalopram oxalate (LEXAPRO) 10 MG tablet, TAKE 1 TABLET BY MOUTH EVERY DAY (Patient not taking: Reported on 6/22/2022), Disp: 30 tablet, Rfl: 1    hydrOXYzine pamoate (VISTARIL) 25 MG Cap, TAKE 1 CAPSULE (25 MG TOTAL) BY MOUTH AS NEEDED (ANXIETY) DAILY (Patient not taking: Reported on 6/22/2022), Disp: 90 capsule, Rfl: 1    mesalamine (APRISO) 0.375 gram Cp24, Take 4 capsules (1.5 g total) by mouth once daily. (Patient not taking: Reported on 6/22/2022), Disp: 360 capsule, Rfl: 1    mesalamine (CANASA) 1000 MG Supp, Place 1 suppository (1,000 mg total) rectally nightly. (Patient not taking: Reported on 6/22/2022), Disp: 30 suppository, Rfl: 5  ALLERGIES: Review of patient's allergies indicates:  No Known Allergies    VITAL SIGNS: /74   Pulse (!) 48   Ht 5' 4" (1.626 m)   Wt 74.5 kg (164 lb 3.2 oz)   BMI 28.18 kg/m²      Review of Systems   Constitution: Negative. Negative for chills, fever and night sweats.   HENT: Negative for congestion and headaches.    Eyes: Negative for blurred vision, left vision loss and right vision loss.   Cardiovascular: Negative for chest pain and syncope.   Respiratory: Negative for cough and shortness of breath.    Endocrine: Negative for polydipsia, polyphagia and polyuria.   Hematologic/Lymphatic: Negative for bleeding problem. Does not bruise/bleed easily.   Skin: Negative for dry skin, itching and rash.   Musculoskeletal: Negative for falls and muscle weakness. "   Gastrointestinal: Negative for abdominal pain and bowel incontinence.   Genitourinary: Negative for bladder incontinence and nocturia.   Neurological: Negative for disturbances in coordination, loss of balance and seizures.   Psychiatric/Behavioral: Negative for depression. The patient does not have insomnia.    Allergic/Immunologic: Negative for hives and persistent infections.       PHYSICAL EXAMINATION:  General:  The patient is alert and oriented x 3.  Mood is pleasant.  Observation of ears, eyes and nose reveal no gross abnormalities.  HEENT: NCAT, sclera nonicteric  Lungs: Respirations are equal and unlabored.  Gait is coordinated. Patient can toe walk and heel walk without difficulty.  Cardiovascular: There are no swelling or varicosities present.   Lymphatic: Negative for adenopathy       Left SHOULDER / UPPER EXTREMITY EXAM    OBSERVATION:     Swelling  none  Deformity  none   Discoloration  none   Scapular winging none   Scars   none  Atrophy  none    TENDERNESS / CREPITUS (T/C):          T/C      T/C   Clavicle   -/-  SUPRAspinatus    -/-   AC Jt.    -/-  INFRAspinatus  -/-   SC Jt.    -/-  Deltoid    -/-   G. Tuberosity  -/-  LH BICEP groove  +/-   Acromion:  -/-  Midline Neck   -/-   Scapular Spine -/-  Trapezium   -/-   SMA Scapula  -/-  GH jt. line - post  -/-   Scapulothoracic  -/-         ROM: (* = with pain)  Right shoulder   Left shoulder        AROM (PROM)   AROM (PROM)   FE    170° (175°)     170° (175°)     ER at 0°    60°  (65°)    60°  (65°)   ER at 90° ABD  90°  (90°)    90°  (90°)   IR at 90°  ABD   NA  (40°)     NA  (40°)      IR (spine level)   T10     T10    STRENGTH: (* = with pain) RIGHT SHOULDER  LEFT SHOULDER   SCAPTION at 0   5/5    4/5    SCAPTION at 30   5/5    5/5    IR    5/5    5/5   ER    5/5    5/5   BICEPS   5/5    5/5   Deltoid    5/5    5/5     SIGNS:  Painful side       NEER   +   OREGINAS  +   PETE   +   SPEEDS  Neg   DROP ARM   neg   BELLY  PRESS Neg   Superior escape none    LIFT-OFF  Neg   X-Body ADD    neg    MOVING VALGUS Neg      STABILITY TESTING    RIGHT SHOULDER   LEFT SHOULDER       Translation    Anterior  up face     up face    Posterior  up face    up face    Sulcus   < 10mm    < 10 mm    Signs    Apprehension   neg      Neg    Relocation   no change     no change    Jerk test  neg     Neg      EXTREMITY NEURO-VASCULAR EXAM    Sensation grossly intact to light touch all dermatomal regions.    DTR 2+ Biceps, Triceps, BR and Negative Ronnys sign   Grossly intact motor function at Elbow, Wrist and Hand   Distal pulses radial and ulnar 2+, brisk cap refill, symmetric.      NECK:  Painless FROM and spinous processes non-tender. Negative Spurlings sign.      OTHER FINDINGS:    XRAYS:  Shoulder trauma series right,  were ordered and reviewed by me. No convincing fracture or dislocation is noted. The osseous structures appear well mineralized and well aligned    1. Shoulder pain, left     Plan:       ASSESSMENT:  shoulder pain.    I do think that this is likely a rotator cuff tear.    I have recommended we check an MRI of the shoulder to evaluate this.    Depending on the results of the MRI, we may consider a cortisone   injection and physical therapy and/or arthroscopic intervention and treatment   depending on what we find.  I will see him back upon its completion or PHREV and we will consider the above.

## 2022-06-23 NOTE — PROGRESS NOTES
"Individual Psychotherapy (PhD/LCSW)    6/22/2022    Site:  [facility]         Therapeutic Intervention: Met with patient.  Outpatient - Insight oriented psychotherapy 60 min - CPT code 32341, Outpatient - Behavior modifying psychotherapy 60 min - CPT code 26481 and Outpatient - Supportive psychotherapy 60 min - CPT Code 10394    Chief complaint/reason for encounter: depression, anxiety, sleep, behavior and interpersonal     Interval history and content of current session: Pt presents on time to scheduled individual session. Last seen for therapy two months ago. Pt processed multiple changes since last session. Stopped medications, felt they were increasing his SI. Pt notes he continues to drink daily- 3 beers yesterday, as much as 8 beers [place]. He continues to hide alcohol from wife. Pt reports he "over did it a few times." Pt notes struggling with abstinence only approach- instead he is attempting harm reduction.  Expressed concerns around pt underreporting use, revisited impact of alcohol on his mental health. Pt has been more active- working on boat again, spending time with family. Pt has multiple upcoming trips planned. Son was kicked out of [employer]- they plan to move and enroll him on the [facility]. Assisted pt in reframing this as potential positive outcome, fresh start and moving to new area he could enjoy. Plan to continue with sessions as scheduled.     Treatment plan:  · Target symptoms: alcohol abuse, depression, anxiety , substance abuse, adjustment  · Why chosen therapy is appropriate versus another modality: relevant to diagnosis, patient responds to this modality, evidence based practice  · Outcome monitoring methods: self-report, observation  · Therapeutic intervention type: insight oriented psychotherapy, behavior modifying psychotherapy, supportive psychotherapy    Risk parameters:  Patient reports no suicidal ideation  Patient reports no homicidal " ideation  Patient reports no self-injurious behavior  Patient reports no violent behavior    Verbal deficits: None    Patient's response to intervention:  The patient's response to intervention is reluctant.    Progress toward goals and other mental status changes:  The patient's progress toward goals is limited- progressing slowly.     Diagnosis:     ICD-10-CM ICD-9-CM   1. Generalized anxiety disorder  F41.1 300.02   2. Alcohol use disorder, moderate, dependence  F10.20 303.90   3. Depression, unspecified depression type  F32.A 311       Plan:  individual psychotherapy and consult psychiatrist for medication evaluation    Return to clinic: 2 weeks, as scheduled    Length of Service (minutes): 60

## 2022-07-15 ENCOUNTER — HOSPITAL ENCOUNTER (OUTPATIENT)
Dept: RADIOLOGY | Facility: HOSPITAL | Age: 62
Discharge: HOME | End: 2022-07-15
Attending: ORTHOPAEDIC SURGERY
Payer: COMMERCIAL

## 2022-07-15 ENCOUNTER — TELEPHONE (OUTPATIENT)
Dept: SPORTS MEDICINE | Facility: CLINIC | Age: 62
End: 2022-07-15
Payer: COMMERCIAL

## 2022-07-15 DIAGNOSIS — F40.240 CLAUSTROPHOBIA: Primary | ICD-10-CM

## 2022-07-15 DIAGNOSIS — M25.512 LEFT SHOULDER PAIN, UNSPECIFIED CHRONICITY: ICD-10-CM

## 2022-07-15 RX ORDER — DIAZEPAM 5 MG/1
5 TABLET ORAL ONCE
Qty: 2 TABLET | Refills: 0 | Status: SHIPPED | OUTPATIENT
Start: 2022-07-15 | End: 2022-07-20 | Stop reason: SDUPTHER

## 2022-07-15 NOTE — TELEPHONE ENCOUNTER
----- Message from Magdiel Liao sent at 7/15/2022  8:42 AM CDT -----  Pt would like to be called back asap regarding rescheduling his MRI set for 7am today.    Pt can be reached at [phone].    Thanks

## 2022-07-20 ENCOUNTER — PATIENT MESSAGE (OUTPATIENT)
Dept: PSYCHIATRY | Facility: CLINIC | Age: 62
End: 2022-07-20
Payer: COMMERCIAL

## 2022-07-20 ENCOUNTER — PATIENT MESSAGE (OUTPATIENT)
Dept: INTERNAL MEDICINE | Facility: CLINIC | Age: 62
End: 2022-07-20
Payer: COMMERCIAL

## 2022-07-20 DIAGNOSIS — F40.240 CLAUSTROPHOBIA: ICD-10-CM

## 2022-07-20 RX ORDER — DIAZEPAM 5 MG/1
TABLET ORAL
Qty: 2 TABLET | Refills: 0 | Status: SHIPPED | OUTPATIENT
Start: 2022-07-20 | End: 2022-07-21

## 2022-07-21 ENCOUNTER — PATIENT MESSAGE (OUTPATIENT)
Dept: INTERNAL MEDICINE | Facility: CLINIC | Age: 62
End: 2022-07-21
Payer: COMMERCIAL

## 2022-07-21 DIAGNOSIS — F40.240 CLAUSTROPHOBIA: ICD-10-CM

## 2022-07-21 RX ORDER — DIAZEPAM 5 MG/1
5 TABLET ORAL DAILY PRN
Qty: 30 TABLET | Refills: 0 | Status: SHIPPED | OUTPATIENT
Start: 2022-07-21 | End: 2022-11-28 | Stop reason: SDUPTHER

## 2022-07-30 ENCOUNTER — PATIENT MESSAGE (OUTPATIENT)
Dept: INTERNAL MEDICINE | Facility: CLINIC | Age: 62
End: 2022-07-30
Payer: COMMERCIAL

## 2022-07-30 ENCOUNTER — PATIENT MESSAGE (OUTPATIENT)
Dept: PSYCHIATRY | Facility: CLINIC | Age: 62
End: 2022-07-30
Payer: COMMERCIAL

## 2022-08-01 ENCOUNTER — OFFICE VISIT (OUTPATIENT)
Dept: OPHTHALMOLOGY | Facility: CLINIC | Age: 62
End: 2022-08-01
Payer: COMMERCIAL

## 2022-08-01 ENCOUNTER — HOSPITAL ENCOUNTER (OUTPATIENT)
Dept: RADIOLOGY | Facility: HOSPITAL | Age: 62
Discharge: HOME | End: 2022-08-01
Attending: ORTHOPAEDIC SURGERY
Payer: COMMERCIAL

## 2022-08-01 ENCOUNTER — CLINICAL SUPPORT (OUTPATIENT)
Dept: OPHTHALMOLOGY | Facility: CLINIC | Age: 62
End: 2022-08-01
Payer: COMMERCIAL

## 2022-08-01 DIAGNOSIS — Z98.890 H/O VITRECTOMY: ICD-10-CM

## 2022-08-01 DIAGNOSIS — Z96.1 PSEUDOPHAKIA OF BOTH EYES: ICD-10-CM

## 2022-08-01 DIAGNOSIS — H40.1131 PRIMARY OPEN-ANGLE GLAUCOMA, BILATERAL, MILD STAGE: Primary | ICD-10-CM

## 2022-08-01 PROCEDURE — 1159F PR MEDICATION LIST DOCUMENTED IN MEDICAL RECORD: ICD-10-PCS | Mod: CPTII,S$GLB,, | Performed by: OPHTHALMOLOGY

## 2022-08-01 PROCEDURE — 73221 MRI JOINT UPR EXTREM W/O DYE: CPT | Mod: TC,LT

## 2022-08-01 PROCEDURE — 92083 EXTENDED VISUAL FIELD XM: CPT | Mod: S$GLB,,, | Performed by: OPHTHALMOLOGY

## 2022-08-01 PROCEDURE — 92133 CPTRZD OPH DX IMG PST SGM ON: CPT | Mod: S$GLB,,, | Performed by: OPHTHALMOLOGY

## 2022-08-01 PROCEDURE — 73221 MRI SHOULDER WITHOUT CONTRAST LEFT: ICD-10-PCS | Mod: 26,LT,, | Performed by: INTERNAL MEDICINE

## 2022-08-01 PROCEDURE — 1160F RVW MEDS BY RX/DR IN RCRD: CPT | Mod: CPTII,S$GLB,, | Performed by: OPHTHALMOLOGY

## 2022-08-01 PROCEDURE — 73221 MRI JOINT UPR EXTREM W/O DYE: CPT | Mod: 26,LT,, | Performed by: INTERNAL MEDICINE

## 2022-08-01 PROCEDURE — 92014 COMPRE OPH EXAM EST PT 1/>: CPT | Mod: S$GLB,,, | Performed by: OPHTHALMOLOGY

## 2022-08-01 PROCEDURE — 99999 PR PBB SHADOW E&M-EST. PATIENT-LVL III: ICD-10-PCS | Mod: PBBFAC,,, | Performed by: OPHTHALMOLOGY

## 2022-08-01 PROCEDURE — 92014 PR EYE EXAM, EST PATIENT,COMPREHESV: ICD-10-PCS | Mod: S$GLB,,, | Performed by: OPHTHALMOLOGY

## 2022-08-01 PROCEDURE — 92133 POSTERIOR SEGMENT OCT OPTIC NERVE(OCULAR COHERENCE TOMOGRAPHY) - OU - BOTH EYES: ICD-10-PCS | Mod: S$GLB,,, | Performed by: OPHTHALMOLOGY

## 2022-08-01 PROCEDURE — 92083 HUMPHREY VISUAL FIELD - OU - BOTH EYES: ICD-10-PCS | Mod: S$GLB,,, | Performed by: OPHTHALMOLOGY

## 2022-08-01 PROCEDURE — 1159F MED LIST DOCD IN RCRD: CPT | Mod: CPTII,S$GLB,, | Performed by: OPHTHALMOLOGY

## 2022-08-01 PROCEDURE — 1160F PR REVIEW ALL MEDS BY PRESCRIBER/CLIN PHARMACIST DOCUMENTED: ICD-10-PCS | Mod: CPTII,S$GLB,, | Performed by: OPHTHALMOLOGY

## 2022-08-01 PROCEDURE — 99999 PR PBB SHADOW E&M-EST. PATIENT-LVL III: CPT | Mod: PBBFAC,,, | Performed by: OPHTHALMOLOGY

## 2022-08-01 RX ORDER — LATANOPROST 50 UG/ML
1 SOLUTION/ DROPS OPHTHALMIC DAILY
Qty: 2.5 ML | Refills: 12 | Status: SHIPPED | OUTPATIENT
Start: 2022-08-01 | End: 2023-01-10 | Stop reason: SDUPTHER

## 2022-08-01 NOTE — PROGRESS NOTES
Visual field test done.  Patient stated no latex allergies used coverlet      Glasses Prescription     Sphere Cylinder Axis Add   Right -0.25 +0.75 180 +2.50   Left -0.25 +0.75 020 +2.50          Ordered, Authorized, and Signed By: Eddie Michel

## 2022-08-01 NOTE — PROGRESS NOTES
HPI     Glaucoma     Comments: HVF and OCT review today               Spots and/or Floaters     Comments: Pt states he has a few floaters but denies any flashes. Pt   states he has had them before and sees               Comments     DLS: 2/14/22    1. POAG OU  2. PCIOL OU  3. Ptosis  4. Hx PPVx OU    MEDS:  Cosopt BID OU            Last edited by Yenifer Sierra MA on 8/1/2022 10:09 AM. (History)            Assessment /Plan     For exam results, see Encounter Report.    Primary open-angle glaucoma, bilateral, mild stage  -     latanoprost 0.005 % ophthalmic solution; Place 1 drop into both eyes once daily.  Dispense: 2.5 mL; Refill: 12  -     Ingram Visual Field - OU - Extended - Both Eyes  -     Posterior Segment OCT Optic Nerve- Both eyes    Pseudophakia of both eyes    H/O vitrectomy        Lost to F/U from glaucoma clinic from 4/2019 to 2/2022 (3 years)   Has seen Dr Michel (2021) and Dr neumann (2022)      1.   Glaucoma (type and duration)   POAG / mild ou    First HVF   2019    First photos   2019   Treatment / Drops started   Cosopt BID OU - 2019            Family history    +father        Glaucoma meds    Cosopt BID ou         H/O adverse rxn to glaucoma drops    none        LASERS    none        GLAUCOMA SURGERIES    none        OTHER EYE SURGERIES  -  // PC IOL ou - multifocal - ? Where or when     PPVx   OU --s/p 25g PPV/partial AFx OS (11/28/18) --s/p 25g PPV/partial AFx OD (12/12/18) - For PVD w/ floaters ou          CDR    0.5/0.6 (sometimes called 0.3/0.4)         Tbase    16-28 // 16-35         Tmax    28/35            Ttarget    ?? About 21 ou            HVF    2 test 2019 to 2022 -full  od // non  Sp changes  os        Gonio    +3/+3        CCT    554/560        OCT    2 test 2019 to 2022 - RNFL - bord TI  od // nl  os        Disc photos    2019    - Ttoday    19//18  - Test done today    DFE OCT HVF    2. H/O PPVx ou    For severe floaters - ou - Anthonya     3. Pseudophakia   Multifocal  ou    Monitor    4. Ptosis - vis sign by walt RODRIGUEZ's    nel was willing to operate - but his insurance - blue cross / blue shield - says no , and so it is not covered by his insurance (2/2022)         PLAN   Change cosopt to latanoprost ou q day - can not remember bid dosing     F/U 4 months with IOP - with change to latanoprost // gonio

## 2022-08-02 ENCOUNTER — OFFICE VISIT (OUTPATIENT)
Dept: PSYCHIATRY | Facility: CLINIC | Age: 62
End: 2022-08-02
Payer: COMMERCIAL

## 2022-08-02 VITALS
BODY MASS INDEX: 28.04 KG/M2 | WEIGHT: 163.38 LBS | DIASTOLIC BLOOD PRESSURE: 70 MMHG | SYSTOLIC BLOOD PRESSURE: 150 MMHG | HEART RATE: 64 BPM

## 2022-08-02 DIAGNOSIS — F32.A DEPRESSION, UNSPECIFIED DEPRESSION TYPE: ICD-10-CM

## 2022-08-02 DIAGNOSIS — F41.1 GENERALIZED ANXIETY DISORDER: Primary | ICD-10-CM

## 2022-08-02 DIAGNOSIS — F10.20 ALCOHOL USE DISORDER, MODERATE, DEPENDENCE: ICD-10-CM

## 2022-08-02 PROCEDURE — 99214 PR OFFICE/OUTPT VISIT, EST, LEVL IV, 30-39 MIN: ICD-10-PCS | Mod: S$GLB,,, | Performed by: PHYSICIAN ASSISTANT

## 2022-08-02 PROCEDURE — 1159F PR MEDICATION LIST DOCUMENTED IN MEDICAL RECORD: ICD-10-PCS | Mod: CPTII,S$GLB,, | Performed by: PHYSICIAN ASSISTANT

## 2022-08-02 PROCEDURE — 3077F SYST BP >= 140 MM HG: CPT | Mod: CPTII,S$GLB,, | Performed by: PHYSICIAN ASSISTANT

## 2022-08-02 PROCEDURE — 99999 PR PBB SHADOW E&M-EST. PATIENT-LVL III: CPT | Mod: PBBFAC,,, | Performed by: PHYSICIAN ASSISTANT

## 2022-08-02 PROCEDURE — 3008F PR BODY MASS INDEX (BMI) DOCUMENTED: ICD-10-PCS | Mod: CPTII,S$GLB,, | Performed by: PHYSICIAN ASSISTANT

## 2022-08-02 PROCEDURE — 1160F RVW MEDS BY RX/DR IN RCRD: CPT | Mod: CPTII,S$GLB,, | Performed by: PHYSICIAN ASSISTANT

## 2022-08-02 PROCEDURE — 3078F PR MOST RECENT DIASTOLIC BLOOD PRESSURE < 80 MM HG: ICD-10-PCS | Mod: CPTII,S$GLB,, | Performed by: PHYSICIAN ASSISTANT

## 2022-08-02 PROCEDURE — 99214 OFFICE O/P EST MOD 30 MIN: CPT | Mod: S$GLB,,, | Performed by: PHYSICIAN ASSISTANT

## 2022-08-02 PROCEDURE — 3008F BODY MASS INDEX DOCD: CPT | Mod: CPTII,S$GLB,, | Performed by: PHYSICIAN ASSISTANT

## 2022-08-02 PROCEDURE — 1160F PR REVIEW ALL MEDS BY PRESCRIBER/CLIN PHARMACIST DOCUMENTED: ICD-10-PCS | Mod: CPTII,S$GLB,, | Performed by: PHYSICIAN ASSISTANT

## 2022-08-02 PROCEDURE — 1159F MED LIST DOCD IN RCRD: CPT | Mod: CPTII,S$GLB,, | Performed by: PHYSICIAN ASSISTANT

## 2022-08-02 PROCEDURE — 3078F DIAST BP <80 MM HG: CPT | Mod: CPTII,S$GLB,, | Performed by: PHYSICIAN ASSISTANT

## 2022-08-02 PROCEDURE — 99999 PR PBB SHADOW E&M-EST. PATIENT-LVL III: ICD-10-PCS | Mod: PBBFAC,,, | Performed by: PHYSICIAN ASSISTANT

## 2022-08-02 PROCEDURE — 3077F PR MOST RECENT SYSTOLIC BLOOD PRESSURE >= 140 MM HG: ICD-10-PCS | Mod: CPTII,S$GLB,, | Performed by: PHYSICIAN ASSISTANT

## 2022-08-02 RX ORDER — FLUOXETINE 10 MG/1
10 CAPSULE ORAL DAILY
Qty: 30 CAPSULE | Refills: 2 | Status: SHIPPED | OUTPATIENT
Start: 2022-08-02 | End: 2022-11-28
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
## 2022-08-03 ENCOUNTER — TELEPHONE (OUTPATIENT)
Dept: SPORTS MEDICINE | Facility: CLINIC | Age: 62
End: 2022-08-03
Payer: COMMERCIAL

## 2022-08-03 NOTE — TELEPHONE ENCOUNTER
I called the patient twice with no answer, I left a voicemail encouraging him to return the call to discuss his results and plan below     MRI Left shoulder: High grade partial thickness supraspinatus tear without atrophy or retraction. SLAP, biceps tendinitis, + insufficiency fracture greater tuberosity      ASSESSMENT:    Left Shoulder Rotator Cuff Tear, SLAP, biceps tendonitis.      he would benefit from an arthroscopy, given the above.       PLAN:   Left Shoulder rotator cuff tear     All questions were answered, pt will contact us for questions or concerns in the interim.  Had thorough discussion of non-operative vs operative intervention, and risks and benefits of both.     We have discussed the surgery and recovery of arthroscopic shoulder surgery. he understands that there may be limited mobility up to several weeks after surgery depending on procedures that are performed at the time of surgery.    The spectrum of treatment options were discussed with the patient, including nonoperative and operative options.  After thorough discussion, the patient has elected to undergo surgical treatment to include:    left   a. Shoulder arthroscopic rotator cuff repair versus debridement with Cuffmend   b. Shoulder arthroscopic SAD   c. Shoulder arthroscopic extensive debridement   d. Shoulder arthroscopic biceps tenodesis    e. Shoulder arthroscopic possible microfracture   f.  Shoulder open reduction internal fixation greater tuberosity    The details of the surgical procedure were explained, including the location of probable incisions and a description of likely hardware and/or grafts to be used.  The patient understands the likely convalescence after surgery.  Also, we have thoroughly discussed the risks, benefits and alternatives to surgery, including, but not limited to, the risk of infection, joint stiffness, blood clot (including DVT and/or pulmonary embolus), neurologic and vascular injury.  It was explained  that, if tissue has been repaired or reconstructed, there is a chance of failure, which may require further management.  Consent form for surgery is signed and in chart.

## 2022-08-04 ENCOUNTER — DOCUMENTATION ONLY (OUTPATIENT)
Dept: SPORTS MEDICINE | Facility: CLINIC | Age: 62
End: 2022-08-04
Payer: COMMERCIAL

## 2022-08-04 ENCOUNTER — TELEPHONE (OUTPATIENT)
Dept: SPORTS MEDICINE | Facility: CLINIC | Age: 62
End: 2022-08-04
Payer: COMMERCIAL

## 2022-08-04 NOTE — TELEPHONE ENCOUNTER
"----- Message from Deloris Quezada MA sent at 8/4/2022 10:13 AM CDT -----  Regarding: FW: Returning Call    ----- Message -----  From: Elvira Alejandro  Sent: 8/4/2022  10:02 AM CDT  To: Sandra Roberts Staff  Subject: Returning Call                                               Name of Who is Calling: LOC KELSEY    Who Left The Message: LOC COLE      What is the request in detail:     Patient called, "stating he's returning the Office's call and would like you to please call again." Thank you!      Reply by [facility]: No    Preferred Call Back  :  [phone] (M)                                             "

## 2022-08-08 ENCOUNTER — PATIENT MESSAGE (OUTPATIENT)
Dept: SPORTS MEDICINE | Facility: CLINIC | Age: 62
End: 2022-08-08
Payer: COMMERCIAL

## 2022-08-17 ENCOUNTER — PATIENT MESSAGE (OUTPATIENT)
Dept: SPORTS MEDICINE | Facility: CLINIC | Age: 62
End: 2022-08-17
Payer: COMMERCIAL

## 2022-08-18 ENCOUNTER — TELEPHONE (OUTPATIENT)
Dept: SPORTS MEDICINE | Facility: CLINIC | Age: 62
End: 2022-08-18
Payer: COMMERCIAL

## 2022-08-18 NOTE — TELEPHONE ENCOUNTER
I called the patient to discuss surgery dates and to give instructions on obtaining clearance prior to surgery. Left message to please give me a call back.

## 2022-09-02 ENCOUNTER — TELEPHONE (OUTPATIENT)
Dept: SPORTS MEDICINE | Facility: CLINIC | Age: 62
End: 2022-09-02
Payer: COMMERCIAL

## 2022-09-02 NOTE — TELEPHONE ENCOUNTER
Called Navi [facility]ennise to schedule his surgery. Patient didn't answer. Left message for him to please call back so that we can get him scheduled.

## 2022-09-13 DIAGNOSIS — S42.252A CLOSED DISPLACED FRACTURE OF GREATER TUBEROSITY OF LEFT HUMERUS, INITIAL ENCOUNTER: ICD-10-CM

## 2022-09-13 DIAGNOSIS — S43.432A SUPERIOR GLENOID LABRUM LESION OF LEFT SHOULDER, INITIAL ENCOUNTER: ICD-10-CM

## 2022-09-13 DIAGNOSIS — M75.22 BICEPS TENDINITIS OF LEFT UPPER EXTREMITY: ICD-10-CM

## 2022-09-13 DIAGNOSIS — M75.102 NONTRAUMATIC TEAR OF LEFT ROTATOR CUFF, UNSPECIFIED TEAR EXTENT: Primary | ICD-10-CM

## 2022-10-27 ENCOUNTER — PATIENT MESSAGE (OUTPATIENT)
Dept: PREADMISSION TESTING | Facility: HOSPITAL | Age: 62
End: 2022-10-27
Payer: COMMERCIAL

## 2022-10-29 ENCOUNTER — PATIENT MESSAGE (OUTPATIENT)
Dept: SURGERY | Facility: HOSPITAL | Age: 62
End: 2022-10-29
Payer: COMMERCIAL

## 2022-10-31 ENCOUNTER — OFFICE VISIT (OUTPATIENT)
Dept: SPORTS MEDICINE | Facility: CLINIC | Age: 62
End: 2022-10-31
Payer: COMMERCIAL

## 2022-10-31 ENCOUNTER — PATIENT MESSAGE (OUTPATIENT)
Dept: INTERNAL MEDICINE | Facility: CLINIC | Age: 62
End: 2022-10-31
Payer: COMMERCIAL

## 2022-10-31 VITALS
BODY MASS INDEX: 28 KG/M2 | SYSTOLIC BLOOD PRESSURE: 144 MMHG | WEIGHT: 164 LBS | DIASTOLIC BLOOD PRESSURE: 70 MMHG | HEART RATE: 65 BPM | HEIGHT: 64 IN

## 2022-10-31 DIAGNOSIS — M75.22 BICEPS TENDINITIS OF LEFT UPPER EXTREMITY: ICD-10-CM

## 2022-10-31 DIAGNOSIS — M75.102 NONTRAUMATIC TEAR OF LEFT ROTATOR CUFF, UNSPECIFIED TEAR EXTENT: Primary | ICD-10-CM

## 2022-10-31 PROCEDURE — 1160F PR REVIEW ALL MEDS BY PRESCRIBER/CLIN PHARMACIST DOCUMENTED: ICD-10-PCS | Mod: CPTII,S$GLB,, | Performed by: PHYSICIAN ASSISTANT

## 2022-10-31 PROCEDURE — 99999 PR PBB SHADOW E&M-EST. PATIENT-LVL III: CPT | Mod: PBBFAC,,, | Performed by: PHYSICIAN ASSISTANT

## 2022-10-31 PROCEDURE — 99999 PR PBB SHADOW E&M-EST. PATIENT-LVL III: ICD-10-PCS | Mod: PBBFAC,,, | Performed by: PHYSICIAN ASSISTANT

## 2022-10-31 PROCEDURE — 3077F SYST BP >= 140 MM HG: CPT | Mod: CPTII,S$GLB,, | Performed by: PHYSICIAN ASSISTANT

## 2022-10-31 PROCEDURE — 3078F DIAST BP <80 MM HG: CPT | Mod: CPTII,S$GLB,, | Performed by: PHYSICIAN ASSISTANT

## 2022-10-31 PROCEDURE — 99499 UNLISTED E&M SERVICE: CPT | Mod: S$GLB,,, | Performed by: PHYSICIAN ASSISTANT

## 2022-10-31 PROCEDURE — 1159F PR MEDICATION LIST DOCUMENTED IN MEDICAL RECORD: ICD-10-PCS | Mod: CPTII,S$GLB,, | Performed by: PHYSICIAN ASSISTANT

## 2022-10-31 PROCEDURE — 3008F PR BODY MASS INDEX (BMI) DOCUMENTED: ICD-10-PCS | Mod: CPTII,S$GLB,, | Performed by: PHYSICIAN ASSISTANT

## 2022-10-31 PROCEDURE — 3077F PR MOST RECENT SYSTOLIC BLOOD PRESSURE >= 140 MM HG: ICD-10-PCS | Mod: CPTII,S$GLB,, | Performed by: PHYSICIAN ASSISTANT

## 2022-10-31 PROCEDURE — 99499 NO LOS: ICD-10-PCS | Mod: S$GLB,,, | Performed by: PHYSICIAN ASSISTANT

## 2022-10-31 PROCEDURE — 3078F PR MOST RECENT DIASTOLIC BLOOD PRESSURE < 80 MM HG: ICD-10-PCS | Mod: CPTII,S$GLB,, | Performed by: PHYSICIAN ASSISTANT

## 2022-10-31 PROCEDURE — 1160F RVW MEDS BY RX/DR IN RCRD: CPT | Mod: CPTII,S$GLB,, | Performed by: PHYSICIAN ASSISTANT

## 2022-10-31 PROCEDURE — 3008F BODY MASS INDEX DOCD: CPT | Mod: CPTII,S$GLB,, | Performed by: PHYSICIAN ASSISTANT

## 2022-10-31 PROCEDURE — 1159F MED LIST DOCD IN RCRD: CPT | Mod: CPTII,S$GLB,, | Performed by: PHYSICIAN ASSISTANT

## 2022-11-01 ENCOUNTER — TELEPHONE (OUTPATIENT)
Dept: SPORTS MEDICINE | Facility: CLINIC | Age: 62
End: 2022-11-01
Payer: COMMERCIAL

## 2022-11-01 RX ORDER — OXYCODONE HCL 10 MG/1
10 TABLET, FILM COATED, EXTENDED RELEASE ORAL
Status: CANCELLED | OUTPATIENT
Start: 2022-11-02 | End: 2022-11-02

## 2022-11-01 RX ORDER — PREGABALIN 75 MG/1
75 CAPSULE ORAL
Status: CANCELLED | OUTPATIENT
Start: 2022-11-02 | End: 2022-11-02

## 2022-11-01 RX ORDER — CLINDAMYCIN PHOSPHATE 900 MG/50ML
900 INJECTION, SOLUTION INTRAVENOUS
Status: CANCELLED | OUTPATIENT
Start: 2022-11-01

## 2022-11-01 RX ORDER — SODIUM CHLORIDE 9 MG/ML
INJECTION, SOLUTION INTRAVENOUS CONTINUOUS
Status: CANCELLED | OUTPATIENT
Start: 2022-11-02

## 2022-11-01 NOTE — TELEPHONE ENCOUNTER
----- Message from Aldo Ayala MA sent at 11/1/2022 11:09 AM CDT -----  Contact: [phone]  Pt is returning phone call back to Deloris in regards to injection. Pt can be reached at [phone]

## 2022-11-01 NOTE — TELEPHONE ENCOUNTER
I called  Dennise to schedule an appointment for  a shoulder injection with Dr. Flores. Mr. Bowden did not answer I left a message to please call back.

## 2022-11-02 ENCOUNTER — PATIENT MESSAGE (OUTPATIENT)
Dept: SPORTS MEDICINE | Facility: CLINIC | Age: 62
End: 2022-11-02

## 2022-11-02 ENCOUNTER — OFFICE VISIT (OUTPATIENT)
Dept: SPORTS MEDICINE | Facility: CLINIC | Age: 62
End: 2022-11-02
Payer: COMMERCIAL

## 2022-11-02 VITALS
HEART RATE: 71 BPM | SYSTOLIC BLOOD PRESSURE: 141 MMHG | DIASTOLIC BLOOD PRESSURE: 75 MMHG | WEIGHT: 167 LBS | BODY MASS INDEX: 28.51 KG/M2 | HEIGHT: 64 IN

## 2022-11-02 DIAGNOSIS — M75.102 NONTRAUMATIC TEAR OF LEFT ROTATOR CUFF, UNSPECIFIED TEAR EXTENT: Primary | ICD-10-CM

## 2022-11-02 PROCEDURE — 99999 PR PBB SHADOW E&M-EST. PATIENT-LVL III: ICD-10-PCS | Mod: PBBFAC,,, | Performed by: ORTHOPAEDIC SURGERY

## 2022-11-02 PROCEDURE — 3077F PR MOST RECENT SYSTOLIC BLOOD PRESSURE >= 140 MM HG: ICD-10-PCS | Mod: CPTII,S$GLB,, | Performed by: ORTHOPAEDIC SURGERY

## 2022-11-02 PROCEDURE — 1159F MED LIST DOCD IN RCRD: CPT | Mod: CPTII,S$GLB,, | Performed by: ORTHOPAEDIC SURGERY

## 2022-11-02 PROCEDURE — 3078F DIAST BP <80 MM HG: CPT | Mod: CPTII,S$GLB,, | Performed by: ORTHOPAEDIC SURGERY

## 2022-11-02 PROCEDURE — 99999 PR PBB SHADOW E&M-EST. PATIENT-LVL III: CPT | Mod: PBBFAC,,, | Performed by: ORTHOPAEDIC SURGERY

## 2022-11-02 PROCEDURE — 3077F SYST BP >= 140 MM HG: CPT | Mod: CPTII,S$GLB,, | Performed by: ORTHOPAEDIC SURGERY

## 2022-11-02 PROCEDURE — 20610 LARGE JOINT ASPIRATION/INJECTION: L SUBACROMIAL BURSA: ICD-10-PCS | Mod: LT,S$GLB,, | Performed by: ORTHOPAEDIC SURGERY

## 2022-11-02 PROCEDURE — 1159F PR MEDICATION LIST DOCUMENTED IN MEDICAL RECORD: ICD-10-PCS | Mod: CPTII,S$GLB,, | Performed by: ORTHOPAEDIC SURGERY

## 2022-11-02 PROCEDURE — 3008F BODY MASS INDEX DOCD: CPT | Mod: CPTII,S$GLB,, | Performed by: ORTHOPAEDIC SURGERY

## 2022-11-02 PROCEDURE — 20610 DRAIN/INJ JOINT/BURSA W/O US: CPT | Mod: LT,S$GLB,, | Performed by: ORTHOPAEDIC SURGERY

## 2022-11-02 PROCEDURE — 99214 PR OFFICE/OUTPT VISIT, EST, LEVL IV, 30-39 MIN: ICD-10-PCS | Mod: 25,S$GLB,, | Performed by: ORTHOPAEDIC SURGERY

## 2022-11-02 PROCEDURE — 99214 OFFICE O/P EST MOD 30 MIN: CPT | Mod: 25,S$GLB,, | Performed by: ORTHOPAEDIC SURGERY

## 2022-11-02 PROCEDURE — 3008F PR BODY MASS INDEX (BMI) DOCUMENTED: ICD-10-PCS | Mod: CPTII,S$GLB,, | Performed by: ORTHOPAEDIC SURGERY

## 2022-11-02 PROCEDURE — 3078F PR MOST RECENT DIASTOLIC BLOOD PRESSURE < 80 MM HG: ICD-10-PCS | Mod: CPTII,S$GLB,, | Performed by: ORTHOPAEDIC SURGERY

## 2022-11-02 RX ORDER — TRIAMCINOLONE ACETONIDE 40 MG/ML
80 INJECTION, SUSPENSION INTRA-ARTICULAR; INTRAMUSCULAR
Status: DISCONTINUED | OUTPATIENT
Start: 2022-11-02 | End: 2022-11-02 | Stop reason: HOSPADM

## 2022-11-02 RX ADMIN — TRIAMCINOLONE ACETONIDE 80 MG: 40 INJECTION, SUSPENSION INTRA-ARTICULAR; INTRAMUSCULAR at 02:11

## 2022-11-02 NOTE — PROGRESS NOTES
CC: Left shoulder pain    Patient was previously scheduled for L RTC repair on 11/3 and patient elected to defer surgery at this time and proceed with a CSI with a course of PT. Endorses no change in his symptoms.     SANE 40          Prior hx:  right Shoulder pain, referred by wife (Joaquina Bowden)    62 y.o. 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      Patient states the pain has been present for >5 years without any injury. Patient states the pain waxes and wanes but has not significantly progressed over the last 5 years.     Pain has been keeping him up at night. Patient states pain is located at the lateral and anterior shoulder.    He reports that the pain is worse with overhead activity. It also bothers him at night.    SANE: 50         PAST MEDICAL HISTORY:   Past Medical History:   Diagnosis Date    Anxiety     GERD (gastroesophageal reflux disease)     Glaucoma     History of alcohol abuse     Ulcerative colitis     Ulcerative colitis      PAST SURGICAL HISTORY:   Past Surgical History:   Procedure Laterality Date    anal fistula      CATARACT EXTRACTION W/  INTRAOCULAR LENS IMPLANT Bilateral n/a    done outside [facility]    CATARACT EXTRACTION, BILATERAL      COLONOSCOPY  2016    ulcerative colitis    COLONOSCOPY N/A 1/28/2022    Procedure: COLONOSCOPY;  Surgeon: Philip Collins MD;  Location: [facility] (4TH FLR);  Service: Endoscopy;  Laterality: N/A;  First provider available for hematochezia and red blood mixed in stools heme-positive stools  rapid 630am-inst handed-tb    TONSILLECTOMY      VITRECTOMY BY PARS PLANA APPROACH Left 11/28/2018    Procedure: VITRECTOMY, PARS PLANA APPROACH;  Surgeon: DIANDRA Rushing MD;  Location: [address];  Service: Ophthalmology;  Laterality: Left;  20 min    VITRECTOMY BY PARS PLANA APPROACH Right 12/12/2018    Procedure: VITRECTOMY, PARS PLANA APPROACH;  Surgeon: DIANDRA Lee  MD Сергей;  Location: [facility] OR [address] [place] [address];  Service: Ophthalmology;  Laterality: Right;  20min     FAMILY HISTORY:   Family History   Problem Relation Age of Onset    Cataracts Mother     Glaucoma Mother     Macular degeneration Mother     Diabetes Father     Cancer Father 75        Colon    Cataracts Father     Glaucoma Father     Colon cancer Father 70    Cancer Sister     Cancer Brother         prostate cancer    Crohn's disease Paternal Uncle     Inflammatory bowel disease Paternal Uncle     Colon cancer Paternal Grandfather 60    Amblyopia Neg Hx     Blindness Neg Hx     Hypertension Neg Hx     Retinal detachment Neg Hx     Strabismus Neg Hx     Celiac disease Neg Hx     Cirrhosis Neg Hx     Colon polyps Neg Hx     Esophageal cancer Neg Hx     Liver cancer Neg Hx     Liver disease Neg Hx     Rectal cancer Neg Hx     Stomach cancer Neg Hx     Ulcerative colitis Neg Hx     Pancreatitis Neg Hx     Pancreatic cancer Neg Hx     Kidney cancer Neg Hx     Bladder Cancer Neg Hx     Uterine cancer Neg Hx     Ovarian cancer Neg Hx      SOCIAL HISTORY:   Social History     Socioeconomic History    Marital status:     Number of children: 5   Occupational History    Occupation: Owner [employer]   Tobacco Use    Smoking status: Former     Packs/day: 1.00     Years: 20.00     Pack years: 20.00     Types: Cigarettes     Quit date: 2/10/2009     Years since quittin.7    Smokeless tobacco: Former   Substance and Sexual Activity    Alcohol use: Not Currently     Alcohol/week: 8.0 standard drinks     Types: 8 Cans of beer per week     Comment: last drink 1/3/22    Drug use: Yes     Types: Marijuana     Comment: Cannabis occasionally    Sexual activity: Yes     Partners: Female     Comment: Elsy   Social History Narrative    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 Rescue (Patient not taking: Reported on 8/1/2022), Disp: 18 g, Rfl: PRN    atorvastatin (LIPITOR) 40 MG tablet, Take 1 tablet (40 mg total) by mouth every evening. (Patient not taking: Reported on 8/1/2022), Disp: 90 tablet, Rfl: 3    diazePAM (VALIUM) 5 MG tablet, Take 1 tablet (5 mg total) by mouth daily as needed for Anxiety (claustraphobia)., Disp: 30 tablet, Rfl: 0    esomeprazole (NEXIUM) 20 MG capsule, Take 20 mg by mouth before breakfast., Disp: , Rfl:     FLUoxetine 10 MG capsule, Take 1 capsule (10 mg total) by mouth once daily., Disp: 30 capsule, Rfl: 2    hydrOXYzine pamoate (VISTARIL) 25 MG Cap, TAKE 1 CAPSULE (25 MG TOTAL) BY MOUTH AS NEEDED (ANXIETY) DAILY, Disp: 90 capsule, Rfl: 1    latanoprost 0.005 % ophthalmic solution, Place 1 drop into both eyes once daily., Disp: 2.5 mL, Rfl: 12    mesalamine (CANASA) 1000 MG Supp, Place 1 suppository (1,000 mg total) rectally nightly. (Patient not taking: No sig reported), Disp: 30 suppository, Rfl: 5  ALLERGIES: Review of patient's allergies indicates:  No Known Allergies    VITAL SIGNS: BP (!) 141/75   Pulse 71   Ht 5' 4" (1.626 m)   Wt 75.8 kg (167 lb)   BMI 28.67 kg/m²      Review of Systems   Constitution: Negative. Negative for chills, fever and night sweats.   HENT: Negative for congestion and headaches.    Eyes: Negative for blurred vision, left vision loss and right vision loss.   Cardiovascular: Negative for chest pain and syncope.   Respiratory: Negative for cough and shortness of breath.    Endocrine: Negative for polydipsia, polyphagia and polyuria.   Hematologic/Lymphatic: Negative for bleeding problem. Does not bruise/bleed easily.   Skin: Negative for dry skin, itching and rash.   Musculoskeletal: Negative for falls and muscle weakness.   Gastrointestinal: Negative for abdominal " pain and bowel incontinence.   Genitourinary: Negative for bladder incontinence and nocturia.   Neurological: Negative for disturbances in coordination, loss of balance and seizures.   Psychiatric/Behavioral: Negative for depression. The patient does not have insomnia.    Allergic/Immunologic: Negative for hives and persistent infections.       PHYSICAL EXAMINATION:  General:  The patient is alert and oriented x 3.  Mood is pleasant.  Observation of ears, eyes and nose reveal no gross abnormalities.  HEENT: NCAT, sclera nonicteric  Lungs: Respirations are equal and unlabored.  Gait is coordinated. Patient can toe walk and heel walk without difficulty.  Cardiovascular: There are no swelling or varicosities present.   Lymphatic: Negative for adenopathy       Left SHOULDER / UPPER EXTREMITY EXAM    OBSERVATION:     Swelling  none  Deformity  none   Discoloration  none   Scapular winging none   Scars   none  Atrophy  none    TENDERNESS / CREPITUS (T/C):          T/C      T/C   Clavicle   -/-  SUPRAspinatus    -/-   AC Jt.    -/-  INFRAspinatus  -/-   SC Jt.    -/-  Deltoid    -/-   G. Tuberosity  -/-  LH BICEP groove  +/-   Acromion:  -/-  Midline Neck   -/-   Scapular Spine -/-  Trapezium   -/-   SMA Scapula  -/-  GH jt. line - post  -/-   Scapulothoracic  -/-         ROM: (* = with pain)  Right shoulder   Left shoulder        AROM (PROM)   AROM (PROM)   FE    170° (175°)     170° (175°)     ER at 0°    60°  (65°)    60°  (65°)   ER at 90° ABD  90°  (90°)    90°  (90°)   IR at 90°  ABD   NA  (40°)     NA  (40°)      IR (spine level)   T10     T10    STRENGTH: (* = with pain) RIGHT SHOULDER  LEFT SHOULDER   SCAPTION at 0   5/5    4/5    SCAPTION at 30   5/5    5/5    IR    5/5    5/5   ER    5/5    5/5   BICEPS   5/5    5/5   Deltoid    5/5    5/5     SIGNS:  Painful side       NEER   +   OREGINAS  +   PETE   +   SPEEDS  Neg   DROP ARM   neg   BELLY PRESS Neg   Superior escape none    LIFT-OFF  Neg   X-Body ADD     neg    MOVING VALGUS Neg      STABILITY TESTING    RIGHT SHOULDER   LEFT SHOULDER       Translation    Anterior  up face     up face    Posterior  up face    up face    Sulcus   < 10mm    < 10 mm    Signs    Apprehension   neg      Neg    Relocation   no change     no change    Jerk test  neg     Neg      EXTREMITY NEURO-VASCULAR EXAM    Sensation grossly intact to light touch all dermatomal regions.    DTR 2+ Biceps, Triceps, BR and Negative Ronnys sign   Grossly intact motor function at Elbow, Wrist and Hand   Distal pulses radial and ulnar 2+, brisk cap refill, symmetric.      NECK:  Painless FROM and spinous processes non-tender. Negative Spurlings sign.      OTHER FINDINGS:    XRAYS:  Shoulder trauma series right,  were ordered and reviewed by me. No convincing fracture or dislocation is noted. The osseous structures appear well mineralized and well aligned    1. Shoulder pain, left     Plan:       ASSESSMENT:  Left bursal sided RTC tear.  L shoulder CSI performed today during clinic  PT ordered  Recommend voltaren gel as patient has hx of PUD  Educated patient that if in the event he would like surgery after his injection he would need to wait 3 months after due to the increased risk of infection.  F/u 3 months or earlier if patient would like to pursue surgical intervention     Left subacromial bursal corticosteroid injection    1. PROCEDURE NOTE: After time out was performed and patient ID, side, and site were verified, the  left shoulder  was sterilly prepped in the standard fashion.  A 22-gauge needle was introduced into the joint from a posterior approach without complication. The shoulder was then injected with 2.5cc 1%lidocane, and 2.5cc 0.25% marcaine and 40mg kenalog.  Sterile dressing was applied.  The patient tolerated the procedure well with some immediate relief.

## 2022-11-02 NOTE — PROCEDURES
Large Joint Aspiration/Injection: L subacromial bursa    Date/Time: 11/2/2022 2:00 PM  Performed by: Alejandra Flores MD  Authorized by: Aeljandra Flores MD     Consent Done?:  Yes (Verbal)  Indications:  Pain  Site marked: the procedure site was marked    Timeout: prior to procedure the correct patient, procedure, and site was verified    Prep: patient was prepped and draped in usual sterile fashion      Details:  Needle Size:  22 G  Approach:  Posterior  Location:  Shoulder  Site:  L subacromial bursa  Medications:  80 mg triamcinolone acetonide 40 mg/mL  Patient tolerance:  Patient tolerated the procedure well with no immediate complications

## 2022-11-02 NOTE — H&P
Azam Bowden   is here for a completion of his perioperative paperwork. he  Is scheduled to undergo     left              a. Shoulder arthroscopic rotator cuff repair versus debridement with Cuffmend              b. Shoulder arthroscopic SAD              c. Shoulder arthroscopic extensive debridement              d. Shoulder arthroscopic biceps tenodesis               e. Shoulder arthroscopic possible microfracture              f.  Shoulder open reduction internal fixation greater tuberosity on 11/3/22.     Patient has decided to delay surgery and try a shoulder CSI. He will let us know if he wishes to reschedule.      He is a healthy individual and does need clearance for this procedure which he has not received yet.     PAST MEDICAL HISTORY:   Past Medical History:   Diagnosis Date    Anxiety     GERD (gastroesophageal reflux disease)     Glaucoma     History of alcohol abuse     Ulcerative colitis     Ulcerative colitis      PAST SURGICAL HISTORY:   Past Surgical History:   Procedure Laterality Date    anal fistula      CATARACT EXTRACTION W/  INTRAOCULAR LENS IMPLANT Bilateral n/a    done outside [facility]    CATARACT EXTRACTION, BILATERAL      COLONOSCOPY  2016    ulcerative colitis    COLONOSCOPY N/A 1/28/2022    Procedure: COLONOSCOPY;  Surgeon: Philip Collins MD;  Location: [facility] (4TH FLR);  Service: Endoscopy;  Laterality: N/A;  First provider available for hematochezia and red blood mixed in stools heme-positive stools  rapid 630am-inst handed-tb    TONSILLECTOMY      VITRECTOMY BY PARS PLANA APPROACH Left 11/28/2018    Procedure: VITRECTOMY, PARS PLANA APPROACH;  Surgeon: DIANDRA Rushing MD;  Location: [facility] OR [address] [place] [address];  Service: Ophthalmology;  Laterality: Left;  20 min    VITRECTOMY BY PARS PLANA APPROACH Right 12/12/2018    Procedure: VITRECTOMY, PARS PLANA APPROACH;  Surgeon: DIANDRA Rushing MD;  Location: [facility] OR [address] [place] [address];  Service: Ophthalmology;  Laterality: Right;  20min      FAMILY HISTORY:   Family History   Problem Relation Age of Onset    Cataracts Mother     Glaucoma Mother     Macular degeneration Mother     Diabetes Father     Cancer Father 75        Colon    Cataracts Father     Glaucoma Father     Colon cancer Father 70    Cancer Sister     Cancer Brother         prostate cancer    Crohn's disease Paternal Uncle     Inflammatory bowel disease Paternal Uncle     Colon cancer Paternal Grandfather 60    Amblyopia Neg Hx     Blindness Neg Hx     Hypertension Neg Hx     Retinal detachment Neg Hx     Strabismus Neg Hx     Celiac disease Neg Hx     Cirrhosis Neg Hx     Colon polyps Neg Hx     Esophageal cancer Neg Hx     Liver cancer Neg Hx     Liver disease Neg Hx     Rectal cancer Neg Hx     Stomach cancer Neg Hx     Ulcerative colitis Neg Hx     Pancreatitis Neg Hx     Pancreatic cancer Neg Hx     Kidney cancer Neg Hx     Bladder Cancer Neg Hx     Uterine cancer Neg Hx     Ovarian cancer Neg Hx      SOCIAL HISTORY:   Social History     Socioeconomic History    Marital status:     Number of children: 5   Occupational History    Occupation: Owner [employer]   Tobacco Use    Smoking status: Former     Packs/day: 1.00     Years: 20.00     Pack years: 20.00     Types: Cigarettes     Quit date: 2/10/2009     Years since quittin.7    Smokeless tobacco: Former   Substance and Sexual Activity    Alcohol use: Not Currently     Alcohol/week: 8.0 standard drinks     Types: 8 Cans of beer per week     Comment: last drink 1/3/22    Drug use: Yes     Types: Marijuana     Comment: Cannabis occasionally    Sexual activity: Yes     Partners: Female     Comment: Elsy   Social History Narrative    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    He is     Has a 16-year-old son who was kicked out of high school today 2022       MEDICATIONS:   Current Outpatient Medications:     diazePAM (VALIUM) 5 MG tablet, Take 1 tablet (5 mg total) by mouth daily as  "needed for Anxiety (claustraphobia)., Disp: 30 tablet, Rfl: 0    esomeprazole (NEXIUM) 20 MG capsule, Take 20 mg by mouth before breakfast., Disp: , Rfl:     FLUoxetine 10 MG capsule, Take 1 capsule (10 mg total) by mouth once daily., Disp: 30 capsule, Rfl: 2    hydrOXYzine pamoate (VISTARIL) 25 MG Cap, TAKE 1 CAPSULE (25 MG TOTAL) BY MOUTH AS NEEDED (ANXIETY) DAILY, Disp: 90 capsule, Rfl: 1    latanoprost 0.005 % ophthalmic solution, Place 1 drop into both eyes once daily., Disp: 2.5 mL, Rfl: 12    albuterol (PROAIR HFA) 90 mcg/actuation inhaler, Inhale 2 puffs into the lungs every 6 (six) hours as needed for Wheezing or Shortness of Breath. Rescue (Patient not taking: Reported on 8/1/2022), Disp: 18 g, Rfl: PRN    atorvastatin (LIPITOR) 40 MG tablet, Take 1 tablet (40 mg total) by mouth every evening. (Patient not taking: Reported on 8/1/2022), Disp: 90 tablet, Rfl: 3    mesalamine (CANASA) 1000 MG Supp, Place 1 suppository (1,000 mg total) rectally nightly. (Patient not taking: No sig reported), Disp: 30 suppository, Rfl: 5  ALLERGIES: Review of patient's allergies indicates:  No Known Allergies    VITAL SIGNS: BP (!) 144/70   Pulse 65   Ht 5' 4" (1.626 m)   Wt 74.4 kg (164 lb)   BMI 28.15 kg/m²      Risks, indications and benefits of the surgical procedure were discussed with the patient. All questions with regard to surgery, rehab, expected return to functional activities, activities of daily living and recreational endeavors were answered to his satisfaction.    It was explained to the patient that there may be an increase in surgical risks if the patient has certain co-morbidities such as but not limited to: Obesity, Cardiovascular issues (CHF, CAD, Arrhythmias), chronic pulmonary issues, previous or current neurovascular/neurological issues, previous strokes, diabetes mellitus, previous wound healing issues, previous wound or skin infections, PVD, clotting disorders, if the patient uses chronic steroids, " if the patient takes or has immune compromising medications or diseases, or has previously or currently used tobacco products.     The patient verbalized that he/she does not have any additional clotting, bleeding, or blood disorders, other than what is list in her chart on today's review.     Then a brief history and physical exam were performed.    Review of Systems   Constitution: Negative. Negative for chills, fever and night sweats.   HENT: Negative for congestion and headaches.    Eyes: Negative for blurred vision, left vision loss and right vision loss.   Cardiovascular: Negative for chest pain and syncope.   Respiratory: Negative for cough and shortness of breath.    Endocrine: Negative for polydipsia, polyphagia and polyuria.   Hematologic/Lymphatic: Negative for bleeding problem. Does not bruise/bleed easily.   Skin: Negative for dry skin, itching and rash.   Musculoskeletal: Negative for falls and muscle weakness.   Gastrointestinal: Negative for abdominal pain and bowel incontinence.   Genitourinary: Negative for bladder incontinence and nocturia.   Neurological: Negative for disturbances in coordination, loss of balance and seizures.   Psychiatric/Behavioral: Negative for depression. The patient does not have insomnia.    Allergic/Immunologic: Negative for hives and persistent infections.     PHYSICAL EXAM:  GEN: A&Ox3, WD WN NAD  HEENT: WNL  CHEST: CTAB, no W/R/R  HEART: RRR, no M/R/G  ABD: Soft, NT ND, BS x4 QUADS  MS; See Epic  NEURO: CN II-XII intact       The surgical consent was then reviewed with the patient, who agreed with all the contents of the consent form and it was signed. he was then given the surgery packet to bring with him to surgery [place] for the anesthesia portion of his perioperative paperwork (if needed)   For all physicians except for Dr. Alexander, we will email and possibly fax the consent forms and booking sheets to [facility].    The patient was given the  opportunity to ask questions about the surgical plan and consent form, and once no other questions were asked, I proceeded with the pre-op appointment.    PHYSICAL THERAPY:  He was also instructed regarding physical therapy and will begin on  likely 7-10 days post-op. He was given a copy of the original prescription to schedule. Another copy of this prescription was also faxed to [facility] when he reschedules surgery. Possibly Germain Putnam PT.    POST OP CARE:instructions were reviewed including care of the wound and dressing after surgery and when he can shower. Patient was told not sleep or lay on there surgical extremity following surgery as this could cause repair damage, tissue damage, or nerve injury.    An extensive amount of time was spent on discussion of the following information based on what type of surgery the patient was having. Patient expressed understanding of the material below:    Shoulder surgery or upper extremity surgery requiring post-op sling:  It was explained to the patient that they should remove their arm from the sling approximately 6 times per day to do full elbow ROM (flexion and extension) and full supination and pronation of the elbow for approximately 5 minutes at a time to help prevent elbow stiffness, nerve pain or problems, or nerve injury. They were told to contact us if they begin having numbness and tingling of there surgical extremity that persists longer then 1 day without relief.     Extremity surgery requiring a splint:   It was explained to patient on how to properly elevated position there extremity to prevent pressure ulcers from occurring. I made sure that the patient understood that that surgical site may be numb following surgery and prevent them from feeling pressure pain that they would normal feel if a pressure injury was occurring. Pressure ulcers and there causes were discussed with the patient today.     Post-operative splint:  It was explain to the patient that they  can contact us at anytime if they feel that there is a problem with their splint or under their splint that needs evaluation. If there is concern, questions, or discomfort with the splint then they can present to either our clinic or the [facility] ED for removal, evaluation, and replacement of the splint.    CRUTCHES OR WALKER: It was explained to the patient that if they are having a lower extremity surgery that they will require either a walker or crutches to ambulate safely with after surgery. It was explained that a cane or other assistive devices are not sufficient to safely ambulate with after surgery. I explained to the patient that I will place an order for them to receive either crutches or a walker after surgery to go home with. It was explained that if they have crutches or a walker at home already, that they are REQUIRED to bring them to the hospital on the day of surgery. It was explained that if they do not have them at the hospital on the day of surgery that they WILL be provided a new pair or crutches or a walker to go home with to ensure ambulation will be safe if the patient needs to stop somewhere on the way home.      PAIN MANAGEMENT: Azam Bowden Jr. was also given a pain management regime, which includes the option of getting a TENS unit given to him by [facility] along with the education required for its use. He was also instructed regarding the Polar ice unit or gel ice packs that will be in place after surgery and his postoperative pain medications.     PAIN MEDICATION:  Percocet 10/325mg 1 po q 4-6 hours prn pain  Ultram 50 mg Take 1-2 p.o. q.6 hours p.r.n. breakthrough pain,   Phenergan 25 mg one p.o. q.6 hours p.r.n. nausea and vomiting.    DVT prophylaxis was discussed with the patient today including risk factors for developing DVTs and history of DVTs. The patient was asked if any specific recommendations were given from the doctor/s that did pre-operative surgical  clearance. The patient was then given an education sheet about DVTs and PE with warning signs and symptoms of both and steps to take if they suspect either of these.    This along with the Modified Caprini risk assessment model for VTE in general surgical patients was used to determine the patient's DVT risk.     From: Ernestine ECHEVARRIA, Kamar DA, Libby BARNES, et al. Prevention of VTE in nonorthopedic surgical patients: antithrombotic therapy and prevention of thrombosis, 9th ed: American College of Chest Physicians evidence-based clinical practical guidelines. Chest 2012; 141:e227S. Copyright © 2012. Reproduced with permission from the American College of Chest Physicians.    The below listed DVT prophylaxis regimen along with bilateral SUSU compression stockings will be used post-op. Length of treatment has been determined to be 10-42 days post-op by the above noted Caprini assessment model.     The patient was instructed to buy and take:  Aspirin 81mg QD x 4 weeks for DVT prophylaxis starting on the morning after surgery.  Patient will also use bilateral TEDs on lower extremities, SCDs during surgery, and early ambulation post-op. If the patient was previously taking 81mg baby aspirin, they were told to not take it starting 5 days prior to surgery and to restart the 81mg aspirin after surgery.       Patient was also told to buy over the counter Prilosec medication if needed and take it once daily for GI protection as long as they are taking NSAIDs or Aspirin.   Patient denies history of seizures.      The patient was told that narcotic pain medications may make them drowsy and instructions were given to not sign legal documents, drive or operate heavy machinery, cars, or equipment while under the influence of narcotic medications. The patient was told and understands that narcotic pain medications should only be used as needed to control pain and that other options of pain control include TENs unit and ice packs/unit.     As  there were no other questions to be asked, he was given my business card along with Alejandra Flores MD business card if he has any questions or concerns prior to surgery or in the postop period.

## 2022-11-07 ENCOUNTER — PATIENT MESSAGE (OUTPATIENT)
Dept: INTERNAL MEDICINE | Facility: CLINIC | Age: 62
End: 2022-11-07
Payer: COMMERCIAL

## 2022-11-15 ENCOUNTER — PATIENT MESSAGE (OUTPATIENT)
Dept: INTERNAL MEDICINE | Facility: CLINIC | Age: 62
End: 2022-11-15
Payer: COMMERCIAL

## 2022-11-15 ENCOUNTER — TELEPHONE (OUTPATIENT)
Dept: SPORTS MEDICINE | Facility: CLINIC | Age: 62
End: 2022-11-15
Payer: COMMERCIAL

## 2022-11-15 NOTE — TELEPHONE ENCOUNTER
I called the patient twice with no answer and left a voicemail asking that he return the call to discuss rescheduling his 6 week post op appointment as he sent in a cancellation request.

## 2022-11-21 ENCOUNTER — PATIENT MESSAGE (OUTPATIENT)
Dept: INTERNAL MEDICINE | Facility: CLINIC | Age: 62
End: 2022-11-21
Payer: COMMERCIAL

## 2022-11-28 ENCOUNTER — OFFICE VISIT (OUTPATIENT)
Dept: INTERNAL MEDICINE | Facility: CLINIC | Age: 62
End: 2022-11-28
Payer: COMMERCIAL

## 2022-11-28 VITALS
HEART RATE: 100 BPM | HEIGHT: 64 IN | BODY MASS INDEX: 28 KG/M2 | OXYGEN SATURATION: 97 % | SYSTOLIC BLOOD PRESSURE: 132 MMHG | DIASTOLIC BLOOD PRESSURE: 80 MMHG | WEIGHT: 164 LBS

## 2022-11-28 DIAGNOSIS — F40.240 CLAUSTROPHOBIA: ICD-10-CM

## 2022-11-28 DIAGNOSIS — Z00.00 ANNUAL PHYSICAL EXAM: Primary | ICD-10-CM

## 2022-11-28 DIAGNOSIS — E78.5 DYSLIPIDEMIA: ICD-10-CM

## 2022-11-28 DIAGNOSIS — F32.A DEPRESSION, UNSPECIFIED DEPRESSION TYPE: ICD-10-CM

## 2022-11-28 DIAGNOSIS — K21.9 GASTROESOPHAGEAL REFLUX DISEASE, UNSPECIFIED WHETHER ESOPHAGITIS PRESENT: ICD-10-CM

## 2022-11-28 DIAGNOSIS — F41.9 ANXIETY: ICD-10-CM

## 2022-11-28 PROCEDURE — 3079F PR MOST RECENT DIASTOLIC BLOOD PRESSURE 80-89 MM HG: ICD-10-PCS | Mod: CPTII,S$GLB,, | Performed by: FAMILY MEDICINE

## 2022-11-28 PROCEDURE — 99999 PR PBB SHADOW E&M-EST. PATIENT-LVL III: ICD-10-PCS | Mod: PBBFAC,,, | Performed by: FAMILY MEDICINE

## 2022-11-28 PROCEDURE — 3079F DIAST BP 80-89 MM HG: CPT | Mod: CPTII,S$GLB,, | Performed by: FAMILY MEDICINE

## 2022-11-28 PROCEDURE — 1159F MED LIST DOCD IN RCRD: CPT | Mod: CPTII,S$GLB,, | Performed by: FAMILY MEDICINE

## 2022-11-28 PROCEDURE — 3008F PR BODY MASS INDEX (BMI) DOCUMENTED: ICD-10-PCS | Mod: CPTII,S$GLB,, | Performed by: FAMILY MEDICINE

## 2022-11-28 PROCEDURE — 3075F SYST BP GE 130 - 139MM HG: CPT | Mod: CPTII,S$GLB,, | Performed by: FAMILY MEDICINE

## 2022-11-28 PROCEDURE — 3075F PR MOST RECENT SYSTOLIC BLOOD PRESS GE 130-139MM HG: ICD-10-PCS | Mod: CPTII,S$GLB,, | Performed by: FAMILY MEDICINE

## 2022-11-28 PROCEDURE — 1159F PR MEDICATION LIST DOCUMENTED IN MEDICAL RECORD: ICD-10-PCS | Mod: CPTII,S$GLB,, | Performed by: FAMILY MEDICINE

## 2022-11-28 PROCEDURE — 99396 PR PREVENTIVE VISIT,EST,40-64: ICD-10-PCS | Mod: S$GLB,,, | Performed by: FAMILY MEDICINE

## 2022-11-28 PROCEDURE — 3008F BODY MASS INDEX DOCD: CPT | Mod: CPTII,S$GLB,, | Performed by: FAMILY MEDICINE

## 2022-11-28 PROCEDURE — 99396 PREV VISIT EST AGE 40-64: CPT | Mod: S$GLB,,, | Performed by: FAMILY MEDICINE

## 2022-11-28 PROCEDURE — 99999 PR PBB SHADOW E&M-EST. PATIENT-LVL III: CPT | Mod: PBBFAC,,, | Performed by: FAMILY MEDICINE

## 2022-11-28 RX ORDER — ATORVASTATIN CALCIUM 40 MG/1
40 TABLET, FILM COATED ORAL NIGHTLY
Qty: 90 TABLET | Refills: 3 | Status: SHIPPED | OUTPATIENT
Start: 2022-11-28 | End: 2023-02-22 | Stop reason: SDUPTHER

## 2022-11-28 RX ORDER — HYDROGEN PEROXIDE 3 %
20 SOLUTION, NON-ORAL MISCELLANEOUS
Qty: 90 CAPSULE | Refills: 3 | Status: SHIPPED | OUTPATIENT
Start: 2022-11-28 | End: 2023-02-22 | Stop reason: SDUPTHER

## 2022-11-28 RX ORDER — ESCITALOPRAM OXALATE 20 MG/1
20 TABLET ORAL DAILY
Qty: 90 TABLET | Refills: 3 | Status: SHIPPED | OUTPATIENT
Start: 2022-11-28 | End: 2023-01-09

## 2022-11-28 RX ORDER — DIAZEPAM 5 MG/1
5 TABLET ORAL DAILY PRN
Qty: 30 TABLET | Refills: 0 | Status: SHIPPED | OUTPATIENT
Start: 2022-11-28 | End: 2023-01-09

## 2022-11-28 NOTE — PROGRESS NOTES
Subjective:       Patient ID: Azam Bowden Jr. is a 62 y.o. male.    Chief Complaint: Annual Exam    Patient is here for annmual exam    Pt says he is depressed and anxious and in bad shape psychologically.  Denies being a danger to himself or others - we discussed he can at any time go to ER if he does feel that way and they can arrange inpt admission pending their evaluation.  His wife and son left him to get time apart from him - unclear if it is temporary or more long term  He says he no longer has any friends - they have all  in motorcycle accidents.  His parents live on [facility] and his 2 bothers live near them - his 2 sisters live in [facility] [place] - he says relations hip is not good with any of them, not any family members he can talk to about how he feels.  Not involved in a Rastafarian  He is newly living in [place], does not know any of his neighbors.  He has no guns in house, but he does have them in his [place] house and will eave them there.  He reports the medications psychiatry gave him (hydroxyzine, prozac) did not help him; he says the occasional valium he used in past did help him. He is willing to try lexapro 20mg.   He does not want to talk with talk therapist or psychiatry  He does drink alcohol, about 4 beers a night - we discussed the less the better    He reports >15 years since stopped smoking, so no longer needs annual LDCT  He stopped taking statin, after reviewing risk below he agrees to restart  The 10-year ASCVD risk score (Shira DK, et al., 2019) is: 14.2%    Values used to calculate the score:      Age: 62 years      Sex: Male      Is Non- : No      Diabetic: No      Tobacco smoker: No      Systolic Blood Pressure: 140 mmHg      Is BP treated: No      HDL Cholesterol: 54 mg/dL      Total Cholesterol: 224 mg/dL      Review of patient's allergies indicates:  No Known Allergies    Social History     Tobacco Use    Smoking status: Former     Packs/day: 1.00      Years: 20.00     Pack years: 20.00     Types: Cigarettes     Quit date: 2/10/2009     Years since quittin.8    Smokeless tobacco: Former   Substance Use Topics    Alcohol use: Not Currently     Alcohol/week: 8.0 standard drinks     Types: 8 Cans of beer per week     Comment: last drink 1/3/22    Drug use: Yes     Types: Marijuana     Comment: Cannabis occasionally       Family History   Problem Relation Age of Onset    Cataracts Mother     Glaucoma Mother     Macular degeneration Mother     Diabetes Father     Cancer Father 75        Colon    Cataracts Father     Glaucoma Father     Colon cancer Father 70    Cancer Sister     Cancer Brother         prostate cancer    Crohn's disease Paternal Uncle     Inflammatory bowel disease Paternal Uncle     Colon cancer Paternal Grandfather 60    Amblyopia Neg Hx     Blindness Neg Hx     Hypertension Neg Hx     Retinal detachment Neg Hx     Strabismus Neg Hx     Celiac disease Neg Hx     Cirrhosis Neg Hx     Colon polyps Neg Hx     Esophageal cancer Neg Hx     Liver cancer Neg Hx     Liver disease Neg Hx     Rectal cancer Neg Hx     Stomach cancer Neg Hx     Ulcerative colitis Neg Hx     Pancreatitis Neg Hx     Pancreatic cancer Neg Hx     Kidney cancer Neg Hx     Bladder Cancer Neg Hx     Uterine cancer Neg Hx     Ovarian cancer Neg Hx        Past Surgical History:   Procedure Laterality Date    anal fistula      CATARACT EXTRACTION W/  INTRAOCULAR LENS IMPLANT Bilateral n/a    done outside [facility]    CATARACT EXTRACTION, BILATERAL      COLONOSCOPY      ulcerative colitis    COLONOSCOPY N/A 2022    Procedure: COLONOSCOPY;  Surgeon: Philip Collins MD;  Location: [facility] ([address] [place] [address]);  Service: Endoscopy;  Laterality: N/A;  First provider available for hematochezia and red blood mixed in stools heme-positive stools  rapid 630am-inst handed-tb    TONSILLECTOMY      VITRECTOMY BY PARS PLANA APPROACH Left 2018    Procedure: VITRECTOMY, PARS PLANA  APPROACH;  Surgeon: DIANDRA Rushing MD;  Location: [facility] OR [address] [place] [address];  Service: Ophthalmology;  Laterality: Left;  20 min    VITRECTOMY BY PARS PLANA APPROACH Right 12/12/2018    Procedure: VITRECTOMY, PARS PLANA APPROACH;  Surgeon: DIANDRA Rushing MD;  Location: [facility] OR [address] [place] [address];  Service: Ophthalmology;  Laterality: Right;  20min       Patient Active Problem List   Diagnosis    Ulcerative colitis    Dyspnea    Hx of tobacco use, presenting hazards to health    Posterior vitreous detachment, bilateral    Other vitreous opacities, bilateral    Other vitreous opacities, left eye    Other vitreous opacities, right eye    Glaucoma suspect of both eyes    Anxiety    GERD (gastroesophageal reflux disease)    Acute pain of right shoulder    Dyslipidemia    Elevated BP without diagnosis of hypertension    COPD (chronic obstructive pulmonary disease)       Current Outpatient Medications on File Prior to Visit   Medication Sig Dispense Refill    albuterol (PROAIR HFA) 90 mcg/actuation inhaler Inhale 2 puffs into the lungs every 6 (six) hours as needed for Wheezing or Shortness of Breath. Rescue (Patient not taking: Reported on 8/1/2022) 18 g PRN    atorvastatin (LIPITOR) 40 MG tablet Take 1 tablet (40 mg total) by mouth every evening. (Patient not taking: Reported on 8/1/2022) 90 tablet 3    diazePAM (VALIUM) 5 MG tablet Take 1 tablet (5 mg total) by mouth daily as needed for Anxiety (claustraphobia). 30 tablet 0    esomeprazole (NEXIUM) 20 MG capsule Take 20 mg by mouth before breakfast.      FLUoxetine 10 MG capsule Take 1 capsule (10 mg total) by mouth once daily. 30 capsule 2    hydrOXYzine pamoate (VISTARIL) 25 MG Cap TAKE 1 CAPSULE (25 MG TOTAL) BY MOUTH AS NEEDED (ANXIETY) DAILY 90 capsule 1    latanoprost 0.005 % ophthalmic solution Place 1 drop into both eyes once daily. 2.5 mL 12    mesalamine (CANASA) 1000 MG Supp Place 1 suppository (1,000 mg total) rectally nightly. (Patient not taking: No sig reported)  "30 suppository 5     No current facility-administered medications on file prior to visit.           Review of Systems   Constitutional:  Negative for chills and fever.   HENT:  Negative for ear pain.    Eyes:  Negative for pain.   Respiratory:  Negative for chest tightness.    Cardiovascular:  Negative for chest pain.   Gastrointestinal:  Negative for abdominal pain.   Genitourinary:  Negative for flank pain.   Musculoskeletal:  Negative for gait problem.   Neurological:  Negative for syncope.   Psychiatric/Behavioral:  Positive for dysphoric mood. Negative for behavioral problems, self-injury and suicidal ideas. The patient is nervous/anxious.      Objective:    /80   Pulse 100   Ht 5' 4" (1.626 m)   Wt 74.4 kg (164 lb)   SpO2 97%   BMI 28.15 kg/m²     Physical Exam  Constitutional:       Appearance: He is well-developed.   HENT:      Head: Normocephalic and atraumatic.   Cardiovascular:      Rate and Rhythm: Normal rate.      Heart sounds: Normal heart sounds.   Pulmonary:      Effort: No respiratory distress.      Breath sounds: Normal breath sounds. No wheezing or rales.   Abdominal:      Palpations: Abdomen is soft.   Musculoskeletal:      Cervical back: Neck supple.   Skin:     General: Skin is dry.   Neurological:      Mental Status: He is alert.   Psychiatric:         Behavior: Behavior normal.       Assessment:       1. Annual physical exam    2. Dyslipidemia    3. Claustrophobia    4. Anxiety    5. Gastroesophageal reflux disease, unspecified whether esophagitis present    6. Depression, unspecified depression type          Plan:       Azam was seen today for annual exam.    Diagnoses and all orders for this visit:    Annual physical exam    Dyslipidemia  -     atorvastatin (LIPITOR) 40 MG tablet; Take 1 tablet (40 mg total) by mouth every evening.    Claustrophobia  -     diazePAM (VALIUM) 5 MG tablet; Take 1 tablet (5 mg total) by mouth daily as needed for Anxiety (claustraphobia). #30, no " RF    Anxiety  -     diazePAM (VALIUM) 5 MG tablet; Take 1 tablet (5 mg total) by mouth daily as needed for Anxiety (claustraphobia).  -     EScitalopram oxalate (LEXAPRO) 20 MG tablet; Take 1 tablet (20 mg total) by mouth once daily.    Gastroesophageal reflux disease, unspecified whether esophagitis present  -     esomeprazole (NEXIUM) 20 MG capsule; Take 1 capsule (20 mg total) by mouth before breakfast. (He has bene using OTC)    Depression, unspecified depression type  -     EScitalopram oxalate (LEXAPRO) 20 MG tablet; Take 1 tablet (20 mg total) by mouth once daily.    Follow up in about 6 weeks (around 1/9/2023) for anxiety, depression.  Lab tests at next visit after he is back on statin drug

## 2022-12-05 ENCOUNTER — OFFICE VISIT (OUTPATIENT)
Dept: OPHTHALMOLOGY | Facility: CLINIC | Age: 62
End: 2022-12-05
Payer: COMMERCIAL

## 2022-12-05 DIAGNOSIS — H40.1131 PRIMARY OPEN-ANGLE GLAUCOMA, BILATERAL, MILD STAGE: Primary | ICD-10-CM

## 2022-12-05 DIAGNOSIS — Z98.890 H/O VITRECTOMY: ICD-10-CM

## 2022-12-05 DIAGNOSIS — Z96.1 PSEUDOPHAKIA OF BOTH EYES: ICD-10-CM

## 2022-12-05 DIAGNOSIS — H02.413 ACQUIRED MECHANICAL PTOSIS OF EYELID, BILATERAL: ICD-10-CM

## 2022-12-05 PROCEDURE — 92012 PR EYE EXAM, EST PATIENT,INTERMED: ICD-10-PCS | Mod: S$GLB,,, | Performed by: OPHTHALMOLOGY

## 2022-12-05 PROCEDURE — 1160F PR REVIEW ALL MEDS BY PRESCRIBER/CLIN PHARMACIST DOCUMENTED: ICD-10-PCS | Mod: CPTII,S$GLB,, | Performed by: OPHTHALMOLOGY

## 2022-12-05 PROCEDURE — 1159F PR MEDICATION LIST DOCUMENTED IN MEDICAL RECORD: ICD-10-PCS | Mod: CPTII,S$GLB,, | Performed by: OPHTHALMOLOGY

## 2022-12-05 PROCEDURE — 1159F MED LIST DOCD IN RCRD: CPT | Mod: CPTII,S$GLB,, | Performed by: OPHTHALMOLOGY

## 2022-12-05 PROCEDURE — 92012 INTRM OPH EXAM EST PATIENT: CPT | Mod: S$GLB,,, | Performed by: OPHTHALMOLOGY

## 2022-12-05 PROCEDURE — 92020 GONIOSCOPY: CPT | Mod: S$GLB,,, | Performed by: OPHTHALMOLOGY

## 2022-12-05 PROCEDURE — 1160F RVW MEDS BY RX/DR IN RCRD: CPT | Mod: CPTII,S$GLB,, | Performed by: OPHTHALMOLOGY

## 2022-12-05 PROCEDURE — 99999 PR PBB SHADOW E&M-EST. PATIENT-LVL III: CPT | Mod: PBBFAC,,, | Performed by: OPHTHALMOLOGY

## 2022-12-05 PROCEDURE — 92020 PR SPECIAL EYE EVAL,GONIOSCOPY: ICD-10-PCS | Mod: S$GLB,,, | Performed by: OPHTHALMOLOGY

## 2022-12-05 PROCEDURE — 99999 PR PBB SHADOW E&M-EST. PATIENT-LVL III: ICD-10-PCS | Mod: PBBFAC,,, | Performed by: OPHTHALMOLOGY

## 2022-12-05 NOTE — PROGRESS NOTES
HPI    DLS: 8/01/2022    Pt here for 4 Month Check;  Pt states no eye pain or discomfort. Pt states he does notice floaters but   this is nothing new no increase in them. Pt denies any flashes or   diplopia. Pt states he needs more refills on the Latanoprost eye drop.     Meds:  Latanoprost QHS OU    1. POAG OU   2. PCIOL OU   3. Ptosis   4. Hx PPVx OU     Last edited by Amparo Worthy on 12/5/2022 10:09 AM.            Assessment /Plan     For exam results, see Encounter Report.    Primary open-angle glaucoma, bilateral, mild stage    Acquired mechanical ptosis of eyelid, bilateral    Pseudophakia of both eyes    H/O vitrectomy        Lost to F/U from glaucoma clinic from 4/2019 to 2/2022 (3 years)   Has seen Dr Michel (2021) and Dr neumann (2022)      1.   Glaucoma (type and duration)   POAG / mild ou    First HVF   2019    First photos   2019   Treatment / Drops started   Cosopt BID OU - 2019            Family history    +father        Glaucoma meds    Cosopt BID ou         H/O adverse rxn to glaucoma drops    none        LASERS    none        GLAUCOMA SURGERIES    none        OTHER EYE SURGERIES  -  // PC IOL ou - multifocal - ? Where or when     PPVx   OU --s/p 25g PPV/partial AFx OS (11/28/18) --s/p 25g PPV/partial AFx OD (12/12/18) - For PVD w/ floaters ou          CDR    0.5/0.6 (sometimes called 0.3/0.4)         Tbase    16-28 // 16-35         Tmax    28/35            Ttarget    ?? About 21 ou            HVF    2 test 2019 to 2022 -full  od // non  Sp changes  os        Gonio    +3/+3        CCT    554/560        OCT    2 test 2019 to 2022 - RNFL - bord TI  od // nl  os        Disc photos    2019    - Ttoday    19//20 (target about 21 ou)   - Test done today    DFE OCT HVF gonio   2. H/O PPVx ou    For severe floaters - ou - [place]     3. Pseudophakia   Multifocal ou    Monitor    4. Ptosis - vis sign by walt RODRIGUEZ's    nel was willing to operate - but his insurance - blue cross / blue shield - says no , and  so it is not covered by his insurance (2/2022)         PLAN   Change cosopt to latanoprost ou q day - can not remember bid dosing   Doing ok with latanoprsot 12/3/2022 - below target of 21     F/U 6 months with IOP - and OCT

## 2023-01-09 ENCOUNTER — OFFICE VISIT (OUTPATIENT)
Dept: INTERNAL MEDICINE | Facility: CLINIC | Age: 63
End: 2023-01-09
Payer: COMMERCIAL

## 2023-01-09 VITALS
DIASTOLIC BLOOD PRESSURE: 70 MMHG | OXYGEN SATURATION: 98 % | HEART RATE: 68 BPM | HEIGHT: 64 IN | SYSTOLIC BLOOD PRESSURE: 122 MMHG | BODY MASS INDEX: 27.83 KG/M2 | WEIGHT: 163 LBS

## 2023-01-09 DIAGNOSIS — Z00.00 HEALTH CARE MAINTENANCE: ICD-10-CM

## 2023-01-09 DIAGNOSIS — E78.5 DYSLIPIDEMIA: Primary | ICD-10-CM

## 2023-01-09 DIAGNOSIS — J44.9 CHRONIC OBSTRUCTIVE PULMONARY DISEASE, UNSPECIFIED COPD TYPE: ICD-10-CM

## 2023-01-09 DIAGNOSIS — F32.A DEPRESSION, UNSPECIFIED DEPRESSION TYPE: ICD-10-CM

## 2023-01-09 DIAGNOSIS — K51.90 ULCERATIVE COLITIS WITHOUT COMPLICATIONS, UNSPECIFIED LOCATION: ICD-10-CM

## 2023-01-09 DIAGNOSIS — F41.9 ANXIETY: ICD-10-CM

## 2023-01-09 DIAGNOSIS — F40.240 CLAUSTROPHOBIA: ICD-10-CM

## 2023-01-09 DIAGNOSIS — Z12.5 PROSTATE CANCER SCREENING: ICD-10-CM

## 2023-01-09 DIAGNOSIS — R03.0 ELEVATED BP WITHOUT DIAGNOSIS OF HYPERTENSION: ICD-10-CM

## 2023-01-09 PROBLEM — K63.5 POLYP OF COLON: Status: ACTIVE | Noted: 2023-01-09

## 2023-01-09 PROBLEM — K64.9 HEMORRHOIDS: Status: ACTIVE | Noted: 2023-01-09

## 2023-01-09 PROCEDURE — 3074F PR MOST RECENT SYSTOLIC BLOOD PRESSURE < 130 MM HG: ICD-10-PCS | Mod: CPTII,S$GLB,, | Performed by: FAMILY MEDICINE

## 2023-01-09 PROCEDURE — 3008F PR BODY MASS INDEX (BMI) DOCUMENTED: ICD-10-PCS | Mod: CPTII,S$GLB,, | Performed by: FAMILY MEDICINE

## 2023-01-09 PROCEDURE — 3074F SYST BP LT 130 MM HG: CPT | Mod: CPTII,S$GLB,, | Performed by: FAMILY MEDICINE

## 2023-01-09 PROCEDURE — 99999 PR PBB SHADOW E&M-EST. PATIENT-LVL IV: ICD-10-PCS | Mod: PBBFAC,,, | Performed by: FAMILY MEDICINE

## 2023-01-09 PROCEDURE — 99214 PR OFFICE/OUTPT VISIT, EST, LEVL IV, 30-39 MIN: ICD-10-PCS | Mod: S$GLB,,, | Performed by: FAMILY MEDICINE

## 2023-01-09 PROCEDURE — 1159F MED LIST DOCD IN RCRD: CPT | Mod: CPTII,S$GLB,, | Performed by: FAMILY MEDICINE

## 2023-01-09 PROCEDURE — 99999 PR PBB SHADOW E&M-EST. PATIENT-LVL IV: CPT | Mod: PBBFAC,,, | Performed by: FAMILY MEDICINE

## 2023-01-09 PROCEDURE — 3008F BODY MASS INDEX DOCD: CPT | Mod: CPTII,S$GLB,, | Performed by: FAMILY MEDICINE

## 2023-01-09 PROCEDURE — 1159F PR MEDICATION LIST DOCUMENTED IN MEDICAL RECORD: ICD-10-PCS | Mod: CPTII,S$GLB,, | Performed by: FAMILY MEDICINE

## 2023-01-09 PROCEDURE — 3078F DIAST BP <80 MM HG: CPT | Mod: CPTII,S$GLB,, | Performed by: FAMILY MEDICINE

## 2023-01-09 PROCEDURE — 3078F PR MOST RECENT DIASTOLIC BLOOD PRESSURE < 80 MM HG: ICD-10-PCS | Mod: CPTII,S$GLB,, | Performed by: FAMILY MEDICINE

## 2023-01-09 PROCEDURE — 99214 OFFICE O/P EST MOD 30 MIN: CPT | Mod: S$GLB,,, | Performed by: FAMILY MEDICINE

## 2023-01-09 RX ORDER — DIAZEPAM 5 MG/1
5 TABLET ORAL DAILY PRN
Qty: 30 TABLET | Refills: 1 | Status: SHIPPED | OUTPATIENT
Start: 2023-01-09 | End: 2023-07-11 | Stop reason: SDUPTHER

## 2023-01-09 RX ORDER — ESCITALOPRAM OXALATE 10 MG/1
10 TABLET ORAL DAILY
Qty: 90 TABLET | Refills: 3 | Status: SHIPPED | OUTPATIENT
Start: 2023-01-09 | End: 2023-02-22 | Stop reason: SDUPTHER

## 2023-01-09 NOTE — PROGRESS NOTES
"Subjective:       Patient ID: Azam Bowden Jr. is a 62 y.o. male.    Chief Complaint: No chief complaint on file.    Patient is here for follow-up of their chronic diseases, and labs.  Seen  with note below and started on lexapro 20, valium 5mg dialy (anxiety/depression) and atorvastatin 40mg    "Pt says he is depressed and anxious and in bad shape psychologically.  Denies being a danger to himself or others - we discussed he can at any time go to ER if he does feel that way and they can arrange inpt admission pending their evaluation.  His wife and son left him to get time apart from him - unclear if it is temporary or more long term  He says he no longer has any friends - they have all  in motorcycle accidents.  His parents live on [facility] and his 2 bothers live near them - his 2 sisters live in [facility] - he says relations hip is not good with any of them, not any family members he can talk to about how he feels.  Not involved in a Baptism  He is newly living in [place], does not know any of his neighbors.  He has no guns in house, but he does have them in his [place] house and will eave them there.  He reports the medications psychiatry gave him (hydroxyzine, prozac) did not help him; he says the occasional valium he used in past did help him. He is willing to try lexapro 20mg.   He does not want to talk with talk therapist or psychiatry  He does drink alcohol, about 4 beers a night - we discussed the less the better     He reports >15 years since stopped smoking, so no longer needs annual LDCT  He stopped taking statin, after reviewing risk below he agrees to restart  The 10-year ASCVD risk score (Shira DK, et al., 2019) is: 14.2%"  He says heis wife and son are back living with him again    Colonoscopy 22:  "Impression:            - Diverticulosis in the rectum.                          - Proctosigmoid ulcerative colitis. Inflammation                          was found from the rectum " "to the sigmoid colon.                          This was graded as Hair Score 2 (moderate                          disease). Biopsied.                          - The examined portion of the ileum was normal.                          - Diverticulosis in the recto-sigmoid colon, in                          the sigmoid colon and in the descending colon.   Recommendation:        - Patient has a contact number available for                          emergencies. The signs and symptoms of potential                          delayed complications were discussed with the                          patient. Return to normal activities tomorrow.                          Written discharge instructions were provided to                          the patient.                          - Resume previous diet.                          - Continue present medications.                          - Await pathology results.                          - Discharge patient to home.                          - Use Apriso 0.375 g 4 caps PO QAM.                          - Return to GI clinic.                          - Repeat colonoscopy in 6 months for surveillance.                          - Telephone endoscopist for pathology results in 3                          weeks.                          - The findings and recommendations were discussed                          with the patient.                          - Discharge patient to home. "  He could not afford the meds sent in by GI, never had f/u, sill put in referral    Review of patient's allergies indicates:  No Known Allergies    Social History     Tobacco Use    Smoking status: Former     Packs/day: 1.00     Years: 20.00     Pack years: 20.00     Types: Cigarettes     Quit date: 2/10/2009     Years since quittin.9    Smokeless tobacco: Former   Substance Use Topics    Alcohol use: Not Currently     Alcohol/week: 8.0 standard drinks     Types: 8 Cans of beer per week     Comment: last drink " 1/3/22    Drug use: Yes     Types: Marijuana     Comment: Cannabis occasionally       Family History   Problem Relation Age of Onset    Cataracts Mother     Glaucoma Mother     Macular degeneration Mother     Diabetes Father     Cancer Father 75        Colon    Cataracts Father     Glaucoma Father     Colon cancer Father 70    Cancer Sister     Cancer Brother         prostate cancer    Crohn's disease Paternal Uncle     Inflammatory bowel disease Paternal Uncle     Colon cancer Paternal Grandfather 60    Amblyopia Neg Hx     Blindness Neg Hx     Hypertension Neg Hx     Retinal detachment Neg Hx     Strabismus Neg Hx     Celiac disease Neg Hx     Cirrhosis Neg Hx     Colon polyps Neg Hx     Esophageal cancer Neg Hx     Liver cancer Neg Hx     Liver disease Neg Hx     Rectal cancer Neg Hx     Stomach cancer Neg Hx     Ulcerative colitis Neg Hx     Pancreatitis Neg Hx     Pancreatic cancer Neg Hx     Kidney cancer Neg Hx     Bladder Cancer Neg Hx     Uterine cancer Neg Hx     Ovarian cancer Neg Hx        Past Surgical History:   Procedure Laterality Date    anal fistula      CATARACT EXTRACTION W/  INTRAOCULAR LENS IMPLANT Bilateral n/a    done outside [facility]    CATARACT EXTRACTION, BILATERAL      COLONOSCOPY  2016    ulcerative colitis    COLONOSCOPY N/A 1/28/2022    Procedure: COLONOSCOPY;  Surgeon: Philip Collins MD;  Location: [facility] ([address] [place] [address]);  Service: Endoscopy;  Laterality: N/A;  First provider available for hematochezia and red blood mixed in stools heme-positive stools  rapid 630am-inst handed-tb    TONSILLECTOMY      VITRECTOMY BY PARS PLANA APPROACH Left 11/28/2018    Procedure: VITRECTOMY, PARS PLANA APPROACH;  Surgeon: DIANDRA Rushing MD;  Location: [address];  Service: Ophthalmology;  Laterality: Left;  20 min    VITRECTOMY BY PARS PLANA APPROACH Right 12/12/2018    Procedure: VITRECTOMY, PARS PLANA APPROACH;  Surgeon: DIANDRA Rushing MD;  Location: [address];  Service:  Ophthalmology;  Laterality: Right;  20min       Patient Active Problem List   Diagnosis    Ulcerative colitis    Dyspnea    Hx of tobacco use, presenting hazards to health    Posterior vitreous detachment, bilateral    Other vitreous opacities, bilateral    Other vitreous opacities, left eye    Other vitreous opacities, right eye    Glaucoma suspect of both eyes    Anxiety    GERD (gastroesophageal reflux disease)    Acute pain of right shoulder    Dyslipidemia    Elevated BP without diagnosis of hypertension    COPD (chronic obstructive pulmonary disease)       Current Outpatient Medications on File Prior to Visit   Medication Sig Dispense Refill    albuterol (PROAIR HFA) 90 mcg/actuation inhaler Inhale 2 puffs into the lungs every 6 (six) hours as needed for Wheezing or Shortness of Breath. Rescue (Patient not taking: Reported on 8/1/2022) 18 g PRN    atorvastatin (LIPITOR) 40 MG tablet Take 1 tablet (40 mg total) by mouth every evening. 90 tablet 3    diazePAM (VALIUM) 5 MG tablet Take 1 tablet (5 mg total) by mouth daily as needed for Anxiety (claustraphobia). 30 tablet 0    EScitalopram oxalate (LEXAPRO) 20 MG tablet Take 1 tablet (20 mg total) by mouth once daily. 90 tablet 3    esomeprazole (NEXIUM) 20 MG capsule Take 1 capsule (20 mg total) by mouth before breakfast. 90 capsule 3    latanoprost 0.005 % ophthalmic solution Place 1 drop into both eyes once daily. 2.5 mL 12    mesalamine (CANASA) 1000 MG Supp Place 1 suppository (1,000 mg total) rectally nightly. (Patient not taking: No sig reported) 30 suppository 5     No current facility-administered medications on file prior to visit.           Review of Systems   Constitutional:  Negative for chills and fever.   HENT:  Negative for ear pain.    Eyes:  Negative for pain.   Respiratory:  Positive for shortness of breath. Negative for chest tightness.         Sometimes cannot catch his breath. Hx smoking in past and COPD. Never saw pulmonary   Cardiovascular:  " Negative for chest pain.   Gastrointestinal:  Negative for abdominal pain.   Genitourinary:  Negative for flank pain.   Musculoskeletal:  Negative for gait problem.   Neurological:  Negative for syncope.   Psychiatric/Behavioral:  Negative for behavioral problems.      Objective:    /70 (BP Location: Left arm, Patient Position: Sitting, BP Method: Medium (Manual))   Pulse 68   Ht 5' 4" (1.626 m)   Wt 73.9 kg (163 lb)   SpO2 98%   BMI 27.98 kg/m²     Physical Exam  Constitutional:       Appearance: He is well-developed.   HENT:      Head: Normocephalic and atraumatic.   Cardiovascular:      Rate and Rhythm: Normal rate.      Heart sounds: Normal heart sounds.   Pulmonary:      Effort: No respiratory distress.      Breath sounds: Normal breath sounds. No wheezing or rales.   Abdominal:      Palpations: Abdomen is soft.   Musculoskeletal:      Cervical back: Neck supple.   Skin:     General: Skin is dry.   Neurological:      Mental Status: He is alert.   Psychiatric:         Behavior: Behavior normal.       Assessment:       1. Dyslipidemia    2. Elevated BP without diagnosis of hypertension    3. Prostate cancer screening    4. Anxiety    5. Health care maintenance    6. Ulcerative colitis without complications, unspecified location    7. Claustrophobia    8. Depression, unspecified depression type    9. Chronic obstructive pulmonary disease, unspecified COPD type          Plan:       Azam was seen today for follow-up.    Diagnoses and all orders for this visit:    Dyslipidemia  -     Comprehensive Metabolic Panel; Future  -     Lipid Panel; Future  -     LDL Cholesterol, Direct Measurement; Future (not fasting)    Elevated BP without diagnosis of hypertension - will take off problem list, good BP today  -     Comprehensive Metabolic Panel; Future  -     CBC Auto Differential; Future  -     Lipid Panel; Future  -     TSH; Future    Prostate cancer screening  -     PSA, Screening; Future    Anxiety  -    "  TSH; Future  -     diazePAM (VALIUM) 5 MG tablet; Take 1 tablet (5 mg total) by mouth daily as needed for Anxiety (claustraphobia).  -     EScitalopram oxalate (LEXAPRO) 10 MG tablet; Take 1 tablet (10 mg total) by mouth once daily.    Health care maintenance  -     Hemoglobin A1C; Future    Ulcerative colitis without complications, unspecified location  -     Ambulatory referral/consult to Gastroenterology; Future    Claustrophobia  -     diazePAM (VALIUM) 5 MG tablet; Take 1 tablet (5 mg total) by mouth daily as needed for Anxiety (claustraphobia). #30, 1 RF - should last for 6 mo    Depression, unspecified depression type  -     EScitalopram oxalate (LEXAPRO) 10 MG tablet; Take 1 tablet (10 mg total) by mouth once daily. - he has been cutting hte 20mgt in half azs it seems too strong to him    Chronic obstructive pulmonary disease, unspecified COPD type  -     Ambulatory referral/consult to Pulmonology; Future    Follow up in about 6 months (around 7/9/2023) for anxiety.

## 2023-01-10 ENCOUNTER — LAB VISIT (OUTPATIENT)
Dept: LAB | Facility: HOSPITAL | Age: 63
End: 2023-01-10
Attending: FAMILY MEDICINE
Payer: COMMERCIAL

## 2023-01-10 DIAGNOSIS — R03.0 ELEVATED BP WITHOUT DIAGNOSIS OF HYPERTENSION: ICD-10-CM

## 2023-01-10 DIAGNOSIS — Z00.00 HEALTH CARE MAINTENANCE: ICD-10-CM

## 2023-01-10 DIAGNOSIS — Z12.5 PROSTATE CANCER SCREENING: ICD-10-CM

## 2023-01-10 DIAGNOSIS — E78.5 DYSLIPIDEMIA: ICD-10-CM

## 2023-01-10 DIAGNOSIS — F41.9 ANXIETY: ICD-10-CM

## 2023-01-10 LAB
ALBUMIN SERPL BCP-MCNC: 3.7 G/DL (ref 3.5–5.2)
ALP SERPL-CCNC: 42 U/L (ref 55–135)
ALT SERPL W/O P-5'-P-CCNC: 32 U/L (ref 10–44)
ANION GAP SERPL CALC-SCNC: 9 MMOL/L (ref 8–16)
AST SERPL-CCNC: 31 U/L (ref 10–40)
BASOPHILS # BLD AUTO: 0.03 K/UL (ref 0–0.2)
BASOPHILS NFR BLD: 0.5 % (ref 0–1.9)
BILIRUB SERPL-MCNC: 0.6 MG/DL (ref 0.1–1)
BUN SERPL-MCNC: 10 MG/DL (ref 8–23)
CALCIUM SERPL-MCNC: 9.3 MG/DL (ref 8.7–10.5)
CHLORIDE SERPL-SCNC: 106 MMOL/L (ref 95–110)
CHOLEST SERPL-MCNC: 192 MG/DL (ref 120–199)
CHOLEST/HDLC SERPL: 2.6 {RATIO} (ref 2–5)
CO2 SERPL-SCNC: 26 MMOL/L (ref 23–29)
COMPLEXED PSA SERPL-MCNC: 3.5 NG/ML (ref 0–4)
CREAT SERPL-MCNC: 1.1 MG/DL (ref 0.5–1.4)
DIFFERENTIAL METHOD: NORMAL
EOSINOPHIL # BLD AUTO: 0.1 K/UL (ref 0–0.5)
EOSINOPHIL NFR BLD: 0.9 % (ref 0–8)
ERYTHROCYTE [DISTWIDTH] IN BLOOD BY AUTOMATED COUNT: 12.9 % (ref 11.5–14.5)
EST. GFR  (NO RACE VARIABLE): >60 ML/MIN/1.73 M^2
ESTIMATED AVG GLUCOSE: 100 MG/DL (ref 68–131)
GLUCOSE SERPL-MCNC: 89 MG/DL (ref 70–110)
HBA1C MFR BLD: 5.1 % (ref 4–5.6)
HCT VFR BLD AUTO: 47.5 % (ref 40–54)
HDLC SERPL-MCNC: 74 MG/DL (ref 40–75)
HDLC SERPL: 38.5 % (ref 20–50)
HGB BLD-MCNC: 15.9 G/DL (ref 14–18)
IMM GRANULOCYTES # BLD AUTO: 0.02 K/UL (ref 0–0.04)
IMM GRANULOCYTES NFR BLD AUTO: 0.4 % (ref 0–0.5)
LDLC SERPL CALC-MCNC: 107 MG/DL (ref 63–159)
LYMPHOCYTES # BLD AUTO: 2 K/UL (ref 1–4.8)
LYMPHOCYTES NFR BLD: 34.9 % (ref 18–48)
MCH RBC QN AUTO: 30.4 PG (ref 27–31)
MCHC RBC AUTO-ENTMCNC: 33.5 G/DL (ref 32–36)
MCV RBC AUTO: 91 FL (ref 82–98)
MONOCYTES # BLD AUTO: 0.6 K/UL (ref 0.3–1)
MONOCYTES NFR BLD: 9.9 % (ref 4–15)
NEUTROPHILS # BLD AUTO: 3 K/UL (ref 1.8–7.7)
NEUTROPHILS NFR BLD: 53.4 % (ref 38–73)
NONHDLC SERPL-MCNC: 118 MG/DL
NRBC BLD-RTO: 0 /100 WBC
PLATELET # BLD AUTO: 254 K/UL (ref 150–450)
PMV BLD AUTO: 10.3 FL (ref 9.2–12.9)
POTASSIUM SERPL-SCNC: 4.6 MMOL/L (ref 3.5–5.1)
PROT SERPL-MCNC: 6.8 G/DL (ref 6–8.4)
RBC # BLD AUTO: 5.23 M/UL (ref 4.6–6.2)
SODIUM SERPL-SCNC: 141 MMOL/L (ref 136–145)
TRIGL SERPL-MCNC: 55 MG/DL (ref 30–150)
TSH SERPL DL<=0.005 MIU/L-ACNC: 0.64 UIU/ML (ref 0.4–4)
WBC # BLD AUTO: 5.67 K/UL (ref 3.9–12.7)

## 2023-01-10 PROCEDURE — 85025 COMPLETE CBC W/AUTO DIFF WBC: CPT | Performed by: FAMILY MEDICINE

## 2023-01-10 PROCEDURE — 84443 ASSAY THYROID STIM HORMONE: CPT | Performed by: FAMILY MEDICINE

## 2023-01-10 PROCEDURE — 80061 LIPID PANEL: CPT | Performed by: FAMILY MEDICINE

## 2023-01-10 PROCEDURE — 80053 COMPREHEN METABOLIC PANEL: CPT | Performed by: FAMILY MEDICINE

## 2023-01-10 PROCEDURE — 83036 HEMOGLOBIN GLYCOSYLATED A1C: CPT | Performed by: FAMILY MEDICINE

## 2023-01-10 PROCEDURE — 84153 ASSAY OF PSA TOTAL: CPT | Performed by: FAMILY MEDICINE

## 2023-01-10 PROCEDURE — 83701 LIPOPROTEIN BLD HR FRACTION: CPT | Performed by: FAMILY MEDICINE

## 2023-01-11 ENCOUNTER — TELEPHONE (OUTPATIENT)
Dept: GASTROENTEROLOGY | Facility: CLINIC | Age: 63
End: 2023-01-11
Payer: COMMERCIAL

## 2023-01-11 NOTE — TELEPHONE ENCOUNTER
Contact patient to inform that Dr. Collins office has received his referral. However  does not treat Ulcerative colitis and the referral was sent to the IBD department and they will contact him to schedule.     NO answer left message for patient to call the office back.

## 2023-01-11 NOTE — TELEPHONE ENCOUNTER
----- Message from Keena Merrill RN sent at 1/11/2023  8:59 AM CST -----  I'll add the patient to our list.    Thanks!  Keena  ----- Message -----  From: Chris Prater MA  Sent: 1/9/2023   4:33 PM CST  To: , #    Hi,     Patient has a referral  order placed for dx of ulcerative colitis. May you contact patient to schedule.     Thanks,   Chris Prater MA  ----- Message -----  From: Jose M Garcia  Sent: 1/9/2023   1:50 PM CST  To: Dennis HARDY Staff    Pt has a referral in system need to be scheduled     Please Call    Contact  [phone]

## 2023-01-13 LAB — LDLC SERPL-MCNC: 105 MG/DL

## 2023-01-25 ENCOUNTER — TELEPHONE (OUTPATIENT)
Dept: GASTROENTEROLOGY | Facility: CLINIC | Age: 63
End: 2023-01-25
Payer: COMMERCIAL

## 2023-01-25 NOTE — TELEPHONE ENCOUNTER
-Spoke to patient  -Had most his scopes done at  when seeing Dr. Greenberg  -Had Scope here last year with Dr. Collins  -no current IBD treatment  -no current symtpoms

## 2023-02-13 ENCOUNTER — TELEPHONE (OUTPATIENT)
Dept: GASTROENTEROLOGY | Facility: CLINIC | Age: 63
End: 2023-02-13
Payer: COMMERCIAL

## 2023-03-10 ENCOUNTER — PATIENT MESSAGE (OUTPATIENT)
Dept: OPTOMETRY | Facility: CLINIC | Age: 63
End: 2023-03-10
Payer: COMMERCIAL

## 2023-03-23 ENCOUNTER — OFFICE VISIT (OUTPATIENT)
Dept: PULMONOLOGY | Facility: CLINIC | Age: 63
End: 2023-03-23
Payer: COMMERCIAL

## 2023-03-23 VITALS
SYSTOLIC BLOOD PRESSURE: 152 MMHG | HEIGHT: 64 IN | DIASTOLIC BLOOD PRESSURE: 74 MMHG | RESPIRATION RATE: 18 BRPM | OXYGEN SATURATION: 99 % | HEART RATE: 58 BPM | BODY MASS INDEX: 28.17 KG/M2 | WEIGHT: 165 LBS

## 2023-03-23 DIAGNOSIS — K21.9 GASTROESOPHAGEAL REFLUX DISEASE, UNSPECIFIED WHETHER ESOPHAGITIS PRESENT: ICD-10-CM

## 2023-03-23 DIAGNOSIS — J44.9 CHRONIC OBSTRUCTIVE PULMONARY DISEASE, UNSPECIFIED COPD TYPE: ICD-10-CM

## 2023-03-23 PROCEDURE — 3077F PR MOST RECENT SYSTOLIC BLOOD PRESSURE >= 140 MM HG: ICD-10-PCS | Mod: CPTII,S$GLB,, | Performed by: INTERNAL MEDICINE

## 2023-03-23 PROCEDURE — 99999 PR PBB SHADOW E&M-EST. PATIENT-LVL III: CPT | Mod: PBBFAC,,, | Performed by: INTERNAL MEDICINE

## 2023-03-23 PROCEDURE — 99204 PR OFFICE/OUTPT VISIT, NEW, LEVL IV, 45-59 MIN: ICD-10-PCS | Mod: S$GLB,,, | Performed by: INTERNAL MEDICINE

## 2023-03-23 PROCEDURE — 3044F HG A1C LEVEL LT 7.0%: CPT | Mod: CPTII,S$GLB,, | Performed by: INTERNAL MEDICINE

## 2023-03-23 PROCEDURE — 3008F PR BODY MASS INDEX (BMI) DOCUMENTED: ICD-10-PCS | Mod: CPTII,S$GLB,, | Performed by: INTERNAL MEDICINE

## 2023-03-23 PROCEDURE — 3008F BODY MASS INDEX DOCD: CPT | Mod: CPTII,S$GLB,, | Performed by: INTERNAL MEDICINE

## 2023-03-23 PROCEDURE — 3044F PR MOST RECENT HEMOGLOBIN A1C LEVEL <7.0%: ICD-10-PCS | Mod: CPTII,S$GLB,, | Performed by: INTERNAL MEDICINE

## 2023-03-23 PROCEDURE — 3077F SYST BP >= 140 MM HG: CPT | Mod: CPTII,S$GLB,, | Performed by: INTERNAL MEDICINE

## 2023-03-23 PROCEDURE — 3078F DIAST BP <80 MM HG: CPT | Mod: CPTII,S$GLB,, | Performed by: INTERNAL MEDICINE

## 2023-03-23 PROCEDURE — 99204 OFFICE O/P NEW MOD 45 MIN: CPT | Mod: S$GLB,,, | Performed by: INTERNAL MEDICINE

## 2023-03-23 PROCEDURE — 99999 PR PBB SHADOW E&M-EST. PATIENT-LVL III: ICD-10-PCS | Mod: PBBFAC,,, | Performed by: INTERNAL MEDICINE

## 2023-03-23 PROCEDURE — 3078F PR MOST RECENT DIASTOLIC BLOOD PRESSURE < 80 MM HG: ICD-10-PCS | Mod: CPTII,S$GLB,, | Performed by: INTERNAL MEDICINE

## 2023-03-23 NOTE — PROGRESS NOTES
Subjective:       Patient ID: Azam Bowden Jr. is a 62 y.o. male.    Chief Complaint: No chief complaint on file.    62 year old male with COPD who presents to establish pulmonary care. His PCP sent for COPD evaluation.   Not on any inhalers. He feels dont work.   Patient states he did a screening CT in 2021 but has not since.   He quit smoking about 10-12 years ago.   Denies any fever or chills.   Some Dyspnea and increased sputum production.     Review of Systems   Respiratory:  Positive for sputum production, shortness of breath and dyspnea on extertion. Negative for cough and use of rescue inhaler.      Objective:      Physical Exam   Constitutional: He is oriented to person, place, and time. He appears well-developed and well-nourished. No distress.   HENT:   Head: Normocephalic.   Nose: Nose normal.   Cardiovascular: Normal rate and regular rhythm.   Pulmonary/Chest: Normal expansion and symmetric chest wall expansion. He has no rhonchi. He has no rales.   Musculoskeletal:         General: No edema. Normal range of motion.   Neurological: He is alert and oriented to person, place, and time.   Skin: Skin is warm and dry. He is not diaphoretic.   Nursing note and vitals reviewed.  Personal Diagnostic Review  none pertinent  No flowsheet data found.      Assessment:       1. Chronic obstructive pulmonary disease, unspecified COPD type          Outpatient Encounter Medications as of 3/23/2023   Medication Sig Dispense Refill    albuterol (PROAIR HFA) 90 mcg/actuation inhaler Inhale 2 puffs into the lungs every 6 (six) hours as needed for Wheezing or Shortness of Breath. Rescue 54 g 3    atorvastatin (LIPITOR) 40 MG tablet Take 1 tablet (40 mg total) by mouth every evening. 90 tablet 3    diazePAM (VALIUM) 5 MG tablet Take 1 tablet (5 mg total) by mouth daily as needed for Anxiety (claustraphobia). 30 tablet 1    EScitalopram oxalate (LEXAPRO) 10 MG tablet Take 1 tablet (10 mg total) by mouth once daily. 90 tablet  3    esomeprazole (NEXIUM) 20 MG capsule Take 1 capsule (20 mg total) by mouth before breakfast. 90 capsule 3    latanoprost 0.005 % ophthalmic solution Place 1 drop into both eyes once daily. 2.5 mL 12    mesalamine (CANASA) 1000 MG Supp Place 1 suppository (1,000 mg total) rectally nightly. (Patient not taking: No sig reported) 30 suppository 5    tiotropium bromide (SPIRIVA RESPIMAT) 2.5 mcg/actuation inhaler Inhale 2 puffs into the lungs Daily. Controller 4 g 6    [DISCONTINUED] albuterol (PROAIR HFA) 90 mcg/actuation inhaler Inhale 2 puffs into the lungs every 6 (six) hours as needed for Wheezing or Shortness of Breath. Rescue 54 g 3    [DISCONTINUED] EScitalopram oxalate (LEXAPRO) 10 MG tablet Take 1 tablet (10 mg total) by mouth once daily. 90 tablet 3    [DISCONTINUED] esomeprazole (NEXIUM) 20 MG capsule Take 1 capsule (20 mg total) by mouth before breakfast. 90 capsule 3    [DISCONTINUED] tiotropium bromide (SPIRIVA RESPIMAT) 2.5 mcg/actuation inhaler Inhale 2 puffs into the lungs Daily. Controller 4 g 6     No facility-administered encounter medications on file as of 3/23/2023.     Orders Placed This Encounter   Procedures    CT Chest Lung Screening Low Dose     Standing Status:   Future     Standing Expiration Date:   3/23/2024     Order Specific Question:   Is there documentation of shared decision making for this lung screening exam?     Answer:   Yes     Order Specific Question:   Is the patient a current smoker?     Answer:   No     Order Specific Question:   How many years has the patient quit smoking?     Answer:   14     Order Specific Question:   Does the patient have a 20-pack/year or greater smoke history?     Answer:   Yes     Order Specific Question:   Is the patient between the age 50-80 years old?     Answer:   Yes     Order Specific Question:   Does the patient show any signs or symptoms of lung cancer?     Answer:   No     Order Specific Question:   Is this the first (baseline) CT or an  annual exam?     Answer:   Annual [2]     Order Specific Question:   May the Radiologist modify the order per protocol to meet the clinical needs of the patient?     Answer:   Yes     Order Specific Question:   Is this a low dose screening chest CT?     Answer:   Yes    Complete PFT with bronchodilator     Standing Status:   Future     Standing Expiration Date:   3/23/2024     Order Specific Question:   Release to patient     Answer:   Immediate       Plan:     GERD (gastroesophageal reflux disease)  PPI    COPD (chronic obstructive pulmonary disease)  Patient with known COPD but not on inhalers. Will order PFTs. Last ordered in 2021 with FEV1 2.16(48% of predicted) post bronchodilator. Moderately severe COPD.     CT lung screening ordered.   Yearly follow up. Can see JOSELYN.       Jerman Monique MD

## 2023-03-24 ENCOUNTER — PATIENT MESSAGE (OUTPATIENT)
Dept: PULMONOLOGY | Facility: CLINIC | Age: 63
End: 2023-03-24
Payer: COMMERCIAL

## 2023-03-27 ENCOUNTER — TELEPHONE (OUTPATIENT)
Dept: PULMONOLOGY | Facility: CLINIC | Age: 63
End: 2023-03-27
Payer: COMMERCIAL

## 2023-03-27 RX ORDER — UMECLIDINIUM 62.5 UG/1
62.5 AEROSOL, POWDER ORAL DAILY
Qty: 1 EACH | Refills: 5 | Status: SHIPPED | OUTPATIENT
Start: 2023-03-27 | End: 2023-07-11

## 2023-03-27 NOTE — ASSESSMENT & PLAN NOTE
Patient with known COPD but not on inhalers. Will order PFTs. Last ordered in 2021 with FEV1 2.16(48% of predicted) post bronchodilator. Moderately severe COPD.     CT lung screening ordered.

## 2023-03-27 NOTE — TELEPHONE ENCOUNTER
----- Message from Beverly Pereira sent at 3/27/2023 11:01 AM CDT -----  Type:  Needs Medical Advice    Who Called:  pt  Symptoms (please be specific):    How long has patient had these symptoms:    Pharmacy name and phone #:    Would the patient rather a call back or a response via [facility]?   Best Call Back Number: [phone] (M)   Additional Information: wants to speak to the office because the below medicine is over $500.00 and he is due for some test    tiotropium bromide (SPIRIVA RESPIMAT) 2.5 mcg/actuation inhaler 4 g 6 3/23/2023  No  Sig - Route: Inhale 2 puffs into the lungs Daily. Controller - Inhalation  Sent to pharmacy as: tiotropium bromide (SPIRIVA RESPIMAT) 2.5 mcg/actuation inhaler  Class: Normal  Order: [id number]  Date/Time Signed: 3/23/2023 14:41      E-Prescribing Status: Receipt confirmed by pharmacy (3/23/2023  2:41 PM CDT)    [facility] - [address] [place] [address]

## 2023-04-24 ENCOUNTER — PATIENT MESSAGE (OUTPATIENT)
Dept: SPORTS MEDICINE | Facility: CLINIC | Age: 63
End: 2023-04-24
Payer: COMMERCIAL

## 2023-05-05 DIAGNOSIS — M79.641 BILATERAL HAND PAIN: Primary | ICD-10-CM

## 2023-05-05 DIAGNOSIS — M79.642 BILATERAL HAND PAIN: Primary | ICD-10-CM

## 2023-05-08 ENCOUNTER — HOSPITAL ENCOUNTER (OUTPATIENT)
Dept: RADIOLOGY | Facility: HOSPITAL | Age: 63
Discharge: HOME | End: 2023-05-08
Attending: ORTHOPAEDIC SURGERY
Payer: COMMERCIAL

## 2023-05-08 ENCOUNTER — OFFICE VISIT (OUTPATIENT)
Dept: ORTHOPEDICS | Facility: CLINIC | Age: 63
End: 2023-05-08
Payer: COMMERCIAL

## 2023-05-08 DIAGNOSIS — M79.642 BILATERAL HAND PAIN: ICD-10-CM

## 2023-05-08 DIAGNOSIS — M65.322 TRIGGER INDEX FINGER OF LEFT HAND: ICD-10-CM

## 2023-05-08 DIAGNOSIS — M65.321 TRIGGER INDEX FINGER OF RIGHT HAND: ICD-10-CM

## 2023-05-08 DIAGNOSIS — M18.0 PRIMARY OSTEOARTHRITIS OF BOTH FIRST CARPOMETACARPAL JOINTS: ICD-10-CM

## 2023-05-08 DIAGNOSIS — G56.03 CARPAL TUNNEL SYNDROME, BILATERAL: Primary | ICD-10-CM

## 2023-05-08 DIAGNOSIS — M79.641 BILATERAL HAND PAIN: ICD-10-CM

## 2023-05-08 PROCEDURE — 99204 OFFICE O/P NEW MOD 45 MIN: CPT | Mod: 25,S$GLB,, | Performed by: ORTHOPAEDIC SURGERY

## 2023-05-08 PROCEDURE — 1159F PR MEDICATION LIST DOCUMENTED IN MEDICAL RECORD: ICD-10-PCS | Mod: CPTII,S$GLB,, | Performed by: ORTHOPAEDIC SURGERY

## 2023-05-08 PROCEDURE — 99999 PR PBB SHADOW E&M-EST. PATIENT-LVL III: CPT | Mod: PBBFAC,,, | Performed by: ORTHOPAEDIC SURGERY

## 2023-05-08 PROCEDURE — 20550 TENDON SHEATH: ICD-10-PCS | Mod: 50,S$GLB,, | Performed by: ORTHOPAEDIC SURGERY

## 2023-05-08 PROCEDURE — 3044F HG A1C LEVEL LT 7.0%: CPT | Mod: CPTII,S$GLB,, | Performed by: ORTHOPAEDIC SURGERY

## 2023-05-08 PROCEDURE — 1159F MED LIST DOCD IN RCRD: CPT | Mod: CPTII,S$GLB,, | Performed by: ORTHOPAEDIC SURGERY

## 2023-05-08 PROCEDURE — 99999 PR PBB SHADOW E&M-EST. PATIENT-LVL III: ICD-10-PCS | Mod: PBBFAC,,, | Performed by: ORTHOPAEDIC SURGERY

## 2023-05-08 PROCEDURE — 20600 DRAIN/INJ JOINT/BURSA W/O US: CPT | Mod: 50,51,XS,S$GLB | Performed by: ORTHOPAEDIC SURGERY

## 2023-05-08 PROCEDURE — 99204 PR OFFICE/OUTPT VISIT, NEW, LEVL IV, 45-59 MIN: ICD-10-PCS | Mod: 25,S$GLB,, | Performed by: ORTHOPAEDIC SURGERY

## 2023-05-08 PROCEDURE — 73130 X-RAY EXAM OF HAND: CPT | Mod: 26,,, | Performed by: RADIOLOGY

## 2023-05-08 PROCEDURE — 3044F PR MOST RECENT HEMOGLOBIN A1C LEVEL <7.0%: ICD-10-PCS | Mod: CPTII,S$GLB,, | Performed by: ORTHOPAEDIC SURGERY

## 2023-05-08 PROCEDURE — 20550 NJX 1 TENDON SHEATH/LIGAMENT: CPT | Mod: 50,S$GLB,, | Performed by: ORTHOPAEDIC SURGERY

## 2023-05-08 PROCEDURE — 20600 SMALL JOINT ASPIRATION/INJECTION: L THUMB CMC: ICD-10-PCS | Mod: 50,51,XS,S$GLB | Performed by: ORTHOPAEDIC SURGERY

## 2023-05-08 PROCEDURE — 1160F RVW MEDS BY RX/DR IN RCRD: CPT | Mod: CPTII,S$GLB,, | Performed by: ORTHOPAEDIC SURGERY

## 2023-05-08 PROCEDURE — 73130 XR HAND COMPLETE 3 VIEWS BILATERAL: ICD-10-PCS | Mod: 26,,, | Performed by: RADIOLOGY

## 2023-05-08 PROCEDURE — 73130 X-RAY EXAM OF HAND: CPT | Mod: TC,50,PO

## 2023-05-08 PROCEDURE — 1160F PR REVIEW ALL MEDS BY PRESCRIBER/CLIN PHARMACIST DOCUMENTED: ICD-10-PCS | Mod: CPTII,S$GLB,, | Performed by: ORTHOPAEDIC SURGERY

## 2023-05-08 RX ADMIN — METHYLPREDNISOLONE ACETATE 40 MG: 40 INJECTION, SUSPENSION INTRA-ARTICULAR; INTRALESIONAL; INTRAMUSCULAR; SOFT TISSUE at 10:05

## 2023-05-08 NOTE — PROGRESS NOTES
[facility]  History & Physical  Orthopedics    SUBJECTIVE:      COVID-19 attestation:  This patient was treated during the COVID-19 pandemic.  This was discussed with the patient, they are aware of our current policies and procedures, were given the option of delaying their visit and or switching to a virtual visit, delaying their surgery when applicable, and they elect to proceed.    Chief Complaint:   Chief Complaint   Patient presents with    Right Hand - Pain    Left Hand - Pain       Referring Provider: None     History of Present Illness:  Patient is a 62 y.o. right hand dominant male who presents today with complaints of Bilateral basilar thumb pain + bilateral hand numbness R=L for 20 years. He has been previously treated by Dr. Daniel 5 years ago with cmc injections that lasted up until now. He like to work on old cars so he is constantly working with his hands. He reports no history of bracing use.      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 Also rides motorcycles.    The patient denies any fevers, chills, N/V, D/C and presents for evaluation.       Past Medical History:   Diagnosis Date    Anxiety     GERD (gastroesophageal reflux disease)     Glaucoma     History of alcohol abuse     Ulcerative colitis     Ulcerative colitis      Past Surgical History:   Procedure Laterality Date    anal fistula      CATARACT EXTRACTION W/  INTRAOCULAR LENS IMPLANT Bilateral n/a    done outside [facility]    CATARACT EXTRACTION, BILATERAL      COLONOSCOPY  2016    ulcerative colitis    COLONOSCOPY N/A 1/28/2022    Procedure: COLONOSCOPY;  Surgeon: Philip Collins MD;  Location: [facility] ([address] [place] [address]);  Service: Endoscopy;  Laterality: N/A;  First provider available for hematochezia and red blood mixed in stools heme-positive stools  rapid 630am-inst handed-tb    TONSILLECTOMY      VITRECTOMY BY PARS PLANA APPROACH Left 11/28/2018    Procedure: VITRECTOMY, PARS PLANA APPROACH;   Surgeon: DIANDRA Rushing MD;  Location: [facility] OR [address] [place] [address];  Service: Ophthalmology;  Laterality: Left;  20 min    VITRECTOMY BY PARS PLANA APPROACH Right 12/12/2018    Procedure: VITRECTOMY, PARS PLANA APPROACH;  Surgeon: DIANDRA Rushing MD;  Location: [facility] OR [address] [place] [address];  Service: Ophthalmology;  Laterality: Right;  20min     Review of patient's allergies indicates:  No Known Allergies  Social History     Social History Narrative    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    He is     Has a 16-year-old son who was kicked out of high school today January 27, 2022     Family History   Problem Relation Age of Onset    Cataracts Mother     Glaucoma Mother     Macular degeneration Mother     Diabetes Father     Cancer Father 75        Colon    Cataracts Father     Glaucoma Father     Colon cancer Father 70    Cancer Sister     Cancer Brother         prostate cancer    Crohn's disease Paternal Uncle     Inflammatory bowel disease Paternal Uncle     Colon cancer Paternal Grandfather 60    Amblyopia Neg Hx     Blindness Neg Hx     Hypertension Neg Hx     Retinal detachment Neg Hx     Strabismus Neg Hx     Celiac disease Neg Hx     Cirrhosis Neg Hx     Colon polyps Neg Hx     Esophageal cancer Neg Hx     Liver cancer Neg Hx     Liver disease Neg Hx     Rectal cancer Neg Hx     Stomach cancer Neg Hx     Ulcerative colitis Neg Hx     Pancreatitis Neg Hx     Pancreatic cancer Neg Hx     Kidney cancer Neg Hx     Bladder Cancer Neg Hx     Uterine cancer Neg Hx     Ovarian cancer Neg Hx          Current Outpatient Medications:     albuterol (PROAIR HFA) 90 mcg/actuation inhaler, Inhale 2 puffs into the lungs every 6 (six) hours as needed for Wheezing or Shortness of Breath. Rescue, Disp: 54 g, Rfl: 3    atorvastatin (LIPITOR) 40 MG tablet, Take 1 tablet (40 mg total) by mouth every evening., Disp: 90 tablet, Rfl: 3    diazePAM (VALIUM) 5 MG tablet, Take 1 tablet (5 mg total) by mouth daily as needed for Anxiety  (claustraphobia)., Disp: 30 tablet, Rfl: 1    EScitalopram oxalate (LEXAPRO) 10 MG tablet, Take 1 tablet (10 mg total) by mouth once daily., Disp: 90 tablet, Rfl: 3    esomeprazole (NEXIUM) 20 MG capsule, Take 1 capsule (20 mg total) by mouth before breakfast., Disp: 90 capsule, Rfl: 3    latanoprost 0.005 % ophthalmic solution, Place 1 drop into both eyes once daily., Disp: 2.5 mL, Rfl: 12    tiotropium bromide (SPIRIVA RESPIMAT) 2.5 mcg/actuation inhaler, Inhale 2 puffs into the lungs Daily. Controller, Disp: 4 g, Rfl: 6    umeclidinium (INCRUSE ELLIPTA) 62.5 mcg/actuation inhalation capsule, Inhale 62.5 mcg into the lungs once daily. Controller, Disp: 1 each, Rfl: 5    mesalamine (CANASA) 1000 MG Supp, Place 1 suppository (1,000 mg total) rectally nightly. (Patient not taking: No sig reported), Disp: 30 suppository, Rfl: 5    ROS    Review of Systems:  Constitutional: no fever or chills  Eyes: no visual changes  ENT: no nasal congestion or sore throat  Respiratory: no cough or shortness of breath  Cardiovascular: no chest pain  Gastrointestinal: no nausea or vomiting, tolerating diet  Musculoskeletal: pain, soreness, and numbness/tingling    OBJECTIVE:      Vital Signs (Most Recent):  There were no vitals filed for this visit.  There is no height or weight on file to calculate BMI.    Physical Exam    Physical Exam:  Constitutional: The patient appears well-developed and well-nourished. No distress.   Head: Normocephalic and atraumatic.   Nose: Nose normal.   Eyes: Conjunctivae and EOM are normal.   Neck: No tracheal deviation present.   Cardiovascular: Normal rate and intact distal pulses.    Pulmonary/Chest: Effort normal. No respiratory distress.   Abdominal: There is no guarding.   Lymphatic: Negative for adenopathy   Neurological: The patient is alert.   Psychiatric: The patient has a normal mood and affect.     Cervical Exam:  ROM full, supple  Negative Spurling's  Positive William's    Bilateral Hand/Wrist  Examination:    Observation/Inspection:  Swelling  Index flexor sheaths    Deformity  none  Discoloration  none     Scars   none    Atrophy  none    HAND/WRIST EXAMINATION:  Finkelstein's Test   Neg  WHAT Test    Neg  Snuff box tenderness   Neg  Lugo's Test    Neg  Hook of Hamate Tenderness  Neg  CMC grind    Bilateral positive  Circumduction test   Neg  TFCC Compression Test  Neg    Tender to palpation bilateral index flexor sheaths, demonstrable catching and locking bilateral index fingers, decreased bilateral thumb CMC motion otherwise bilateral ROM hand/wrist/elbow full    Neurovascular Exam:  Digits WWP, brisk CR < 3s throughout  NVI motor/LTS to M/R/U nerves, radial pulse 2+  2+ biceps and brachioradialis reflexes  Tinel's Test - Carpal Tunnel  positive bilaterally  Tinel's Test - Cubital Tunnel  Neg  Phalen's Test    positive bilaterally  Median Nerve Compression Test positive bilaterally    Diagnostic Results:     X-rays AP, lateral and oblique bilateral hands taken today are independently reviewed by me and show bilateral Eaton stage II basilar thumb arthritis.     ASSESSMENT/PLAN:      62 y.o. yo male with   Encounter Diagnoses   Name Primary?    Carpal tunnel syndrome, bilateral Yes    Primary osteoarthritis of both first carpometacarpal joints     Trigger index finger of right hand     Trigger index finger of left hand       Plan:  We have discussed the natural history of basilar thumb arthritis, carpal tunnel and trigger fingers including treatment options such as splinting, oral and topical anti-inflammatories, cortisone injections and surgery. I have given bilateral thumb 3D braces for comfort.    -I have offered him a selective injection. I have explained the risks, benefits, and alternatives of the procedure in detail.  The patient voices understanding and all questions have been answered. The patient agrees to proceed as planned. So after a sterile prep of the skin in the normal fashion the  bilateral index flexor sheath and bilateral basilar thumb joints were injected using a 25 gauge needle with a combination of 1cc 1% plain lidocaine and 40 mg of methylprednisolone.  The patient is cautioned and immediate relief of pain is secondary to the local anesthetic and will be temporary.  After the anesthetic wears off there may be a increase in pain that may last for a few hours or a few days and they should use ice to help alleviate this flair up of pain. Patient tolerated the procedure well.    - F/U after EMG/NCS  - Call with any questions/concerns in the interim     The patient's pathophysiology was explained in detail with reference to x-rays, models, other visual aids as appropriate.  Treatment options were discussed in detail.  Questions were invited and answered to the patient's satisfaction. I reviewed Primary care , and other specialty's notes to better coordinate patient's care.          Mary Johnson MD    Please be aware that this note has been generated with the assistance of MModal voice-to-text.  Please excuse any spelling or grammatical errors.

## 2023-05-22 RX ORDER — METHYLPREDNISOLONE ACETATE 40 MG/ML
40 INJECTION, SUSPENSION INTRA-ARTICULAR; INTRALESIONAL; INTRAMUSCULAR; SOFT TISSUE
Status: DISCONTINUED | OUTPATIENT
Start: 2023-05-08 | End: 2023-05-22 | Stop reason: HOSPADM

## 2023-05-23 NOTE — PROCEDURES
Tendon Sheath    Date/Time: 5/8/2023 10:30 AM  Performed by: Mary Johnson MD  Authorized by: Mary Johnson MD     Consent Done?:  Yes (Verbal)  Indications:  Pain  Timeout: prior to procedure the correct patient, procedure, and site was verified    Prep: patient was prepped and draped in usual sterile fashion      Local anesthesia used?: Yes    Anesthesia:  Local infiltration  Local anesthetic:  Lidocaine 1% without epinephrine  Anesthetic total (ml):  1    Location:  Index finger  Site:  R index flexor tendon sheath  Ultrasonic guidance for needle placement?: No    Needle size:  25 G  Approach:  Volar  Medications:  40 mg methylPREDNISolone acetate 40 mg/mL  Patient tolerance:  Patient tolerated the procedure well with no immediate complications  Tendon Sheath    Date/Time: 5/8/2023 10:30 AM  Performed by: Mary Johnson MD  Authorized by: Mary Johnson MD     Consent Done?:  Yes (Verbal)  Indications:  Pain  Timeout: prior to procedure the correct patient, procedure, and site was verified    Prep: patient was prepped and draped in usual sterile fashion      Local anesthesia used?: Yes    Anesthesia:  Local infiltration  Local anesthetic:  Lidocaine 1% without epinephrine  Anesthetic total (ml):  1    Location:  Index finger  Site:  L index flexor tendon sheath  Ultrasonic guidance for needle placement?: No    Needle size:  25 G  Approach:  Volar  Medications:  40 mg methylPREDNISolone acetate 40 mg/mL  Patient tolerance:  Patient tolerated the procedure well with no immediate complications  Small Joint Aspiration/Injection: R thumb CMC    Date/Time: 5/8/2023 10:30 AM  Performed by: Mary Johnson MD  Authorized by: Mary Johnson MD     Consent Done?:  Yes (Verbal)  Indications:  Pain  Timeout: prior to procedure the correct patient, procedure, and site was verified    Prep: patient was prepped and draped in usual sterile fashion      Local anesthesia used?: Yes    Anesthesia:  Local infiltration  Local  anesthetic:  Lidocaine 1% without epinephrine  Location:  Thumb  Site:  R thumb CMC  Needle size:  25 G  Medications:  40 mg methylPREDNISolone acetate 40 mg/mL  Patient tolerance:  Patient tolerated the procedure well with no immediate complications  Small Joint Aspiration/Injection: L thumb CMC    Date/Time: 5/8/2023 10:30 AM  Performed by: Mary Johnson MD  Authorized by: Mary Johnson MD     Consent Done?:  Yes (Verbal)  Indications:  Pain  Timeout: prior to procedure the correct patient, procedure, and site was verified    Prep: patient was prepped and draped in usual sterile fashion      Local anesthesia used?: Yes    Anesthesia:  Local infiltration  Local anesthetic:  Lidocaine 1% without epinephrine  Location:  Thumb  Site:  L thumb CMC  Needle size:  25 G  Medications:  40 mg methylPREDNISolone acetate 40 mg/mL  Patient tolerance:  Patient tolerated the procedure well with no immediate complications

## 2023-06-20 ENCOUNTER — PROCEDURE VISIT (OUTPATIENT)
Dept: NEUROLOGY | Facility: CLINIC | Age: 63
End: 2023-06-20
Payer: COMMERCIAL

## 2023-06-20 ENCOUNTER — PATIENT MESSAGE (OUTPATIENT)
Dept: ORTHOPEDICS | Facility: CLINIC | Age: 63
End: 2023-06-20
Payer: COMMERCIAL

## 2023-06-20 DIAGNOSIS — G56.03 CARPAL TUNNEL SYNDROME, BILATERAL: ICD-10-CM

## 2023-06-20 PROCEDURE — 95886 MUSC TEST DONE W/N TEST COMP: CPT | Mod: S$GLB,,, | Performed by: PHYSICAL MEDICINE & REHABILITATION

## 2023-06-20 PROCEDURE — 95885 PR MUSC TST DONE W/NERV TST LIM: ICD-10-PCS | Mod: 59,S$GLB,, | Performed by: PHYSICAL MEDICINE & REHABILITATION

## 2023-06-20 PROCEDURE — 95885 MUSC TST DONE W/NERV TST LIM: CPT | Mod: 59,S$GLB,, | Performed by: PHYSICAL MEDICINE & REHABILITATION

## 2023-06-20 PROCEDURE — 95911 PR NERVE CONDUCTION STUDY; 9-10 STUDIES: ICD-10-PCS | Mod: S$GLB,,, | Performed by: PHYSICAL MEDICINE & REHABILITATION

## 2023-06-20 PROCEDURE — 95886 PR EMG COMPLETE, W/ NERVE CONDUCTION STUDIES, 5+ MUSCLES: ICD-10-PCS | Mod: S$GLB,,, | Performed by: PHYSICAL MEDICINE & REHABILITATION

## 2023-06-20 PROCEDURE — 95911 NRV CNDJ TEST 9-10 STUDIES: CPT | Mod: S$GLB,,, | Performed by: PHYSICAL MEDICINE & REHABILITATION

## 2023-06-20 NOTE — PROCEDURES
Test Date:  2023    Patient: linda solomon : 1960 Physician: Rufino Wilkerson D.O.   ID#: [id number] SEX: Male Ref. Phys: Mary Johnson MD     HPI: Linda Solomon Jr. is a 62 y.o.male who presents for NCS/EMG to evaluate CTS bilaterally.      NCV & EMG Findings:  Evaluation of the left median sensory and the right median sensory nerves showed prolonged distal peak latency and decreased conduction velocity.  All remaining nerves (as indicated in the following tables) were within normal limits.  All examined muscles (as indicated in the following table) showed no evidence of electrical instability.    Impression:  There is electrophysiologic evidence of a bilateral sensory median mononeuropathy across the wrist (I.e. Carpal tunnel syndrome).  There is no motor axonal loss.  There is no active denervation.  This is graded as Mild in severity bilaterally.        ___________________________  Rufino Wilkerson D.O.        NCS+  Motor Nerve Results      Latency Amplitude F-Lat Segment Distance CV Comment   Site (ms) Norm (mV) Norm (ms)  (cm) (m/s) Norm    Left Median (APB)   Wrist 4.2  < 4.7 5.7  > 3.8  Wrist-Palm - - -    Elbow 8.9 - 4.0 -  Elbow-Wrist 24.5 52  > 47    Right Median (APB)   Wrist 4.1  < 4.7 5.3  > 3.8  Wrist-Palm - - -    Elbow 8.9 - 5.8 -  Elbow-Wrist 28 58  > 47    Left Ulnar (ADM)   Wrist 2.9  < 3.7 8.2  > 3.0         Bel Elbow 7.4 - 7.8 -  Bel Elbow-Wrist 27 60  > 52    Abv Elbow 8.7 - 8.7 -  Abv Elbow-Bel Elbow 9 69  > 43    Right Ulnar (ADM)   Wrist 3.2  < 3.7 8.0  > 3.0         Bel Elbow 7.0 - 8.3 -  Bel Elbow-Wrist 24 63  > 52    Abv Elbow 8.5 - 8.3 -  Abv Elbow-Bel Elbow 9 60  > 43      Sensory Nerve Results      Latency (Peak) Amplitude (P-P) Segment Distance CV Comment   Site (ms) Norm (µV) Norm  (cm) (m/s) Norm    Left Median   Wrist-Dig II *4.3  < 4.0 25  > 8 Wrist-Dig II 14 *33  > 39    Right Median   Wrist-Dig II *4.1  < 4.0 18  > 8 Wrist-Dig II 14 *34  > 39    Left Ulnar    Wrist-Dig V 3.3  < 4.0 23  > 4 Wrist-Dig V 14 42  > 38    Right Ulnar   Wrist-Dig V 3.2  < 4.0 33  > 4 Wrist-Dig V 14 44  > 38    Left Radial   Forearm-Wrist 2.2  < 2.8 21  > 11 Forearm-Wrist 10 45 -    Right Radial   Forearm-Wrist 2.3  < 2.8 22  > 11 Forearm-Wrist 10 43 -      EMG+     Side Muscle Nerve Root Ins Act Fibs Psw Amp Dur Poly Recrt Int Pat Comment   Left Deltoid Axillary C5-C6 Nml Nml Nml Nml Nml 0 Nml Nml    Left Biceps Musculocut C5-C6 Nml Nml Nml Nml Nml 0 Nml Nml    Left Triceps Radial C6-C8 Nml Nml Nml Nml Nml 0 Nml Nml    Left Pronator Teres Median C6-C7 Nml Nml Nml Nml Nml 0 Nml Nml    Left APB Median C8-T1 Nml Nml Nml Nml Nml 0 Nml Nml    Right APB Median C8-T1 Nml Nml Nml Nml Nml 0 Nml Nml            Waveforms:    Motor              Sensory

## 2023-07-11 ENCOUNTER — TELEPHONE (OUTPATIENT)
Dept: INTERNAL MEDICINE | Facility: CLINIC | Age: 63
End: 2023-07-11

## 2023-07-11 ENCOUNTER — OFFICE VISIT (OUTPATIENT)
Dept: INTERNAL MEDICINE | Facility: CLINIC | Age: 63
End: 2023-07-11
Payer: MEDICARE

## 2023-07-11 VITALS
BODY MASS INDEX: 27.63 KG/M2 | OXYGEN SATURATION: 98 % | HEART RATE: 61 BPM | SYSTOLIC BLOOD PRESSURE: 160 MMHG | HEIGHT: 66 IN | DIASTOLIC BLOOD PRESSURE: 80 MMHG | WEIGHT: 171.94 LBS

## 2023-07-11 DIAGNOSIS — F32.A DEPRESSION, UNSPECIFIED DEPRESSION TYPE: ICD-10-CM

## 2023-07-11 DIAGNOSIS — Z78.9 HEALTH MAINTENANCE ALTERATION: ICD-10-CM

## 2023-07-11 DIAGNOSIS — F41.9 ANXIETY: Primary | ICD-10-CM

## 2023-07-11 DIAGNOSIS — R03.0 ELEVATED BP WITHOUT DIAGNOSIS OF HYPERTENSION: ICD-10-CM

## 2023-07-11 DIAGNOSIS — F40.240 CLAUSTROPHOBIA: ICD-10-CM

## 2023-07-11 DIAGNOSIS — E78.5 DYSLIPIDEMIA: ICD-10-CM

## 2023-07-11 DIAGNOSIS — K21.9 GASTROESOPHAGEAL REFLUX DISEASE, UNSPECIFIED WHETHER ESOPHAGITIS PRESENT: ICD-10-CM

## 2023-07-11 DIAGNOSIS — J44.9 CHRONIC OBSTRUCTIVE PULMONARY DISEASE, UNSPECIFIED COPD TYPE: ICD-10-CM

## 2023-07-11 PROCEDURE — 90677 PCV20 VACCINE IM: CPT | Mod: PBBFAC

## 2023-07-11 PROCEDURE — 99213 OFFICE O/P EST LOW 20 MIN: CPT | Mod: PBBFAC | Performed by: FAMILY MEDICINE

## 2023-07-11 RX ORDER — ATORVASTATIN CALCIUM 40 MG/1
40 TABLET, FILM COATED ORAL NIGHTLY
Qty: 90 TABLET | Refills: 3 | Status: SHIPPED | OUTPATIENT
Start: 2023-07-11 | End: 2023-11-13 | Stop reason: SDUPTHER

## 2023-07-11 RX ORDER — ALBUTEROL SULFATE 90 UG/1
2 AEROSOL, METERED RESPIRATORY (INHALATION) EVERY 6 HOURS PRN
Qty: 54 G | Refills: 3 | Status: SHIPPED | OUTPATIENT
Start: 2023-07-11 | End: 2023-11-13 | Stop reason: SDUPTHER

## 2023-07-11 RX ORDER — ESCITALOPRAM OXALATE 10 MG/1
10 TABLET ORAL DAILY
Qty: 90 TABLET | Refills: 3 | Status: SHIPPED | OUTPATIENT
Start: 2023-07-11 | End: 2023-11-13 | Stop reason: SDUPTHER

## 2023-07-11 RX ORDER — HYDROGEN PEROXIDE 3 %
20 SOLUTION, NON-ORAL MISCELLANEOUS
Qty: 90 CAPSULE | Refills: 3 | Status: SHIPPED | OUTPATIENT
Start: 2023-07-11 | End: 2023-08-28 | Stop reason: SDUPTHER

## 2023-07-11 RX ORDER — DIAZEPAM 5 MG/1
5 TABLET ORAL EVERY 6 HOURS PRN
Qty: 45 TABLET | Refills: 0 | Status: CANCELLED | OUTPATIENT
Start: 2023-07-11

## 2023-07-11 RX ORDER — DIAZEPAM 10 MG/1
10 TABLET ORAL DAILY PRN
Qty: 30 TABLET | Refills: 1 | Status: SHIPPED | OUTPATIENT
Start: 2023-07-11 | End: 2023-11-13 | Stop reason: SDUPTHER

## 2023-07-11 NOTE — PROGRESS NOTES
Primary Care Visit  Family Medicine at [facility]      DATE   2023 10:00 AM    REASON FOR VISIT   Concern for anxiety  Medication renewal     PRIMARY CARE PROVIDER   RIKKI VÁZQUEZ MD    HISTORY OF PRESENTING ILLNESS   Azam Bowden Jr., 63 y.o., presents today due to concern for anxierty.   Needs RF valium and lexipro and his other meds  Turned 63 yesterday - due to prevnar-20 & AAA screening  CT lungs ordered by pulm never scheduled - will see if that can get scheduled      Current Medications:  Current Outpatient Medications   Medication Sig    albuterol (PROAIR HFA) 90 mcg/actuation inhaler Inhale 2 puffs into the lungs every 6 (six) hours as needed for Wheezing or Shortness of Breath. Rescue    atorvastatin (LIPITOR) 40 MG tablet Take 1 tablet (40 mg total) by mouth every evening.    diazePAM (VALIUM) 5 MG tablet Take 1 tablet (5 mg total) by mouth daily as needed for Anxiety (claustraphobia).    EScitalopram oxalate (LEXAPRO) 10 MG tablet Take 1 tablet (10 mg total) by mouth once daily.    esomeprazole (NEXIUM) 20 MG capsule Take 1 capsule (20 mg total) by mouth before breakfast.    latanoprost 0.005 % ophthalmic solution Place 1 drop into both eyes once daily.    mesalamine (CANASA) 1000 MG Supp Place 1 suppository (1,000 mg total) rectally nightly. (Patient not taking: No sig reported)    tiotropium bromide (SPIRIVA RESPIMAT) 2.5 mcg/actuation inhaler Inhale 2 puffs into the lungs Daily. Controller    umeclidinium (INCRUSE ELLIPTA) 62.5 mcg/actuation inhalation capsule Inhale 62.5 mcg into the lungs once daily. Controller   Last reviewed on 2023 10:36 AM by Mary Johnson MD     Allergies:  Review of patient's allergies indicates:  No Known Allergies    Social History     Tobacco Use    Smoking status: Former     Packs/day: 1.00     Years: 20.00     Pack years: 20.00     Types: Cigarettes     Quit date: 2/10/2009     Years since quittin.4    Smokeless tobacco: Former   Substance Use  "Topics    Alcohol use: Not Currently     Alcohol/week: 8.0 standard drinks     Types: 8 Cans of beer per week     Comment: last drink 1/3/22    Drug use: Yes     Types: Marijuana     Comment: Cannabis occasionally         SUBJECTIVE   Review of Systems:  Review of Systems   Constitutional:  Negative for chills and fever.   HENT:  Negative for ear pain.    Eyes:  Negative for pain.   Respiratory:  Negative for chest tightness.    Cardiovascular:  Negative for chest pain.   Gastrointestinal:  Negative for abdominal pain.   Genitourinary:  Negative for flank pain.   Musculoskeletal:  Negative for gait problem.   Neurological:  Negative for syncope.   Psychiatric/Behavioral:  Negative for behavioral problems. The patient is nervous/anxious.       OBJECTIVE   Vital Signs:BP (!) 160/80 (BP Location: Right arm, Patient Position: Sitting, BP Method: Large (Manual))   Pulse 61   Ht 5' 6" (1.676 m)   Wt 78 kg (171 lb 15.3 oz)   SpO2 98%   BMI 27.75 kg/m²    Vitals:    07/11/23 0958   Weight: 78 kg (171 lb 15.3 oz)   Height: 5' 6" (1.676 m)    Body mass index is 27.75 kg/m².    Physical Exam:  Physical Exam  Constitutional:       Appearance: He is well-developed.   HENT:      Head: Normocephalic and atraumatic.   Cardiovascular:      Rate and Rhythm: Normal rate.      Heart sounds: Normal heart sounds.   Pulmonary:      Effort: No respiratory distress.      Breath sounds: Normal breath sounds. No wheezing or rales.   Abdominal:      Palpations: Abdomen is soft.   Musculoskeletal:      Cervical back: Neck supple.   Skin:     General: Skin is dry.   Neurological:      Mental Status: He is alert.   Psychiatric:         Behavior: Behavior normal.       Previous Weight:  Wt Readings from Last 3 Encounters:   07/11/23 78 kg (171 lb 15.3 oz)   03/23/23 74.8 kg (165 lb)   01/09/23 73.9 kg (163 lb)        Cardiac Risk Screening:  The 10-year ASCVD risk score (Shira BENSON, et al., 2019) is: 13.2%    Values used to calculate the " score:      Age: 63 years      Sex: Male      Is Non- : No      Diabetic: No      Tobacco smoker: No      Systolic Blood Pressure: 152 mmHg      Is BP treated: No      HDL Cholesterol: 74 mg/dL      Total Cholesterol: 192 mg/dL     Diabetes Screening:  Hemoglobin A1C   Date Value Ref Range Status   01/10/2023 5.1 4.0 - 5.6 % Final     Comment:     ADA Screening Guidelines:  5.7-6.4%  Consistent with prediabetes  >or=6.5%  Consistent with diabetes    High levels of fetal hemoglobin interfere with the HbA1C  assay. Heterozygous hemoglobin variants (HbS, HgC, etc)do  not significantly interfere with this assay.   However, presence of multiple variants may affect accuracy.     08/03/2021 5.2 4.0 - 5.6 % Final     Comment:     ADA Screening Guidelines:  5.7-6.4%  Consistent with prediabetes  >or=6.5%  Consistent with diabetes    High levels of fetal hemoglobin interfere with the HbA1C  assay. Heterozygous hemoglobin variants (HbS, HgC, etc)do  not significantly interfere with this assay.   However, presence of multiple variants may affect accuracy.          Laboratory Results:  Lab Results   Component Value Date/Time    HGB 15.9 01/10/2023 10:02 AM    HCT 47.5 01/10/2023 10:02 AM    WBC 5.67 01/10/2023 10:02 AM     01/10/2023 10:02 AM    GLU 89 01/10/2023 10:02 AM    TSH 0.641 01/10/2023 10:02 AM    CHOL 192 01/10/2023 10:02 AM    HDL 74 01/10/2023 10:02 AM    TRIG 55 01/10/2023 10:02 AM    ALT 32 01/10/2023 10:02 AM    AST 31 01/10/2023 10:02 AM    K 4.6 01/10/2023 10:02 AM     01/10/2023 10:02 AM     01/10/2023 10:02 AM        Problem List:  Patient Active Problem List   Diagnosis    Ulcerative colitis    Dyspnea    Hx of tobacco use, presenting hazards to health    Posterior vitreous detachment, bilateral    Other vitreous opacities, bilateral    Other vitreous opacities, left eye    Other vitreous opacities, right eye    Glaucoma suspect of both eyes    Anxiety    GERD  (gastroesophageal reflux disease)    Acute pain of right shoulder    Dyslipidemia    COPD (chronic obstructive pulmonary disease)    Hemorrhoids    Polyp of colon       HEALTH MAINTENANCE PLANNING     Health Maintenance         Date Due Completion Date    Pneumococcal Vaccines (Age 65+) (1 - PCV) Never done ---    COVID-19 Vaccine (3 - Booster for Adrián series) 03/08/2022 1/11/2022    Abdominal Aortic Aneurysm Screening Never done ---    Influenza Vaccine (1) 09/01/2023 12/1/2022    Hemoglobin A1c (Diabetic Prevention Screening) 01/10/2026 1/10/2023    Lipid Panel 01/10/2028 1/10/2023    TETANUS VACCINE 07/23/2028 7/23/2018    Override on 2/10/2015: Declined    Colorectal Cancer Screening 01/28/2032 1/28/2022    Override on 1/1/2013: Done (see provider's note dated 2/10/15)            PERSONAL AND FAMILY HISTORY   Past medical, surgical, social, and family history: Reviewed and updated in EMR.   He has a past medical history of Anxiety, GERD (gastroesophageal reflux disease), Glaucoma, History of alcohol abuse, Ulcerative colitis, and Ulcerative colitis.    He has a past surgical history that includes Cataract extraction, bilateral; Tonsillectomy; anal fistula; Colonoscopy (2016); Vitrectomy by pars plana approach (Left, 11/28/2018); Vitrectomy by pars plana approach (Right, 12/12/2018); Colonoscopy (N/A, 1/28/2022); and Cataract extraction w/  intraocular lens implant (Bilateral, n/a).    His family history includes Cancer in his brother and sister; Cancer (age of onset: 75) in his father; Cataracts in his father and mother; Colon cancer (age of onset: 60) in his paternal grandfather; Colon cancer (age of onset: 70) in his father; Crohn's disease in his paternal uncle; Diabetes in his father; Glaucoma in his father and mother; Inflammatory bowel disease in his paternal uncle; Macular degeneration in his mother.    He reports that he quit smoking about 14 years ago. His smoking use included cigarettes. He has a  20.00 pack-year smoking history. He has quit using smokeless tobacco. He reports that he does not currently use alcohol after a past usage of about 8.0 standard drinks per week. He reports current drug use. Drug: Marijuana.    ASSESSMENT and PLAN         Anxiety  -     diazePAM (VALIUM) 10 MG Tab; Take 1 tablet (10 mg total) by mouth daily as needed for Anxiety (claustraphobia).  Dispense: 30 tablet; Refill: 1  -     EScitalopram oxalate (LEXAPRO) 10 MG tablet; Take 1 tablet (10 mg total) by mouth once daily.  Dispense: 90 tablet; Refill: 3    Claustrophobia  -     diazePAM (VALIUM) 10 MG Tab; Take 1 tablet (10 mg total) by mouth daily as needed for Anxiety (claustraphobia).  Dispense: 30 tablet; Refill: 1    Depression, unspecified depression type  -     EScitalopram oxalate (LEXAPRO) 10 MG tablet; Take 1 tablet (10 mg total) by mouth once daily.  Dispense: 90 tablet; Refill: 3    Elevated BP without diagnosis of hypertension  -reduce salt, stress  -f/u with new PCP    Health maintenance alteration  -      Abdominal Aorta; Future; Expected date: 07/11/2023    Dyslipidemia  -     atorvastatin (LIPITOR) 40 MG tablet; Take 1 tablet (40 mg total) by mouth every evening.  Dispense: 90 tablet; Refill: 3    Gastroesophageal reflux disease, unspecified whether esophagitis present  -     esomeprazole (NEXIUM) 20 MG capsule; Take 1 capsule (20 mg total) by mouth before breakfast.  Dispense: 90 capsule; Refill: 3    Chronic obstructive pulmonary disease, unspecified COPD type  -     albuterol (PROAIR HFA) 90 mcg/actuation inhaler; Inhale 2 puffs into the lungs every 6 (six) hours as needed for Wheezing or Shortness of Breath. Rescue  Dispense: 54 g; Refill: 3     Follow up in about 4 months (around 11/15/2023) for BP, anxiety with new doc.      Patient was counseled today on:  - Exercise (aiming for 20-30 minutes, 4-5 days per week) discussed  - Benefit of 15-20 minute post-prandial walk/stroll discussed  - Harmful effects  of smoking, etOH, and recreational drugs discussed    The above asssessment and plan was discussed with Dr. Lantigua      --  Paty Gonzalez, [facility]  Year 4 Medical Student  [employer]    I have seen pt and discussed all of the above issues with him, and examined him. I have edited the above medical student note to make it my own.   fer

## 2023-07-13 ENCOUNTER — PATIENT MESSAGE (OUTPATIENT)
Dept: INTERNAL MEDICINE | Facility: CLINIC | Age: 63
End: 2023-07-13
Payer: MEDICARE

## 2023-07-14 ENCOUNTER — HOSPITAL ENCOUNTER (OUTPATIENT)
Dept: RADIOLOGY | Facility: HOSPITAL | Age: 63
Discharge: HOME | End: 2023-07-14
Attending: INTERNAL MEDICINE
Payer: COMMERCIAL

## 2023-07-14 ENCOUNTER — HOSPITAL ENCOUNTER (OUTPATIENT)
Dept: RADIOLOGY | Facility: HOSPITAL | Age: 63
Discharge: HOME | End: 2023-07-14
Attending: FAMILY MEDICINE
Payer: COMMERCIAL

## 2023-07-14 DIAGNOSIS — J44.9 CHRONIC OBSTRUCTIVE PULMONARY DISEASE, UNSPECIFIED COPD TYPE: ICD-10-CM

## 2023-07-14 DIAGNOSIS — Z78.9 HEALTH MAINTENANCE ALTERATION: ICD-10-CM

## 2023-07-14 PROCEDURE — 71271 CT CHEST LUNG SCREENING LOW DOSE: ICD-10-PCS | Mod: 26,,, | Performed by: RADIOLOGY

## 2023-07-14 PROCEDURE — 76775 US ABDOMINAL AORTA: ICD-10-PCS | Mod: 26,,, | Performed by: RADIOLOGY

## 2023-07-14 PROCEDURE — 76775 US EXAM ABDO BACK WALL LIM: CPT | Mod: 26,,, | Performed by: RADIOLOGY

## 2023-07-14 PROCEDURE — 71271 CT THORAX LUNG CANCER SCR C-: CPT | Mod: TC,PO

## 2023-07-14 PROCEDURE — 76775 US EXAM ABDO BACK WALL LIM: CPT | Mod: TC,PO

## 2023-07-14 PROCEDURE — 71271 CT THORAX LUNG CANCER SCR C-: CPT | Mod: 26,,, | Performed by: RADIOLOGY

## 2023-07-23 NOTE — PROGRESS NOTES
HPI     Glaucoma     Additional comments: 6 month ck and OCT review today and pt states no   changes since last exam            Comments    DLS: 12/5/22    1. POAG OU  2. PCIOL OU  3. Ptosis  4. Hx PPVx OU    MEDS:  Latanoprost QHS OU            Last edited by Yenifer Sierra MA on 7/24/2023  3:30 PM.            Assessment /Plan     For exam results, see Encounter Report.    Primary open-angle glaucoma, bilateral, mild stage  -     latanoprost 0.005 % ophthalmic solution; Place 1 drop into both eyes once daily.  Dispense: 2.5 mL; Refill: 12    Acquired mechanical ptosis of eyelid, bilateral    Pseudophakia of both eyes    H/O vitrectomy          Lost to F/U from glaucoma clinic from 4/2019 to 2/2022 (3 years)   Has seen Dr Michel (2021) and Dr neumann (2022)      1.   Glaucoma (type and duration)   POAG / mild ou    First HVF   2019    First photos   2019   Treatment / Drops started   Cosopt BID OU - 2019            Family history    +father        Glaucoma meds    Cosopt BID ou         H/O adverse rxn to glaucoma drops    none        LASERS    none        GLAUCOMA SURGERIES    none        OTHER EYE SURGERIES  -  // PC IOL ou - multifocal - ? Where or when     PPVx   OU --s/p 25g PPV/partial AFx OS (11/28/18) --s/p 25g PPV/partial AFx OD (12/12/18) - For PVD w/ floaters ou          CDR    0.5/0.6 (sometimes called 0.3/0.4)         Tbase    16-28 // 16-35         Tmax    28/35            Ttarget    ?? About 21 ou            HVF    2 test 2019 to 2022 -full  od // non  Sp changes  os        Gonio    +3/+3        CCT    554/560        OCT    2 test 2019 to 2022 - RNFL - bord TI  od // nl  os        Disc photos    2019    - Ttoday    19//20 (target about 21 ou)   - Test done today  IOP // RNFL OCT     2. H/O PPVx ou    For severe floaters - ou - [place]     3. Pseudophakia   Multifocal ou    Monitor    4. Ptosis - vis sign by [place] VF's    nel was willing to operate - but his insurance - blue cross / blue shield - says  no , and so it is not covered by his insurance (2/2022)         PLAN   Change cosopt to latanoprost ou q day - can not remember bid dosing   Doing ok with latanoprsot 12/3/2022 - below target of 21     F/U 4 months with IOP - HVF / DFE / disc photos

## 2023-07-24 ENCOUNTER — OFFICE VISIT (OUTPATIENT)
Dept: OPHTHALMOLOGY | Facility: CLINIC | Age: 63
End: 2023-07-24
Payer: MEDICARE

## 2023-07-24 DIAGNOSIS — H02.413 ACQUIRED MECHANICAL PTOSIS OF EYELID, BILATERAL: ICD-10-CM

## 2023-07-24 DIAGNOSIS — Z96.1 PSEUDOPHAKIA OF BOTH EYES: ICD-10-CM

## 2023-07-24 DIAGNOSIS — Z98.890 H/O VITRECTOMY: ICD-10-CM

## 2023-07-24 DIAGNOSIS — H40.1131 PRIMARY OPEN-ANGLE GLAUCOMA, BILATERAL, MILD STAGE: Primary | ICD-10-CM

## 2023-07-24 PROCEDURE — 92133 POSTERIOR SEGMENT OCT OPTIC NERVE(OCULAR COHERENCE TOMOGRAPHY) - OU - BOTH EYES: ICD-10-PCS | Mod: 26,S$PBB,, | Performed by: OPHTHALMOLOGY

## 2023-07-24 PROCEDURE — 92133 CPTRZD OPH DX IMG PST SGM ON: CPT | Mod: PBBFAC | Performed by: OPHTHALMOLOGY

## 2023-07-24 PROCEDURE — 99214 OFFICE O/P EST MOD 30 MIN: CPT | Mod: S$PBB,,, | Performed by: OPHTHALMOLOGY

## 2023-07-24 PROCEDURE — 99213 OFFICE O/P EST LOW 20 MIN: CPT | Mod: PBBFAC | Performed by: OPHTHALMOLOGY

## 2023-07-24 PROCEDURE — 99214 PR OFFICE/OUTPT VISIT, EST, LEVL IV, 30-39 MIN: ICD-10-PCS | Mod: S$PBB,,, | Performed by: OPHTHALMOLOGY

## 2023-07-24 PROCEDURE — 99999 PR PBB SHADOW E&M-EST. PATIENT-LVL III: CPT | Mod: PBBFAC,,, | Performed by: OPHTHALMOLOGY

## 2023-07-24 PROCEDURE — 99999 PR PBB SHADOW E&M-EST. PATIENT-LVL III: ICD-10-PCS | Mod: PBBFAC,,, | Performed by: OPHTHALMOLOGY

## 2023-07-24 RX ORDER — LATANOPROST 50 UG/ML
1 SOLUTION/ DROPS OPHTHALMIC DAILY
Qty: 2.5 ML | Refills: 12 | Status: SHIPPED | OUTPATIENT
Start: 2023-07-24 | End: 2023-11-13 | Stop reason: SDUPTHER

## 2023-08-24 ENCOUNTER — PATIENT MESSAGE (OUTPATIENT)
Dept: INTERNAL MEDICINE | Facility: CLINIC | Age: 63
End: 2023-08-24
Payer: MEDICARE

## 2023-08-28 DIAGNOSIS — K21.9 GASTROESOPHAGEAL REFLUX DISEASE, UNSPECIFIED WHETHER ESOPHAGITIS PRESENT: ICD-10-CM

## 2023-08-28 RX ORDER — HYDROGEN PEROXIDE 3 %
20 SOLUTION, NON-ORAL MISCELLANEOUS
Qty: 90 CAPSULE | Refills: 3 | Status: SHIPPED | OUTPATIENT
Start: 2023-08-28 | End: 2023-11-13 | Stop reason: SDUPTHER

## 2023-08-28 NOTE — TELEPHONE ENCOUNTER
No care due was identified.  [facility] Embedded Care Due Messages. Reference number: [id number].   8/28/2023 9:54:21 AM CDT

## 2023-09-06 NOTE — LETTER
November 8, 2018      Eddie Michel, OD  [address] [address]           [place] [facility]  [address]  [place] [address]  Phone: [phone]  Fax: [phone]          Patient: Azam Bowden   MR Number: [id number]   YOB: 1960   Date of Visit: 11/8/2018       Dear Dr. Eddie Michel:    Thank you for referring Azam Bowden to me for evaluation. Attached you will find relevant portions of my assessment and plan of care.    If you have questions, please do not hesitate to call me. I look forward to following Azam Bowden along with you.    Sincerely,    DIANDRA Rushing MD    Enclosure  CC:  No Recipients    If you would like to receive this communication electronically, please contact [email or web address] or [phone] to request more information on [employer] access.    For providers and/or their staff who would like to refer a patient to [facility], please contact us through our one-stop-shop provider referral line, [facility], at [phone].    If you feel you have received this communication in error or would no longer like to receive these types of communications, please e-mail [email or web address]           Symptoms

## 2023-11-13 ENCOUNTER — OFFICE VISIT (OUTPATIENT)
Dept: INTERNAL MEDICINE | Facility: CLINIC | Age: 63
End: 2023-11-13
Payer: MEDICARE

## 2023-11-13 VITALS
HEART RATE: 99 BPM | WEIGHT: 170.19 LBS | SYSTOLIC BLOOD PRESSURE: 140 MMHG | DIASTOLIC BLOOD PRESSURE: 82 MMHG | OXYGEN SATURATION: 97 % | HEIGHT: 66 IN | BODY MASS INDEX: 27.35 KG/M2

## 2023-11-13 DIAGNOSIS — K21.9 GASTROESOPHAGEAL REFLUX DISEASE, UNSPECIFIED WHETHER ESOPHAGITIS PRESENT: ICD-10-CM

## 2023-11-13 DIAGNOSIS — F41.9 ANXIETY: ICD-10-CM

## 2023-11-13 DIAGNOSIS — Z00.01 ENCOUNTER FOR ANNUAL GENERAL MEDICAL EXAMINATION WITH ABNORMAL FINDINGS IN ADULT: Primary | ICD-10-CM

## 2023-11-13 DIAGNOSIS — E78.5 DYSLIPIDEMIA: ICD-10-CM

## 2023-11-13 DIAGNOSIS — J44.9 CHRONIC OBSTRUCTIVE PULMONARY DISEASE, UNSPECIFIED COPD TYPE: ICD-10-CM

## 2023-11-13 DIAGNOSIS — H40.1131 PRIMARY OPEN-ANGLE GLAUCOMA, BILATERAL, MILD STAGE: ICD-10-CM

## 2023-11-13 DIAGNOSIS — F40.240 CLAUSTROPHOBIA: ICD-10-CM

## 2023-11-13 DIAGNOSIS — F32.A DEPRESSION, UNSPECIFIED DEPRESSION TYPE: ICD-10-CM

## 2023-11-13 PROCEDURE — 99214 OFFICE O/P EST MOD 30 MIN: CPT | Mod: PBBFAC | Performed by: FAMILY MEDICINE

## 2023-11-13 PROCEDURE — 99397 PR PREVENTIVE VISIT,EST,65 & OVER: ICD-10-PCS | Mod: S$PBB,GZ,, | Performed by: FAMILY MEDICINE

## 2023-11-13 PROCEDURE — 99999 PR PBB SHADOW E&M-EST. PATIENT-LVL IV: ICD-10-PCS | Mod: PBBFAC,,, | Performed by: FAMILY MEDICINE

## 2023-11-13 PROCEDURE — 99999 PR PBB SHADOW E&M-EST. PATIENT-LVL IV: CPT | Mod: PBBFAC,,, | Performed by: FAMILY MEDICINE

## 2023-11-13 PROCEDURE — 99397 PER PM REEVAL EST PAT 65+ YR: CPT | Mod: S$PBB,GZ,, | Performed by: FAMILY MEDICINE

## 2023-11-13 RX ORDER — ATORVASTATIN CALCIUM 40 MG/1
40 TABLET, FILM COATED ORAL NIGHTLY
Qty: 90 TABLET | Refills: 3 | Status: SHIPPED | OUTPATIENT
Start: 2023-11-13 | End: 2023-12-11

## 2023-11-13 RX ORDER — ESCITALOPRAM OXALATE 10 MG/1
10 TABLET ORAL DAILY
Qty: 90 TABLET | Refills: 3 | Status: SHIPPED | OUTPATIENT
Start: 2023-11-13 | End: 2024-11-12

## 2023-11-13 RX ORDER — LATANOPROST 50 UG/ML
1 SOLUTION/ DROPS OPHTHALMIC DAILY
Qty: 2.5 ML | Refills: 12 | Status: SHIPPED | OUTPATIENT
Start: 2023-11-13

## 2023-11-13 RX ORDER — DIAZEPAM 10 MG/1
10 TABLET ORAL DAILY PRN
Qty: 30 TABLET | Refills: 1 | Status: SHIPPED | OUTPATIENT
Start: 2023-11-13

## 2023-11-13 RX ORDER — ALBUTEROL SULFATE 90 UG/1
2 AEROSOL, METERED RESPIRATORY (INHALATION) EVERY 6 HOURS PRN
Qty: 54 G | Refills: 3 | Status: SHIPPED | OUTPATIENT
Start: 2023-11-13

## 2023-11-13 RX ORDER — HYDROGEN PEROXIDE 3 %
20 SOLUTION, NON-ORAL MISCELLANEOUS
Qty: 90 CAPSULE | Refills: 3 | Status: SHIPPED | OUTPATIENT
Start: 2023-11-13

## 2023-11-13 NOTE — PATIENT INSTRUCTIONS
Fasting labs ([place])  Prescriptions sent to pharmacy  I'll be in touch with your lab results when they come back.

## 2023-11-13 NOTE — PROGRESS NOTES
Subjective:     Patient ID: Azam Bowden Jr. is a 63 y.o. male.   Chief Complaint: Annual Exam    HPI:  Patient presents today for annual exam. Pt needs fasting labs. Also needs refills of medications.    Health Maintenance:  Colon Cancer Screening  Previous study completed on 1/28/2022  Type of Study: Colonoscopy  Findings: Ulcerative colitis (known dx)  Clearance: following with GI (Dr. Reza )  Lung Cancer Screening  Previous study - July 2023  Findings: Normal  Prostate Cancer Screening  Patient requests screening  Last screening: Jan 2023  Prior results: PSA: Last value 3.5  Lab Results   Component Value Date    PSA 3.5 01/10/2023    PSA 2.2 08/03/2021    PSA 2.7 07/26/2019     ASCVD Risk  The 10-year ASCVD risk score (Shira BENSON, et al., 2019) is: 11.5%    Values used to calculate the score:      Age: 63 years      Sex: Male      Is Non- : No      Diabetic: No      Tobacco smoker: No      Systolic Blood Pressure: 140 mmHg      Is BP treated: No      HDL Cholesterol: 74 mg/dL      Total Cholesterol: 192 mg/dL   Lab Results   Component Value Date    CHOL 192 01/10/2023    HDL 74 01/10/2023    TRIG 55 01/10/2023     Statin rx: yes  Diet  The patient reports no specific efforts to gain or lose weight.  Nothing specific for diet    Vaccines  Seasonal Influenza: UTD  COVID-19: Declined  RSV: Declined  Tetanus: UTD  Shingles: UTD  Pneumococcal: UTD    Review of Systems   Constitutional:  Negative for chills, fatigue and fever.   HENT:  Negative for nasal congestion, ear pain, postnasal drip, sinus pressure/congestion and sore throat.    Eyes:  Negative for visual disturbance.   Respiratory:  Negative for cough, shortness of breath and wheezing.    Cardiovascular:  Negative for chest pain and palpitations.   Gastrointestinal:  Negative for abdominal pain, change in bowel habit, constipation, diarrhea, nausea and vomiting.   Genitourinary:  Negative for dysuria and hematuria.  "  Musculoskeletal:  Negative for back pain, leg pain and neck pain.   Allergic/Immunologic: Negative for environmental allergies.   Neurological:  Negative for dizziness, numbness and headaches.   Hematological:  Negative for adenopathy.   Psychiatric/Behavioral:  Negative for dysphoric mood, sleep disturbance and suicidal ideas. The patient is not nervous/anxious.    All other systems reviewed and are negative.         Objective:      Vitals:    11/13/23 1147   BP: (!) 140/82   BP Location: Left arm   Patient Position: Sitting   BP Method: Medium (Manual)   Pulse: 99   SpO2: 97%   Weight: 77.2 kg (170 lb 3.1 oz)   Height: 5' 6" (1.676 m)      Physical Exam  Vitals and nursing note reviewed.   Constitutional:       General: He is not in acute distress.     Appearance: Normal appearance. He is not ill-appearing.   HENT:      Head: Normocephalic and atraumatic.      Right Ear: Tympanic membrane, ear canal and external ear normal. There is no impacted cerumen.      Left Ear: Tympanic membrane, ear canal and external ear normal. There is no impacted cerumen.      Nose: Nose normal. No congestion or rhinorrhea.      Mouth/Throat:      Mouth: Mucous membranes are moist.      Pharynx: Oropharynx is clear. No oropharyngeal exudate or posterior oropharyngeal erythema.   Eyes:      General: No scleral icterus.        Right eye: No discharge.         Left eye: No discharge.      Conjunctiva/sclera: Conjunctivae normal.   Cardiovascular:      Rate and Rhythm: Normal rate and regular rhythm.      Pulses: Normal pulses.      Heart sounds: Normal heart sounds. No murmur heard.     No friction rub. No gallop.   Pulmonary:      Effort: Pulmonary effort is normal. No respiratory distress.      Breath sounds: Normal breath sounds. No wheezing, rhonchi or rales.   Abdominal:      General: Abdomen is flat. Bowel sounds are normal.      Palpations: Abdomen is soft.      Tenderness: There is no abdominal tenderness.   Musculoskeletal:    "      General: No swelling or deformity. Normal range of motion.      Cervical back: Normal range of motion and neck supple. No tenderness.   Skin:     General: Skin is warm and dry.   Neurological:      General: No focal deficit present.      Mental Status: He is alert and oriented to person, place, and time. Mental status is at baseline.      Gait: Gait normal.   Psychiatric:         Mood and Affect: Mood normal.         Behavior: Behavior normal.         Thought Content: Thought content normal.         Judgment: Judgment normal.           Assessment:       Problem List Items Addressed This Visit          Psychiatric    Anxiety    Relevant Medications    EScitalopram oxalate (LEXAPRO) 10 MG tablet    diazePAM (VALIUM) 10 MG Tab    Depression    Relevant Medications    EScitalopram oxalate (LEXAPRO) 10 MG tablet       Ophtho    Primary open-angle glaucoma, bilateral, mild stage    Relevant Medications    latanoprost 0.005 % ophthalmic solution       Pulmonary    COPD (chronic obstructive pulmonary disease)    Relevant Medications    albuterol (PROAIR HFA) 90 mcg/actuation inhaler       Cardiac/Vascular    Dyslipidemia    Relevant Medications    atorvastatin (LIPITOR) 40 MG tablet       GI    GERD (gastroesophageal reflux disease)    Relevant Medications    esomeprazole (NEXIUM) 20 MG capsule     Other Visit Diagnoses       Encounter for annual general medical examination with abnormal findings in adult    -  Primary    Relevant Orders    Comprehensive Metabolic Panel    CBC Without Differential    Lipid Panel    Hemoglobin A1C    TSH    T4, FREE    PSA, Screening    Claustrophobia        Relevant Medications    diazePAM (VALIUM) 10 MG Tab              Plan:       1. Encounter for annual general medical examination with abnormal findings in adult  Reviewed chronic medical conditions, updated problem list and medication list  Updated health maintenance today  Pt declined COVID and RSV vaccines today  Reports he is UTD  on influenza this season  -     Comprehensive Metabolic Panel; Future; Expected date: 11/13/2023  -     CBC Without Differential; Future; Expected date: 11/13/2023  -     Lipid Panel; Future; Expected date: 11/13/2023  -     Hemoglobin A1C; Future; Expected date: 11/13/2023  -     TSH; Future; Expected date: 11/13/2023  -     T4, FREE; Future; Expected date: 11/13/2023  -     PSA, Screening; Future; Expected date: 11/13/2023    2. Anxiety  Chronic, stable  Refill Lexapro and Valium today  Reviewed safety for use of both medications  Orders:  -     EScitalopram oxalate (LEXAPRO) 10 MG tablet; Take 1 tablet (10 mg total) by mouth once daily.  Dispense: 90 tablet; Refill: 3  -     diazePAM (VALIUM) 10 MG Tab; Take 1 tablet (10 mg total) by mouth daily as needed (claustraphobia).  Dispense: 30 tablet; Refill: 1    3. Depression, unspecified depression type  See above  -     EScitalopram oxalate (LEXAPRO) 10 MG tablet; Take 1 tablet (10 mg total) by mouth once daily.  Dispense: 90 tablet; Refill: 3    4. Claustrophobia  See above  -     diazePAM (VALIUM) 10 MG Tab; Take 1 tablet (10 mg total) by mouth daily as needed (claustraphobia).  Dispense: 30 tablet; Refill: 1    5. Dyslipidemia  Stable, continue lipitor  Follow-up fasting labs  -     atorvastatin (LIPITOR) 40 MG tablet; Take 1 tablet (40 mg total) by mouth every evening.  Dispense: 90 tablet; Refill: 3    6. Chronic obstructive pulmonary disease, unspecified COPD type  Stable, refill albuterol  -     albuterol (PROAIR HFA) 90 mcg/actuation inhaler; Inhale 2 puffs into the lungs every 6 (six) hours as needed for Wheezing or Shortness of Breath. Rescue  Dispense: 54 g; Refill: 3    7. Gastroesophageal reflux disease, unspecified whether esophagitis present  Stable, refill nexium  -     esomeprazole (NEXIUM) 20 MG capsule; Take 1 capsule (20 mg total) by mouth before breakfast.  Dispense: 90 capsule; Refill: 3    8. Primary open-angle glaucoma, bilateral, mild  stage  Stable, refill latanoprost  -     latanoprost 0.005 % ophthalmic solution; Place 1 drop into both eyes once daily.  Dispense: 2.5 mL; Refill: 12             Follow up in about 1 year (around 11/13/2024).     James Fine MD, [facility]  Family Medicine Physician  [facility]  11/13/2023

## 2023-11-14 ENCOUNTER — LAB VISIT (OUTPATIENT)
Dept: LAB | Facility: HOSPITAL | Age: 63
End: 2023-11-14
Attending: FAMILY MEDICINE
Payer: MEDICARE

## 2023-11-14 DIAGNOSIS — Z00.01 ENCOUNTER FOR ANNUAL GENERAL MEDICAL EXAMINATION WITH ABNORMAL FINDINGS IN ADULT: ICD-10-CM

## 2023-11-14 LAB
ALBUMIN SERPL BCP-MCNC: 4 G/DL (ref 3.5–5.2)
ALP SERPL-CCNC: 45 U/L (ref 55–135)
ALT SERPL W/O P-5'-P-CCNC: 27 U/L (ref 10–44)
ANION GAP SERPL CALC-SCNC: 11 MMOL/L (ref 8–16)
AST SERPL-CCNC: 29 U/L (ref 10–40)
BILIRUB SERPL-MCNC: 0.7 MG/DL (ref 0.1–1)
BUN SERPL-MCNC: 12 MG/DL (ref 8–23)
CALCIUM SERPL-MCNC: 9.9 MG/DL (ref 8.7–10.5)
CHLORIDE SERPL-SCNC: 105 MMOL/L (ref 95–110)
CHOLEST SERPL-MCNC: 193 MG/DL (ref 120–199)
CHOLEST/HDLC SERPL: 2.6 {RATIO} (ref 2–5)
CO2 SERPL-SCNC: 25 MMOL/L (ref 23–29)
COMPLEXED PSA SERPL-MCNC: 2.7 NG/ML (ref 0–4)
CREAT SERPL-MCNC: 1.1 MG/DL (ref 0.5–1.4)
ERYTHROCYTE [DISTWIDTH] IN BLOOD BY AUTOMATED COUNT: 12.9 % (ref 11.5–14.5)
EST. GFR  (NO RACE VARIABLE): >60 ML/MIN/1.73 M^2
ESTIMATED AVG GLUCOSE: 103 MG/DL (ref 68–131)
GLUCOSE SERPL-MCNC: 89 MG/DL (ref 70–110)
HBA1C MFR BLD: 5.2 % (ref 4–5.6)
HCT VFR BLD AUTO: 47.3 % (ref 40–54)
HDLC SERPL-MCNC: 74 MG/DL (ref 40–75)
HDLC SERPL: 38.3 % (ref 20–50)
HGB BLD-MCNC: 15.9 G/DL (ref 14–18)
LDLC SERPL CALC-MCNC: 95.6 MG/DL (ref 63–159)
MCH RBC QN AUTO: 29.6 PG (ref 27–31)
MCHC RBC AUTO-ENTMCNC: 33.6 G/DL (ref 32–36)
MCV RBC AUTO: 88 FL (ref 82–98)
NONHDLC SERPL-MCNC: 119 MG/DL
PLATELET # BLD AUTO: 251 K/UL (ref 150–450)
PMV BLD AUTO: 10.5 FL (ref 9.2–12.9)
POTASSIUM SERPL-SCNC: 4.4 MMOL/L (ref 3.5–5.1)
PROT SERPL-MCNC: 7.3 G/DL (ref 6–8.4)
RBC # BLD AUTO: 5.38 M/UL (ref 4.6–6.2)
SODIUM SERPL-SCNC: 141 MMOL/L (ref 136–145)
T4 FREE SERPL-MCNC: 0.89 NG/DL (ref 0.71–1.51)
TRIGL SERPL-MCNC: 117 MG/DL (ref 30–150)
TSH SERPL DL<=0.005 MIU/L-ACNC: 1.32 UIU/ML (ref 0.4–4)
WBC # BLD AUTO: 6.31 K/UL (ref 3.9–12.7)

## 2023-11-14 PROCEDURE — 84153 ASSAY OF PSA TOTAL: CPT | Performed by: FAMILY MEDICINE

## 2023-11-14 PROCEDURE — 80053 COMPREHEN METABOLIC PANEL: CPT | Performed by: FAMILY MEDICINE

## 2023-11-14 PROCEDURE — 84439 ASSAY OF FREE THYROXINE: CPT | Performed by: FAMILY MEDICINE

## 2023-11-14 PROCEDURE — 84443 ASSAY THYROID STIM HORMONE: CPT | Performed by: FAMILY MEDICINE

## 2023-11-14 PROCEDURE — 36415 COLL VENOUS BLD VENIPUNCTURE: CPT | Mod: PO | Performed by: FAMILY MEDICINE

## 2023-11-14 PROCEDURE — 83036 HEMOGLOBIN GLYCOSYLATED A1C: CPT | Performed by: FAMILY MEDICINE

## 2023-11-14 PROCEDURE — 80061 LIPID PANEL: CPT | Performed by: FAMILY MEDICINE

## 2023-11-14 PROCEDURE — 85027 COMPLETE CBC AUTOMATED: CPT | Performed by: FAMILY MEDICINE

## 2023-11-15 ENCOUNTER — PATIENT MESSAGE (OUTPATIENT)
Dept: INTERNAL MEDICINE | Facility: CLINIC | Age: 63
End: 2023-11-15
Payer: MEDICARE

## 2023-11-25 NOTE — PROGRESS NOTES
HPI    DLS: 7/24/2023    Pt here for HVF review/OCT;  Pt states every now and then he would notice a floater, denies any flashes   of light or diplopia.    Meds;  Latanoprost QHS OU    1. POAG OU   2. PCIOL OU   3. Ptosis   4. Hx PPVx OU     Last edited by Amparo Worthy on 11/27/2023 11:50 AM.            Assessment /Plan     For exam results, see Encounter Report.    Primary open-angle glaucoma, bilateral, mild stage    Acquired mechanical ptosis of eyelid, bilateral    Posterior vitreous detachment, bilateral    Pseudophakia of both eyes    H/O vitrectomy          Lost to F/U from glaucoma clinic from 4/2019 to 2/2022 (3 years)   Has seen Dr Michel (2021) and Dr neumann (2022)      1.   Glaucoma (type and duration)   POAG / mild ou    First HVF   2019    First photos   2019   Treatment / Drops started   Cosopt BID OU - 2019            Family history    +father        Glaucoma meds    Cosopt BID ou         H/O adverse rxn to glaucoma drops    none        LASERS    none        GLAUCOMA SURGERIES    none        OTHER EYE SURGERIES  -  // PC IOL ou - multifocal - ? Where or when     PPVx   OU --s/p 25g PPV/partial AFx OS (11/28/18) --s/p 25g PPV/partial AFx OD (12/12/18) - For PVD w/ floaters ou          CDR    0.5/0.6 (sometimes called 0.3/0.4)         Tbase    16-28 // 16-35         Tmax    28/35            Ttarget    ?? About 21 ou            HVF    2 test 2019 to 2022 -full  od // non  Sp changes  os        Gonio    +3/+3        CCT    554/560        OCT    2 test 2019 to 2022 - RNFL - bord TI  od // nl  os        Disc photos    2019    - Ttoday    19//20 (target about 21 ou)   - Test done today  IOP // HVF / DFE / OCT (was suppose to be photos )     2. H/O PPVx ou    For severe floaters - ou - [place]     3. Pseudophakia   Multifocal ou    Monitor    4. Ptosis - vis sign by walt RODRIGUEZ's    nel was willing to operate - but his insurance - blue cross / blue shield - says no , and so it is not covered by his  insurance (2/2022)         PLAN   Change cosopt to latanoprost ou q day - can not remember bid dosing   Doing ok with latanoprsot 12/3/2022 - below target of 21     F/U 6-9  months with IOP -/ gonio  (still needs new disc photos in future last done 2019 )

## 2023-11-27 ENCOUNTER — OFFICE VISIT (OUTPATIENT)
Dept: OPHTHALMOLOGY | Facility: CLINIC | Age: 63
End: 2023-11-27
Payer: MEDICARE

## 2023-11-27 ENCOUNTER — CLINICAL SUPPORT (OUTPATIENT)
Dept: OPHTHALMOLOGY | Facility: CLINIC | Age: 63
End: 2023-11-27
Payer: COMMERCIAL

## 2023-11-27 DIAGNOSIS — Z98.890 H/O VITRECTOMY: ICD-10-CM

## 2023-11-27 DIAGNOSIS — Z96.1 PSEUDOPHAKIA OF BOTH EYES: ICD-10-CM

## 2023-11-27 DIAGNOSIS — H43.813 POSTERIOR VITREOUS DETACHMENT, BILATERAL: ICD-10-CM

## 2023-11-27 DIAGNOSIS — H02.413 ACQUIRED MECHANICAL PTOSIS OF EYELID, BILATERAL: ICD-10-CM

## 2023-11-27 DIAGNOSIS — H40.1131 PRIMARY OPEN-ANGLE GLAUCOMA, BILATERAL, MILD STAGE: Primary | ICD-10-CM

## 2023-11-27 PROCEDURE — 99999 PR PBB SHADOW E&M-EST. PATIENT-LVL III: ICD-10-PCS | Mod: PBBFAC,,, | Performed by: OPHTHALMOLOGY

## 2023-11-27 PROCEDURE — 99999 PR PBB SHADOW E&M-EST. PATIENT-LVL III: CPT | Mod: PBBFAC,,, | Performed by: OPHTHALMOLOGY

## 2023-11-27 PROCEDURE — 99214 OFFICE O/P EST MOD 30 MIN: CPT | Mod: S$PBB,,, | Performed by: OPHTHALMOLOGY

## 2023-11-27 PROCEDURE — 92133 CPTRZD OPH DX IMG PST SGM ON: CPT | Mod: PBBFAC | Performed by: OPHTHALMOLOGY

## 2023-11-27 PROCEDURE — 92083 HUMPHREY VISUAL FIELD - OU - BOTH EYES: ICD-10-PCS | Mod: 26,S$PBB,, | Performed by: OPHTHALMOLOGY

## 2023-11-27 PROCEDURE — 99214 PR OFFICE/OUTPT VISIT, EST, LEVL IV, 30-39 MIN: ICD-10-PCS | Mod: S$PBB,,, | Performed by: OPHTHALMOLOGY

## 2023-11-27 PROCEDURE — 99213 OFFICE O/P EST LOW 20 MIN: CPT | Mod: PBBFAC | Performed by: OPHTHALMOLOGY

## 2023-11-27 PROCEDURE — 92083 EXTENDED VISUAL FIELD XM: CPT | Mod: PBBFAC | Performed by: OPHTHALMOLOGY

## 2023-11-27 PROCEDURE — 92133 POSTERIOR SEGMENT OCT OPTIC NERVE(OCULAR COHERENCE TOMOGRAPHY) - OU - BOTH EYES: ICD-10-PCS | Mod: 26,S$PBB,, | Performed by: OPHTHALMOLOGY

## 2023-11-27 NOTE — PROGRESS NOTES
Visual field test done.  Patient stated no latex allergies used coverlet    Rel coop, fix   good ou           Sphere Cylinder Axis Add   Right -0.25 +0.75 180 +2.50   Left -0.25 +0.75 020 +2.50

## 2023-12-06 DIAGNOSIS — E78.5 DYSLIPIDEMIA: ICD-10-CM

## 2023-12-06 NOTE — TELEPHONE ENCOUNTER
Refill Routing Note   Medication(s) are not appropriate for processing by [facility] for the following reason(s):        Non-participating provider  Other(refill too soon, patient should check with his pharmacy)    [facility] action(s):  Route        Medication Therapy Plan: this was sent 3 weeks ago to pharmacy. Adherence data does not show this order filled yet as it may have been too soon. Patient should check with the pharmacy      Appointments  past 12m or future 3m with PCP    Date Provider   Last Visit   11/13/2023 James Fine MD   Next Visit   Visit date not found James Fine MD   ED visits in past 90 days: 0        Note composed:1:16 PM 12/06/2023

## 2023-12-11 RX ORDER — ATORVASTATIN CALCIUM 40 MG/1
40 TABLET, FILM COATED ORAL NIGHTLY
Qty: 90 TABLET | Refills: 3 | Status: SHIPPED | OUTPATIENT
Start: 2023-12-11

## 2024-01-03 ENCOUNTER — PATIENT MESSAGE (OUTPATIENT)
Dept: INTERNAL MEDICINE | Facility: CLINIC | Age: 64
End: 2024-01-03
Payer: MEDICARE

## 2024-05-08 ENCOUNTER — TELEPHONE (OUTPATIENT)
Dept: INTERNAL MEDICINE | Facility: CLINIC | Age: 64
End: 2024-05-08
Payer: MEDICARE

## 2024-05-08 DIAGNOSIS — Z78.9 ALCOHOL USE: Primary | ICD-10-CM

## 2024-05-08 NOTE — TELEPHONE ENCOUNTER
----- Message from Jami Javed MA sent at 5/8/2024  9:36 AM CDT -----  Contact: Pt [phone]    ----- Message -----  From: Luisa Hernandez  Sent: 5/8/2024   9:19 AM CDT  To: Babatunde Ramirez Staff    Consult    He wants help with stopping drinking acholol and he wants to know if there is something you can prescribe.    [facility] [place] [address] Phone: [phone]  Fax: [phone]    Thank you

## 2024-05-14 ENCOUNTER — PATIENT MESSAGE (OUTPATIENT)
Dept: INTERNAL MEDICINE | Facility: CLINIC | Age: 64
End: 2024-05-14
Payer: MEDICARE

## 2024-05-14 DIAGNOSIS — Z78.9 ALCOHOL USE: Primary | ICD-10-CM

## 2024-05-15 NOTE — TELEPHONE ENCOUNTER
Pt reaching out to PCP for assistance in abstaining from ETOH. Requesting rx or inpatient tx. Prefer to schedule appt to discuss options?
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
## 2024-06-05 PROBLEM — L08.9 INFECTED DOG BITE: Status: ACTIVE | Noted: 2024-06-05

## 2024-06-10 ENCOUNTER — PATIENT MESSAGE (OUTPATIENT)
Dept: INTERNAL MEDICINE | Facility: CLINIC | Age: 64
End: 2024-06-10
Payer: MEDICARE

## 2024-06-14 ENCOUNTER — TELEPHONE (OUTPATIENT)
Dept: INTERNAL MEDICINE | Facility: CLINIC | Age: 64
End: 2024-06-14
Payer: MEDICARE

## 2024-06-14 NOTE — TELEPHONE ENCOUNTER
I called patient to get him scheduled for a follow up visit , pt was unavailable I left a detailed message.

## 2024-06-14 NOTE — TELEPHONE ENCOUNTER
----- Message from Maria Luisa Ann sent at 6/7/2024  9:37 AM CDT -----  Contact: [phone]  Pt is being d/[place] from [place] today and they are requesting a hosp f/u for the pt to be seen by their PCP by 6/14/2024. Can you please call pt to discuss?

## 2024-09-06 ENCOUNTER — TELEPHONE (OUTPATIENT)
Dept: OPHTHALMOLOGY | Facility: CLINIC | Age: 64
End: 2024-09-06
Payer: MEDICARE

## 2024-09-06 NOTE — TELEPHONE ENCOUNTER
----- Message from Carlita Lopez sent at 9/6/2024  1:25 PM CDT -----  Regarding: Scheduling Request  Contact: Joaquina ocampo  Scheduling Request           Appt Type:  EP     Date/Time Preference:any     Treating Provider:Harini     Caller Name:Joaquina ocampo     Contact Preference:[phone]     Comments/notes:Patients wife is calling to schedule appt and has questions. Requesting a call back

## 2024-10-11 ENCOUNTER — TELEPHONE (OUTPATIENT)
Dept: OPHTHALMOLOGY | Facility: CLINIC | Age: 64
End: 2024-10-11
Payer: MEDICARE

## 2024-10-11 NOTE — TELEPHONE ENCOUNTER
----- Message from Srikanth Mcconnell sent at 10/8/2024  9:47 AM CDT -----  Regarding: FW: Appt Inquiry  Ptosis silver DL: 2022  ----- Message -----  From: Julienne Miranda  Sent: 10/8/2024   9:41 AM CDT  To: Tirso Marcus Staff  Subject: Appt Inquiry                                     Scheduling Request           Appt Type:EP     Date/Time Preference:First Available     Treating Provider:Angeline Chahal MD        Caller Name:Azam Bowden     Contact Preference:[phone]     Comments/notes:Patient was seen in 2022 and denied by insurance. Patient was advised by office to wait until he has medicare. Patient would like to speak to office about scheduling surgery.

## 2024-10-17 ENCOUNTER — TELEPHONE (OUTPATIENT)
Dept: OPHTHALMOLOGY | Facility: CLINIC | Age: 64
End: 2024-10-17
Payer: MEDICARE

## 2024-10-17 NOTE — TELEPHONE ENCOUNTER
----- Message from Julienne sent at 10/17/2024 10:19 AM CDT -----  Regarding: Returning Missed Call  Type:  Patient Returning Call    Who Called:Azam Bowden    Who Left Message for Patient:Carlota Howard     Does the patient know what this is regarding?:Scheduling     Would the patient rather a call back or a response via [facility]? Call back    Best Call Back Number:[phone]    Additional Information:

## 2024-10-21 DIAGNOSIS — H40.1131 PRIMARY OPEN-ANGLE GLAUCOMA, BILATERAL, MILD STAGE: ICD-10-CM

## 2024-10-22 RX ORDER — LATANOPROST 50 UG/ML
1 SOLUTION/ DROPS OPHTHALMIC DAILY
Qty: 2.5 ML | Refills: 12 | Status: SHIPPED | OUTPATIENT
Start: 2024-10-22

## 2024-12-11 ENCOUNTER — PATIENT MESSAGE (OUTPATIENT)
Dept: PSYCHIATRY | Facility: CLINIC | Age: 64
End: 2024-12-11
Payer: MEDICARE

## 2024-12-11 ENCOUNTER — OFFICE VISIT (OUTPATIENT)
Dept: FAMILY MEDICINE | Facility: CLINIC | Age: 64
End: 2024-12-11
Payer: MEDICARE

## 2024-12-11 VITALS
WEIGHT: 165.44 LBS | HEART RATE: 58 BPM | DIASTOLIC BLOOD PRESSURE: 96 MMHG | HEIGHT: 66 IN | OXYGEN SATURATION: 99 % | BODY MASS INDEX: 26.59 KG/M2 | SYSTOLIC BLOOD PRESSURE: 150 MMHG

## 2024-12-11 DIAGNOSIS — K21.9 GASTROESOPHAGEAL REFLUX DISEASE, UNSPECIFIED WHETHER ESOPHAGITIS PRESENT: Chronic | ICD-10-CM

## 2024-12-11 DIAGNOSIS — K51.90 ULCERATIVE COLITIS WITHOUT COMPLICATIONS, UNSPECIFIED LOCATION: Chronic | ICD-10-CM

## 2024-12-11 DIAGNOSIS — J44.9 CHRONIC OBSTRUCTIVE PULMONARY DISEASE, UNSPECIFIED COPD TYPE: Chronic | ICD-10-CM

## 2024-12-11 DIAGNOSIS — F41.1 GENERALIZED ANXIETY DISORDER WITH PANIC ATTACKS: Primary | Chronic | ICD-10-CM

## 2024-12-11 DIAGNOSIS — E78.5 DYSLIPIDEMIA: Chronic | ICD-10-CM

## 2024-12-11 DIAGNOSIS — I10 PRIMARY HYPERTENSION: ICD-10-CM

## 2024-12-11 DIAGNOSIS — F32.A DEPRESSION, UNSPECIFIED DEPRESSION TYPE: Chronic | ICD-10-CM

## 2024-12-11 DIAGNOSIS — F41.0 GENERALIZED ANXIETY DISORDER WITH PANIC ATTACKS: Primary | Chronic | ICD-10-CM
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
## 2024-12-11 PROBLEM — K63.5 POLYP OF COLON: Status: RESOLVED | Noted: 2023-01-09 | Resolved: 2024-12-11

## 2024-12-11 PROBLEM — H43.393 OTHER VITREOUS OPACITIES, BILATERAL: Status: RESOLVED | Noted: 2018-11-08 | Resolved: 2024-12-11

## 2024-12-11 PROBLEM — M25.511 ACUTE PAIN OF RIGHT SHOULDER: Status: RESOLVED | Noted: 2021-03-22 | Resolved: 2024-12-11

## 2024-12-11 PROBLEM — L08.9 INFECTED DOG BITE: Status: RESOLVED | Noted: 2024-06-05 | Resolved: 2024-12-11

## 2024-12-11 PROBLEM — K64.9 HEMORRHOIDS: Status: RESOLVED | Noted: 2023-01-09 | Resolved: 2024-12-11

## 2024-12-11 PROBLEM — F41.9 ANXIETY: Status: RESOLVED | Noted: 2019-12-10 | Resolved: 2024-12-11

## 2024-12-11 PROCEDURE — 3288F FALL RISK ASSESSMENT DOCD: CPT | Mod: CPTII,S$GLB,, | Performed by: STUDENT IN AN ORGANIZED HEALTH CARE EDUCATION/TRAINING PROGRAM

## 2024-12-11 PROCEDURE — 99999 PR PBB SHADOW E&M-EST. PATIENT-LVL III: CPT | Mod: PBBFAC,,, | Performed by: STUDENT IN AN ORGANIZED HEALTH CARE EDUCATION/TRAINING PROGRAM

## 2024-12-11 PROCEDURE — 3080F DIAST BP >= 90 MM HG: CPT | Mod: CPTII,S$GLB,, | Performed by: STUDENT IN AN ORGANIZED HEALTH CARE EDUCATION/TRAINING PROGRAM

## 2024-12-11 PROCEDURE — 1125F AMNT PAIN NOTED PAIN PRSNT: CPT | Mod: CPTII,S$GLB,, | Performed by: STUDENT IN AN ORGANIZED HEALTH CARE EDUCATION/TRAINING PROGRAM

## 2024-12-11 PROCEDURE — 99215 OFFICE O/P EST HI 40 MIN: CPT | Mod: S$GLB,,, | Performed by: STUDENT IN AN ORGANIZED HEALTH CARE EDUCATION/TRAINING PROGRAM

## 2024-12-11 PROCEDURE — 1101F PT FALLS ASSESS-DOCD LE1/YR: CPT | Mod: CPTII,S$GLB,, | Performed by: STUDENT IN AN ORGANIZED HEALTH CARE EDUCATION/TRAINING PROGRAM

## 2024-12-11 PROCEDURE — 1159F MED LIST DOCD IN RCRD: CPT | Mod: CPTII,S$GLB,, | Performed by: STUDENT IN AN ORGANIZED HEALTH CARE EDUCATION/TRAINING PROGRAM

## 2024-12-11 PROCEDURE — 3077F SYST BP >= 140 MM HG: CPT | Mod: CPTII,S$GLB,, | Performed by: STUDENT IN AN ORGANIZED HEALTH CARE EDUCATION/TRAINING PROGRAM

## 2024-12-11 PROCEDURE — G2211 COMPLEX E/M VISIT ADD ON: HCPCS | Mod: S$GLB,,, | Performed by: STUDENT IN AN ORGANIZED HEALTH CARE EDUCATION/TRAINING PROGRAM

## 2024-12-11 PROCEDURE — 3008F BODY MASS INDEX DOCD: CPT | Mod: CPTII,S$GLB,, | Performed by: STUDENT IN AN ORGANIZED HEALTH CARE EDUCATION/TRAINING PROGRAM

## 2024-12-11 PROCEDURE — 1160F RVW MEDS BY RX/DR IN RCRD: CPT | Mod: CPTII,S$GLB,, | Performed by: STUDENT IN AN ORGANIZED HEALTH CARE EDUCATION/TRAINING PROGRAM

## 2024-12-11 RX ORDER — AMLODIPINE BESYLATE 5 MG/1
5 TABLET ORAL DAILY
Qty: 90 TABLET | Refills: 0 | Status: SHIPPED | OUTPATIENT
Start: 2024-12-11 | End: 2025-03-11

## 2024-12-11 RX ORDER — ALBUTEROL SULFATE 90 UG/1
2 INHALANT RESPIRATORY (INHALATION) EVERY 6 HOURS PRN
Qty: 54 G | Refills: 0 | Status: SHIPPED | OUTPATIENT
Start: 2024-12-11

## 2024-12-11 RX ORDER — ATORVASTATIN CALCIUM 40 MG/1
40 TABLET, FILM COATED ORAL NIGHTLY
Qty: 90 TABLET | Refills: 0 | Status: SHIPPED | OUTPATIENT
Start: 2024-12-11 | End: 2025-03-11

## 2024-12-11 RX ORDER — HYDROGEN PEROXIDE 3 %
20 SOLUTION, NON-ORAL MISCELLANEOUS
Qty: 90 CAPSULE | Refills: 0 | Status: SHIPPED | OUTPATIENT
Start: 2024-12-11 | End: 2025-03-11

## 2024-12-11 RX ORDER — ESCITALOPRAM OXALATE 10 MG/1
10 TABLET ORAL DAILY
Qty: 90 TABLET | Refills: 0 | Status: SHIPPED | OUTPATIENT
Start: 2024-12-11 | End: 2025-03-11

## 2024-12-11 RX ORDER — NEOMYCIN SULFATE, POLYMYXIN B SULFATE, AND DEXAMETHASONE 3.5; 10000; 1 MG/G; [USP'U]/G; MG/G
OINTMENT OPHTHALMIC
COMMUNITY
Start: 2024-12-09

## 2024-12-11 NOTE — PROGRESS NOTES
Plan:     Azam was seen today for establish care.    Diagnoses and all orders for this visit:    Generalized anxiety disorder with panic attacks: Unable to prescribed Valium (or other benzo) in this setting given Hx. Offered alternative options including increasing Lexapro to 20 mg daily to better control his baseline anxiety and/or trying other PRN medications like hydroxyzine, propranolol, or Buspar. Pt declines at this time, stating that he plans to return to Dr. Fine to discuss restarting the Valium.   -     TSH; Future  -     Ambulatory referral/consult to Psychiatry; Future  -     EScitalopram oxalate (LEXAPRO) 10 MG tablet; Take 1 tablet (10 mg total) by mouth once daily.    Depression, unspecified depression type  -     TSH; Future  -     Ambulatory referral/consult to Psychiatry; Future  -     EScitalopram oxalate (LEXAPRO) 10 MG tablet; Take 1 tablet (10 mg total) by mouth once daily.    Primary hypertension  -     Comprehensive Metabolic Panel; Future  -     amLODIPine (NORVASC) 5 MG tablet; Take 1 tablet (5 mg total) by mouth once daily.    Dyslipidemia  -     Lipid Panel; Future  -     Comprehensive Metabolic Panel; Future  -     atorvastatin (LIPITOR) 40 MG tablet; Take 1 tablet (40 mg total) by mouth every evening.    Chronic obstructive pulmonary disease, unspecified COPD type  -     CBC Auto Differential; Future  -     albuterol (PROAIR HFA) 90 mcg/actuation inhaler; Inhale 2 puffs into the lungs every 6 (six) hours as needed for Wheezing or Shortness of Breath. Rescue    Ulcerative colitis without complications, unspecified location  -     CBC Auto Differential; Future  -     Ambulatory referral/consult to Gastroenterology; Future    Gastroesophageal reflux disease, unspecified whether esophagitis present  -     CBC Auto Differential; Future  -     Ambulatory referral/consult to Gastroenterology; Future  -     esomeprazole (NEXIUM) 20 MG capsule; Take 1 capsule (20 mg total) by mouth before  breakfast.    Pt declines flu shot at this time.     Follow up if symptoms worsen or fail to improve.    Jennifer Blanchard MD  12/11/2024    Subjective:      Patient ID: Azam Bowden Jr. is a 64 y.o. male    Chief Complaint   Patient presents with    [facility] care     HPI  64 y.o. male with a PMHx as documented below presents to clinic today for the following:    New patient to [facility]. Most recently following w/ Dr. Fine - pt reports Dr. Lantigua was his PCP prior.     Concerns today limited to obtaining Rx for Valium, which he states Dr. Lantigua previously prescribed. On chart review, it appears this topic was discussed in the past with psychiatry, and Valium (or any other benzo) was determined to be unsafe specifically due to patient's alcohol use.   ____________________________________________________    Most recently prescribed Lipitor 40 mg qhs, Lexapro 10 mg daily, and albuterol PRN by Dr. iFne in July 2024 (90 day supply filled).     Previous medications include Nexium 20 mg daily and amlodipine 5 mg daily.     Anxiety/depression:   - Lexapro 10 mg daily   - Previously following w/ Psych  - Previous Rx: Zoloft, Klonopin, Xanax, Valium (?)    (Hx?) of HTN:   - Not currently on any pharmacologic intervention  - Previous Rx: amlodipine 5 mg daily     HLD:   - Lipitor 40 mg daily     COPD:   - Albuterol PRN    Ulcerative colitis:   - Not currently on any pharmacologic intervention  - Not currently following w/ GI    GERD:   - Previous Rx: Nexium 20 mg daily     ROS  Constitutional:  Negative for chills and fever.   Respiratory:  Negative for shortness of breath.    Cardiovascular:  Negative for chest pain.   Gastrointestinal:  Negative for abdominal pain, constipation, diarrhea, nausea and vomiting.     Current Outpatient Medications   Medication Instructions    acetaminophen (TYLENOL) 1,000 mg, Every 8 hours PRN    albuterol (PROAIR HFA) 90 mcg/actuation inhaler 2 puffs, Inhalation,  Every 6 hours PRN, Rescue    amLODIPine (NORVASC) 5 mg, Oral, Daily    atorvastatin (LIPITOR) 40 mg, Oral, Nightly    EScitalopram oxalate (LEXAPRO) 10 mg, Oral, Daily    esomeprazole (NEXIUM) 20 mg, Oral, Before breakfast    latanoprost 0.005 % ophthalmic solution 1 drop, Both Eyes, Daily    neomycin-polymyxin-dexamethasone (DEXACINE) 3.5 mg/g-10,000 unit/g-0.1 % Oint       Past Medical History:   Diagnosis Date    Anxiety     COPD (chronic obstructive pulmonary disease) 08/13/2021    Dyslipidemia 08/03/2021    Generalized anxiety disorder with panic attacks 12/10/2019    likely exacerbated by alcohol use vs SIMD    GERD (gastroesophageal reflux disease)     Glaucoma     Hemorrhoids 01/09/2023    History of alcohol abuse     Infected dog bite 06/05/2024    Polyp of colon 01/09/2023    Primary hypertension 12/11/2024    Ulcerative colitis      Past Surgical History:   Procedure Laterality Date    anal fistula      CATARACT EXTRACTION W/  INTRAOCULAR LENS IMPLANT Bilateral n/a    done outside [facility]    CATARACT EXTRACTION, BILATERAL      COLONOSCOPY  2016    ulcerative colitis    COLONOSCOPY N/A 1/28/2022    Procedure: COLONOSCOPY;  Surgeon: Philip Collins MD;  Location: [facility] (4TH FLR);  Service: Endoscopy;  Laterality: N/A;  First provider available for hematochezia and red blood mixed in stools heme-positive stools  rapid 630am-inst handed-tb    TONSILLECTOMY      VITRECTOMY BY PARS PLANA APPROACH Left 11/28/2018    Procedure: VITRECTOMY, PARS PLANA APPROACH;  Surgeon: DIANDRA Rushing MD;  Location: [facility] OR [address] [place] [address];  Service: Ophthalmology;  Laterality: Left;  20 min    VITRECTOMY BY PARS PLANA APPROACH Right 12/12/2018    Procedure: VITRECTOMY, PARS PLANA APPROACH;  Surgeon: DIANDRA Rushing MD;  Location: [facility] OR [address] [place] [address];  Service: Ophthalmology;  Laterality: Right;  20min     Review of patient's allergies indicates:  No Known Allergies    Family History   Problem Relation Name Age of Onset     Cataracts Mother      Glaucoma Mother      Macular degeneration Mother      Diabetes Father      Cataracts Father      Glaucoma Father      Colon cancer Father  70    Cancer Sister      Prostate cancer Brother      Colon cancer Paternal Grandfather  60    Crohn's disease Paternal Uncle      Inflammatory bowel disease Paternal Uncle      Amblyopia Neg Hx      Blindness Neg Hx      Hypertension Neg Hx      Retinal detachment Neg Hx      Strabismus Neg Hx      Celiac disease Neg Hx      Cirrhosis Neg Hx      Colon polyps Neg Hx      Esophageal cancer Neg Hx      Liver cancer Neg Hx      Liver disease Neg Hx      Rectal cancer Neg Hx      Stomach cancer Neg Hx      Ulcerative colitis Neg Hx      Pancreatitis Neg Hx      Pancreatic cancer Neg Hx      Kidney cancer Neg Hx      Bladder Cancer Neg Hx      Uterine cancer Neg Hx      Ovarian cancer Neg Hx       Social History     Tobacco Use    Smoking status: Former     Current packs/day: 0.00     Average packs/day: 1 pack/day for 20.0 years (20.0 ttl pk-yrs)     Types: Cigarettes     Start date: 2/10/1989     Quit date: 2/10/2009     Years since quitting: 15.8    Smokeless tobacco: Former   Substance Use Topics    Alcohol use: Not Currently     Alcohol/week: 8.0 standard drinks of alcohol     Types: 8 Cans of beer per week     Comment: last drink 1/3/22    Drug use: Yes     Types: Marijuana     Comment: Cannabis occasionally     Currently on File with [facility]:   Most Recent Immunizations   Administered Date(s) Administered    COVID-19 MRNA, LN-S PF (MODERNA HALF 0.25 ML DOSE) 01/11/2022    COVID-19, vector-nr, rS-Ad26, PF (Adrián) 03/05/2021    Hepatitis B 02/03/1985    Influenza - Quadrivalent - PF *Preferred* (6 months and older) 12/01/2022    MMR 01/23/1990    Meningococcal Conjugate (MCV4P) 07/20/2002    PPD Test 01/11/2010    Pneumococcal Conjugate - 20 Valent 07/11/2023    Td (ADULT) 07/20/2002    Td (Adult), Unspecified Formulation 07/20/2002    Tdap  "09/08/2024    Zoster Recombinant 12/26/2019     Objective:      Vitals:    12/11/24 0920   BP: (!) 150/96   Pulse: (!) 58   SpO2: 99%   Weight: 75 kg (165 lb 7.3 oz)   Height: 5' 6" (1.676 m)     Body mass index is 26.71 kg/m².    Physical Exam   Constitutional:       General: No acute distress.  HENT:      Head: Normocephalic and atraumatic.   Pulmonary:      Effort: Pulmonary effort is normal. No respiratory distress.   Neurological:      General: No focal deficit present.      Mental Status: Alert and oriented to person, place, and time. Mental status is at baseline.    Assessment:       1. Generalized anxiety disorder with panic attacks    2. Depression, unspecified depression type    3. Primary hypertension    4. Dyslipidemia    5. Chronic obstructive pulmonary disease, unspecified COPD type    6. Ulcerative colitis without complications, unspecified location    7. Gastroesophageal reflux disease, unspecified whether esophagitis present        Jennifer Blanchard MD  [facility]  Office: [phone]   Fax: [phone]  12/11/2024      Disclaimer: This note was partly generated using dictation software which may occasionally result in transcription errors.    Total time spend on encounter: 40-54 minutes. This includes face to face time and non-face to face time preparing to see the patient (eg, review of tests), obtaining and/or reviewing separately obtained history, documenting clinical information in the electronic or other health record, independently interpreting results and communicating results to the patient/family/caregiver, or care coordinator.      Visit today included increased complexity associated with the care of the episodic problem (see above) addressed and managing the longitudinal care of the patient due to the serious and/or complex managed problem(s) (see above).   "

## 2024-12-31 ENCOUNTER — TELEPHONE (OUTPATIENT)
Dept: INTERNAL MEDICINE | Facility: CLINIC | Age: 64
End: 2024-12-31
Payer: MEDICARE

## 2024-12-31 NOTE — TELEPHONE ENCOUNTER
----- Message from Kelsey sent at 12/31/2024 11:38 AM CST -----  Contact: [phone] .[email or web address]     Patient is calling for Medical Advice regarding:pt is trying to cancel and reschedule his appt for today 12/31/24 for 3pm but nothing is coming up.  Please call pt back and reschedule his appt and he would like to come Thursday 1/2/ or Friday i1/3/25  am .  Please call pt back and advise.    How long has patient had these symptoms:    Pharmacy name and phone#:    Patient wants a call back or thru [facility]:callback /pp    Comments:    Please advise patient replies from provider may take up to 48 hours.

## 2024-12-31 NOTE — TELEPHONE ENCOUNTER
I called patient to get clarity to see if he wanted to cancel the appointment patient didn't answer I left a detailed message.

## 2025-01-29 ENCOUNTER — TELEPHONE (OUTPATIENT)
Dept: PSYCHIATRY | Facility: CLINIC | Age: 65
End: 2025-01-29
Payer: MEDICARE

## 2025-02-03 ENCOUNTER — TELEPHONE (OUTPATIENT)
Dept: PSYCHIATRY | Facility: CLINIC | Age: 65
End: 2025-02-03
Payer: MEDICARE

## 2025-03-24 DIAGNOSIS — Z00.00 ENCOUNTER FOR MEDICARE ANNUAL WELLNESS EXAM: ICD-10-CM

## 2025-04-03 ENCOUNTER — PATIENT MESSAGE (OUTPATIENT)
Dept: INTERNAL MEDICINE | Facility: CLINIC | Age: 65
End: 2025-04-03
Payer: MEDICARE

## 2025-06-17 ENCOUNTER — PATIENT MESSAGE (OUTPATIENT)
Dept: ADMINISTRATIVE | Facility: HOSPITAL | Age: 65
End: 2025-06-17
Payer: MEDICARE

## 2025-06-30 ENCOUNTER — PATIENT MESSAGE (OUTPATIENT)
Dept: ADMINISTRATIVE | Facility: HOSPITAL | Age: 65
End: 2025-06-30
Payer: MEDICARE

## (undated) DEVICE — CLOSURE SKIN STERI STRIP 1/2X4

## (undated) DEVICE — SEE MEDLINE ITEM 146372

## (undated) DEVICE — CONTAINER SPECIMEN STRL 4OZ

## (undated) DEVICE — SOL GONAK

## (undated) DEVICE — PACK TOTAL PLUS 25G VITRECTOMY

## (undated) DEVICE — FORCEP GRASPING 25GA SMOOTH

## (undated) DEVICE — Device

## (undated) DEVICE — LENS VITRCTMY OPHTH 30DEG 59DE

## (undated) DEVICE — SYR DISP LL 5CC

## (undated) DEVICE — SHIELD EYE METAL FOX 50/BX

## (undated) DEVICE — SOL WATER STRL IRR 1000ML

## (undated) DEVICE — BACKFLUSH 25GA SOFT-TIP DISP

## (undated) DEVICE — CORD FOR BIPOLAR FORCEPS 12

## (undated) DEVICE — SOL BALANCED SALT 500ML

## (undated) DEVICE — SYR 1CC TB SG 27GX1/2

## (undated) DEVICE — GOWN SURGICAL X-LARGE

## (undated) DEVICE — FORCEP GRIESHABER MAXGRIP 25G

## (undated) DEVICE — SOL BSS BALANCED SALT

## (undated) DEVICE — NDL 22GA X1 1/2 REG BEVEL

## (undated) DEVICE — SYR 30CC LUER LOCK

## (undated) DEVICE — SOL BETADINE 5%

## (undated) DEVICE — COVER MAYO STAND REINFRCD 30

## (undated) DEVICE — KNIFE OPHTH MICRO UNITOME 5MM

## (undated) DEVICE — TRAY MUSCLE LID EYE

## (undated) DEVICE — SYR 10CC LUER LOCK

## (undated) DEVICE — KIT PERFLUOROCARBON LIQUID

## (undated) DEVICE — SEE MEDLINE ITEM 157131

## (undated) DEVICE — KIT GREY EYE

## (undated) DEVICE — PROBE ILLUM FLEX CURVE LASER

## (undated) DEVICE — SUT 7/0 18IN COATED VICRYL